# Patient Record
Sex: FEMALE | Race: WHITE | NOT HISPANIC OR LATINO | Employment: OTHER | URBAN - METROPOLITAN AREA
[De-identification: names, ages, dates, MRNs, and addresses within clinical notes are randomized per-mention and may not be internally consistent; named-entity substitution may affect disease eponyms.]

---

## 2017-01-10 ENCOUNTER — HISTORICAL (OUTPATIENT)
Dept: RADIOLOGY | Facility: HOSPITAL | Age: 82
End: 2017-01-10

## 2017-05-12 ENCOUNTER — HISTORICAL (OUTPATIENT)
Dept: RADIOLOGY | Facility: HOSPITAL | Age: 82
End: 2017-05-12

## 2017-07-10 ENCOUNTER — HISTORICAL (OUTPATIENT)
Dept: LAB | Facility: HOSPITAL | Age: 82
End: 2017-07-10

## 2017-07-10 LAB
ALBUMIN SERPL-MCNC: 3.9 GM/DL (ref 3.4–5)
ALBUMIN/GLOB SERPL: 1.3 RATIO (ref 1.1–2)
ALP SERPL-CCNC: 83 UNIT/L (ref 46–116)
ALT SERPL-CCNC: 37 UNIT/L (ref 12–78)
AST SERPL-CCNC: 27 UNIT/L (ref 15–37)
BILIRUB SERPL-MCNC: 0.9 MG/DL (ref 0.2–1)
BILIRUBIN DIRECT+TOT PNL SERPL-MCNC: 0.15 MG/DL (ref 0–0.2)
BILIRUBIN DIRECT+TOT PNL SERPL-MCNC: 0.75 MG/DL (ref 0–0.8)
BUN SERPL-MCNC: 13 MG/DL (ref 7–18)
CALCIUM SERPL-MCNC: 9 MG/DL (ref 8.5–10.1)
CHLORIDE SERPL-SCNC: 102 MMOL/L (ref 98–107)
CK MB SERPL-MCNC: 1.4 NG/ML
CK SERPL-CCNC: 93 UNIT/L (ref 21–232)
CO2 SERPL-SCNC: 31.3 MMOL/L (ref 21–32)
CREAT SERPL-MCNC: 1.13 MG/DL (ref 0.6–1.3)
GLOBULIN SER-MCNC: 3.1 GM/DL (ref 2.4–3.5)
GLUCOSE SERPL-MCNC: 131 MG/DL (ref 74–106)
POTASSIUM SERPL-SCNC: 3.7 MMOL/L (ref 3.5–5.1)
PROT SERPL-MCNC: 7 GM/DL (ref 6.4–8.2)
SODIUM SERPL-SCNC: 141 MMOL/L (ref 136–145)
TROPONIN I SERPL-MCNC: <0.02 NG/ML (ref 0.02–0.06)

## 2017-09-07 ENCOUNTER — HISTORICAL (OUTPATIENT)
Dept: LAB | Facility: HOSPITAL | Age: 82
End: 2017-09-07

## 2017-09-07 LAB
APTT PPP: 22.4 SECOND(S) (ref 24.5–34.9)
CHOLEST SERPL-MCNC: 285 MG/DL (ref 0–200)
CHOLEST/HDLC SERPL: 6.2 {RATIO} (ref 0–4)
ERYTHROCYTE [DISTWIDTH] IN BLOOD BY AUTOMATED COUNT: 13.6 % (ref 11.5–17)
HCT VFR BLD AUTO: 46.9 % (ref 37–47)
HDLC SERPL-MCNC: 46 MG/DL (ref 40–60)
HGB BLD-MCNC: 15.8 GM/DL (ref 12–16)
INR PPP: 0.99 (ref 2–3)
LDLC SERPL CALC-MCNC: 158 MG/DL (ref 0–129)
MCH RBC QN AUTO: 30.4 PG (ref 27–31)
MCHC RBC AUTO-ENTMCNC: 33.7 GM/DL (ref 33–36)
MCV RBC AUTO: 90.1 FL (ref 80–94)
PLATELET # BLD AUTO: 192 X10(3)/MCL (ref 130–400)
PMV BLD AUTO: 8.3 FL (ref 7.4–10.4)
PROTHROMBIN TIME: 10.1 SECOND(S) (ref 9.3–11.4)
RBC # BLD AUTO: 5.21 X10(6)/MCL (ref 4.2–5.4)
TRIGL SERPL-MCNC: 404 MG/DL
TSH SERPL-ACNC: 0.98 MIU/ML (ref 0.36–3.74)
VLDLC SERPL CALC-MCNC: 81 MG/DL
WBC # SPEC AUTO: 8.5 X10(3)/MCL (ref 4.5–11.5)

## 2018-02-19 ENCOUNTER — HISTORICAL (OUTPATIENT)
Dept: RADIOLOGY | Facility: HOSPITAL | Age: 83
End: 2018-02-19

## 2018-08-30 ENCOUNTER — HISTORICAL (OUTPATIENT)
Dept: LAB | Facility: HOSPITAL | Age: 83
End: 2018-08-30

## 2018-08-30 LAB
ALBUMIN SERPL-MCNC: 3.8 GM/DL (ref 3.4–5)
ALBUMIN/GLOB SERPL: 1.2 RATIO (ref 1.1–2)
ALP SERPL-CCNC: 84 UNIT/L (ref 46–116)
ALT SERPL-CCNC: 35 UNIT/L (ref 12–78)
APTT PPP: 22.5 SECOND(S) (ref 24.5–34.9)
AST SERPL-CCNC: 23 UNIT/L (ref 15–37)
BILIRUB SERPL-MCNC: 0.9 MG/DL (ref 0.2–1)
BILIRUBIN DIRECT+TOT PNL SERPL-MCNC: 0.13 MG/DL (ref 0–0.2)
BILIRUBIN DIRECT+TOT PNL SERPL-MCNC: 0.77 MG/DL (ref 0–0.8)
BUN SERPL-MCNC: 15.1 MG/DL (ref 7–18)
CALCIUM SERPL-MCNC: 9.3 MG/DL (ref 8.5–10.1)
CHLORIDE SERPL-SCNC: 100 MMOL/L (ref 98–107)
CO2 SERPL-SCNC: 32.1 MMOL/L (ref 21–32)
CREAT SERPL-MCNC: 0.99 MG/DL (ref 0.6–1.3)
ERYTHROCYTE [DISTWIDTH] IN BLOOD BY AUTOMATED COUNT: 13 % (ref 11.5–17)
GLOBULIN SER-MCNC: 3.1 GM/DL (ref 2.4–3.5)
GLUCOSE SERPL-MCNC: 111 MG/DL (ref 74–106)
HCT VFR BLD AUTO: 46.6 % (ref 37–47)
HGB BLD-MCNC: 15.5 GM/DL (ref 12–16)
INR PPP: 0.89 (ref 2–3)
MCH RBC QN AUTO: 30 PG (ref 27–31)
MCHC RBC AUTO-ENTMCNC: 33.3 GM/DL (ref 33–36)
MCV RBC AUTO: 90.3 FL (ref 80–94)
PLATELET # BLD AUTO: 230 X10(3)/MCL (ref 130–400)
PMV BLD AUTO: 10.2 FL (ref 9.4–12.4)
POTASSIUM SERPL-SCNC: 3.7 MMOL/L (ref 3.5–5.1)
PROT SERPL-MCNC: 6.9 GM/DL (ref 6.4–8.2)
PROTHROMBIN TIME: 9.5 SECOND(S) (ref 9.3–11.4)
RBC # BLD AUTO: 5.16 X10(6)/MCL (ref 4.2–5.4)
SODIUM SERPL-SCNC: 140 MMOL/L (ref 136–145)
WBC # SPEC AUTO: 9.4 X10(3)/MCL (ref 4.5–11.5)

## 2018-12-18 ENCOUNTER — HISTORICAL (OUTPATIENT)
Dept: LAB | Facility: HOSPITAL | Age: 83
End: 2018-12-18

## 2018-12-18 LAB
ALBUMIN SERPL-MCNC: 3.7 GM/DL (ref 3.4–5)
ALBUMIN/GLOB SERPL: 1.2 RATIO (ref 1.1–2)
ALP SERPL-CCNC: 76 UNIT/L (ref 46–116)
ALT SERPL-CCNC: 29 UNIT/L (ref 12–78)
AST SERPL-CCNC: 18 UNIT/L (ref 15–37)
BILIRUB SERPL-MCNC: 1 MG/DL (ref 0.2–1)
BILIRUBIN DIRECT+TOT PNL SERPL-MCNC: 0.17 MG/DL (ref 0–0.2)
BILIRUBIN DIRECT+TOT PNL SERPL-MCNC: 0.83 MG/DL (ref 0–0.8)
BUN SERPL-MCNC: 11.9 MG/DL (ref 7–18)
CALCIUM SERPL-MCNC: 9.4 MG/DL (ref 8.5–10.1)
CHLORIDE SERPL-SCNC: 102 MMOL/L (ref 98–107)
CHOLEST SERPL-MCNC: 186 MG/DL (ref 0–200)
CHOLEST/HDLC SERPL: 4.8 {RATIO} (ref 0–4)
CO2 SERPL-SCNC: 30.8 MMOL/L (ref 21–32)
CREAT SERPL-MCNC: 0.69 MG/DL (ref 0.6–1.3)
GLOBULIN SER-MCNC: 3.1 GM/DL (ref 2.4–3.5)
GLUCOSE SERPL-MCNC: 128 MG/DL (ref 74–106)
HDLC SERPL-MCNC: 39 MG/DL (ref 40–60)
LDLC SERPL CALC-MCNC: 105 MG/DL (ref 0–129)
POTASSIUM SERPL-SCNC: 4.2 MMOL/L (ref 3.5–5.1)
PROT SERPL-MCNC: 6.8 GM/DL (ref 6.4–8.2)
SODIUM SERPL-SCNC: 141 MMOL/L (ref 136–145)
TRIGL SERPL-MCNC: 209 MG/DL
VLDLC SERPL CALC-MCNC: 42 MG/DL

## 2019-05-22 ENCOUNTER — HISTORICAL (OUTPATIENT)
Dept: LAB | Facility: HOSPITAL | Age: 84
End: 2019-05-22

## 2019-05-22 LAB
ALBUMIN SERPL-MCNC: 3.8 GM/DL (ref 3.4–5)
ALBUMIN/GLOB SERPL: 1.2 RATIO (ref 1.1–2)
ALP SERPL-CCNC: 84 UNIT/L (ref 46–116)
ALT SERPL-CCNC: 29 UNIT/L (ref 12–78)
AST SERPL-CCNC: 21 UNIT/L (ref 15–37)
BILIRUB SERPL-MCNC: 1.2 MG/DL (ref 0.2–1)
BILIRUBIN DIRECT+TOT PNL SERPL-MCNC: 0.2 MG/DL (ref 0–0.2)
BILIRUBIN DIRECT+TOT PNL SERPL-MCNC: 1 MG/DL (ref 0–0.8)
BUN SERPL-MCNC: 10.9 MG/DL (ref 7–18)
CALCIUM SERPL-MCNC: 9.3 MG/DL (ref 8.5–10.1)
CHLORIDE SERPL-SCNC: 100 MMOL/L (ref 98–107)
CHOLEST SERPL-MCNC: 253 MG/DL (ref 0–200)
CHOLEST/HDLC SERPL: 6.5 {RATIO} (ref 0–4)
CO2 SERPL-SCNC: 29.7 MMOL/L (ref 21–32)
CREAT SERPL-MCNC: 0.81 MG/DL (ref 0.6–1.3)
ERYTHROCYTE [DISTWIDTH] IN BLOOD BY AUTOMATED COUNT: 13.2 % (ref 11.5–17)
GLOBULIN SER-MCNC: 3.2 GM/DL (ref 2.4–3.5)
GLUCOSE SERPL-MCNC: 120 MG/DL (ref 74–106)
HCT VFR BLD AUTO: 46 % (ref 37–47)
HDLC SERPL-MCNC: 39 MG/DL (ref 40–60)
HGB BLD-MCNC: 15.8 GM/DL (ref 12–16)
LDLC SERPL CALC-MCNC: 154 MG/DL (ref 0–129)
MCH RBC QN AUTO: 30.4 PG (ref 27–31)
MCHC RBC AUTO-ENTMCNC: 34.3 GM/DL (ref 33–36)
MCV RBC AUTO: 88.5 FL (ref 80–94)
PLATELET # BLD AUTO: 226 X10(3)/MCL (ref 130–400)
PMV BLD AUTO: 10.4 FL (ref 9.4–12.4)
POTASSIUM SERPL-SCNC: 4.1 MMOL/L (ref 3.5–5.1)
PROT SERPL-MCNC: 7 GM/DL (ref 6.4–8.2)
RBC # BLD AUTO: 5.2 X10(6)/MCL (ref 4.2–5.4)
SODIUM SERPL-SCNC: 139 MMOL/L (ref 136–145)
TRIGL SERPL-MCNC: 300 MG/DL
TSH SERPL-ACNC: 1.68 MIU/ML (ref 0.36–3.74)
VLDLC SERPL CALC-MCNC: 60 MG/DL
WBC # SPEC AUTO: 10.5 X10(3)/MCL (ref 4.5–11.5)

## 2019-05-28 ENCOUNTER — HISTORICAL (OUTPATIENT)
Dept: RADIOLOGY | Facility: HOSPITAL | Age: 84
End: 2019-05-28

## 2020-01-23 ENCOUNTER — HISTORICAL (OUTPATIENT)
Dept: RADIOLOGY | Facility: HOSPITAL | Age: 85
End: 2020-01-23

## 2020-01-31 ENCOUNTER — HISTORICAL (OUTPATIENT)
Dept: LAB | Facility: HOSPITAL | Age: 85
End: 2020-01-31

## 2020-01-31 LAB
ALBUMIN SERPL-MCNC: 3.6 GM/DL (ref 3.4–5)
ALBUMIN/GLOB SERPL: 1.2 RATIO (ref 1.1–2)
ALP SERPL-CCNC: 92 UNIT/L (ref 46–116)
ALT SERPL-CCNC: 26 UNIT/L (ref 12–78)
AST SERPL-CCNC: 21 UNIT/L (ref 15–37)
BILIRUB SERPL-MCNC: 1 MG/DL (ref 0.2–1)
BILIRUBIN DIRECT+TOT PNL SERPL-MCNC: 0.18 MG/DL (ref 0–0.2)
BILIRUBIN DIRECT+TOT PNL SERPL-MCNC: 0.82 MG/DL (ref 0–0.8)
BUN SERPL-MCNC: 13 MG/DL (ref 7–18)
CALCIUM SERPL-MCNC: 9.2 MG/DL (ref 8.5–10.1)
CHLORIDE SERPL-SCNC: 97 MMOL/L (ref 98–107)
CHOLEST SERPL-MCNC: 219 MG/DL (ref 0–200)
CHOLEST/HDLC SERPL: 5.2 {RATIO} (ref 0–4)
CO2 SERPL-SCNC: 32 MMOL/L (ref 21–32)
CREAT SERPL-MCNC: 0.85 MG/DL (ref 0.6–1.3)
GLOBULIN SER-MCNC: 3 GM/DL (ref 2.4–3.5)
GLUCOSE SERPL-MCNC: 122 MG/DL (ref 74–106)
HDLC SERPL-MCNC: 42 MG/DL (ref 40–60)
LDLC SERPL CALC-MCNC: 131 MG/DL (ref 0–129)
POTASSIUM SERPL-SCNC: 5 MMOL/L (ref 3.5–5.1)
PROT SERPL-MCNC: 6.6 GM/DL (ref 6.4–8.2)
SODIUM SERPL-SCNC: 136 MMOL/L (ref 136–145)
TRIGL SERPL-MCNC: 228 MG/DL
VLDLC SERPL CALC-MCNC: 46 MG/DL

## 2021-11-18 ENCOUNTER — HISTORICAL (OUTPATIENT)
Dept: RADIOLOGY | Facility: HOSPITAL | Age: 86
End: 2021-11-18

## 2022-06-20 DIAGNOSIS — R22.1 MASS OF NECK: Primary | ICD-10-CM

## 2022-06-28 ENCOUNTER — HOSPITAL ENCOUNTER (OUTPATIENT)
Dept: RADIOLOGY | Facility: HOSPITAL | Age: 87
Discharge: HOME | End: 2022-06-28
Attending: FAMILY MEDICINE
Payer: MEDICARE

## 2022-06-28 DIAGNOSIS — R22.1 MASS OF NECK: ICD-10-CM

## 2022-06-28 PROCEDURE — 76536 US EXAM OF HEAD AND NECK: CPT | Mod: TC

## 2022-10-17 ENCOUNTER — LAB VISIT (OUTPATIENT)
Dept: LAB | Facility: HOSPITAL | Age: 87
End: 2022-10-17
Attending: FAMILY MEDICINE
Payer: MEDICARE

## 2022-10-17 DIAGNOSIS — M12.9 ACUTE ARTHROPATHY: ICD-10-CM

## 2022-10-17 DIAGNOSIS — E78.5 HYPERLIPIDEMIA, UNSPECIFIED HYPERLIPIDEMIA TYPE: ICD-10-CM

## 2022-10-17 DIAGNOSIS — F45.8 ANXIETY HYPERVENTILATION: Primary | ICD-10-CM

## 2022-10-17 DIAGNOSIS — E03.9 MYXEDEMA HEART DISEASE: ICD-10-CM

## 2022-10-17 DIAGNOSIS — M25.50 PAIN IN JOINT, MULTIPLE SITES: ICD-10-CM

## 2022-10-17 DIAGNOSIS — I10 HYPERTENSION, UNSPECIFIED TYPE: ICD-10-CM

## 2022-10-17 DIAGNOSIS — F41.9 ANXIETY HYPERVENTILATION: Primary | ICD-10-CM

## 2022-10-17 DIAGNOSIS — I51.9 MYXEDEMA HEART DISEASE: ICD-10-CM

## 2022-10-17 LAB
ALBUMIN SERPL-MCNC: 3.9 GM/DL (ref 3.4–4.8)
ALBUMIN/GLOB SERPL: 1.2 RATIO (ref 1.1–2)
ALP SERPL-CCNC: 84 UNIT/L (ref 40–150)
ALT SERPL-CCNC: 22 UNIT/L (ref 0–55)
AST SERPL-CCNC: 19 UNIT/L (ref 5–34)
BILIRUBIN DIRECT+TOT PNL SERPL-MCNC: 1.2 MG/DL
BUN SERPL-MCNC: 15.4 MG/DL (ref 9.8–20.1)
CALCIUM SERPL-MCNC: 9.7 MG/DL (ref 8.4–10.2)
CHLORIDE SERPL-SCNC: 98 MMOL/L (ref 98–107)
CHOLEST SERPL-MCNC: 233 MG/DL
CHOLEST/HDLC SERPL: 6 {RATIO} (ref 0–5)
CO2 SERPL-SCNC: 27 MMOL/L (ref 23–31)
CREAT SERPL-MCNC: 0.88 MG/DL (ref 0.55–1.02)
ERYTHROCYTE [DISTWIDTH] IN BLOOD BY AUTOMATED COUNT: 13.4 % (ref 11.5–17)
GFR SERPLBLD CREATININE-BSD FMLA CKD-EPI: >60 MLS/MIN/1.73/M2
GLOBULIN SER-MCNC: 3.2 GM/DL (ref 2.4–3.5)
GLUCOSE SERPL-MCNC: 157 MG/DL (ref 82–115)
HCT VFR BLD AUTO: 48.8 % (ref 37–47)
HDLC SERPL-MCNC: 42 MG/DL (ref 35–60)
HGB BLD-MCNC: 15.3 GM/DL (ref 12–16)
LDLC SERPL CALC-MCNC: 137 MG/DL (ref 50–140)
MCH RBC QN AUTO: 27.4 PG (ref 27–31)
MCHC RBC AUTO-ENTMCNC: 31.4 MG/DL (ref 33–36)
MCV RBC AUTO: 87.5 FL (ref 80–94)
PLATELET # BLD AUTO: 236 X10(3)/MCL (ref 130–400)
PMV BLD AUTO: 10.5 FL (ref 7.4–10.4)
POTASSIUM SERPL-SCNC: 3.8 MMOL/L (ref 3.5–5.1)
PROT SERPL-MCNC: 7.1 GM/DL (ref 5.8–7.6)
RBC # BLD AUTO: 5.58 X10(6)/MCL (ref 4.2–5.4)
SODIUM SERPL-SCNC: 139 MMOL/L (ref 136–145)
TRIGL SERPL-MCNC: 269 MG/DL (ref 37–140)
TSH SERPL-ACNC: 1.64 UIU/ML (ref 0.35–4.94)
VLDLC SERPL CALC-MCNC: 54 MG/DL
WBC # SPEC AUTO: 12 X10(3)/MCL (ref 4.5–11.5)

## 2022-10-17 PROCEDURE — 80061 LIPID PANEL: CPT

## 2022-10-17 PROCEDURE — 36415 COLL VENOUS BLD VENIPUNCTURE: CPT

## 2022-10-17 PROCEDURE — 84443 ASSAY THYROID STIM HORMONE: CPT

## 2022-10-17 PROCEDURE — 85027 COMPLETE CBC AUTOMATED: CPT

## 2022-10-17 PROCEDURE — 80053 COMPREHEN METABOLIC PANEL: CPT

## 2022-10-19 ENCOUNTER — APPOINTMENT (OUTPATIENT)
Dept: LAB | Facility: HOSPITAL | Age: 87
End: 2022-10-19
Attending: FAMILY MEDICINE
Payer: MEDICARE

## 2022-10-19 LAB
APPEARANCE UR: CLEAR
BACTERIA #/AREA URNS AUTO: NORMAL /HPF
BILIRUB UR QL STRIP.AUTO: ABNORMAL MG/DL
COLOR UR AUTO: YELLOW
GLUCOSE UR QL STRIP.AUTO: NEGATIVE MG/DL
KETONES UR QL STRIP.AUTO: 15 MG/DL
LEUKOCYTE ESTERASE UR QL STRIP.AUTO: NEGATIVE UNIT/L
NITRITE UR QL STRIP.AUTO: NEGATIVE
PH UR STRIP.AUTO: 5.5 [PH]
PROT UR QL STRIP.AUTO: 100 MG/DL
RBC #/AREA URNS AUTO: NORMAL /HPF
RBC UR QL AUTO: ABNORMAL UNIT/L
SP GR UR STRIP.AUTO: >=1.03
SQUAMOUS #/AREA URNS AUTO: NORMAL /HPF
UROBILINOGEN UR STRIP-ACNC: 1 MG/DL
WBC #/AREA URNS AUTO: NORMAL /HPF

## 2022-10-19 PROCEDURE — 81001 URINALYSIS AUTO W/SCOPE: CPT

## 2022-11-21 ENCOUNTER — HOSPITAL ENCOUNTER (EMERGENCY)
Facility: HOSPITAL | Age: 87
Discharge: HOME | End: 2022-11-22
Attending: EMERGENCY MEDICINE
Payer: MEDICARE

## 2022-11-21 DIAGNOSIS — R55 NEAR SYNCOPE: ICD-10-CM

## 2022-11-21 DIAGNOSIS — G93.89 MASS OF TEMPOROPARIETAL REGION OF BRAIN: Primary | ICD-10-CM

## 2022-11-21 PROBLEM — G25.81 RESTLESS LEGS SYNDROME: Status: ACTIVE | Noted: 2022-11-21

## 2022-11-21 PROBLEM — G43.909 MIGRAINE HEADACHE: Status: ACTIVE | Noted: 2022-11-21

## 2022-11-21 PROBLEM — F41.9 ANXIETY: Status: ACTIVE | Noted: 2022-11-21

## 2022-11-21 PROBLEM — K46.9 ABDOMINAL HERNIA: Status: ACTIVE | Noted: 2022-11-21

## 2022-11-21 PROBLEM — J18.9 PNEUMONIA: Status: ACTIVE | Noted: 2022-11-21

## 2022-11-21 PROBLEM — K57.92 DIVERTICULITIS: Status: ACTIVE | Noted: 2022-11-21

## 2022-11-21 PROBLEM — I51.9 HEART DISEASE: Status: ACTIVE | Noted: 2022-11-21

## 2022-11-21 PROBLEM — M54.9 CHRONIC BACK PAIN: Status: ACTIVE | Noted: 2022-11-21

## 2022-11-21 PROBLEM — H26.9 CATARACT OF BOTH EYES: Status: ACTIVE | Noted: 2022-11-21

## 2022-11-21 PROBLEM — F40.240 CLAUSTROPHOBIA: Status: ACTIVE | Noted: 2022-11-21

## 2022-11-21 PROBLEM — G89.29 CHRONIC BACK PAIN: Status: ACTIVE | Noted: 2022-11-21

## 2022-11-21 PROBLEM — M19.90 ARTHRITIS: Status: ACTIVE | Noted: 2022-11-21

## 2022-11-21 PROBLEM — I20.0 UNSTABLE ANGINA: Status: ACTIVE | Noted: 2020-02-27

## 2022-11-21 PROBLEM — J30.2 SEASONAL ALLERGIC RHINITIS: Status: ACTIVE | Noted: 2022-11-21

## 2022-11-21 PROBLEM — R68.89 IMPAIRED EXERCISE TOLERANCE: Status: ACTIVE | Noted: 2022-11-21

## 2022-11-21 PROBLEM — E07.9 DISORDER OF THYROID: Status: ACTIVE | Noted: 2022-11-21

## 2022-11-21 PROBLEM — R53.1 WEAKNESS: Status: ACTIVE | Noted: 2022-11-21

## 2022-11-21 PROBLEM — I49.9 IRREGULAR HEART RHYTHM: Status: ACTIVE | Noted: 2022-11-21

## 2022-11-21 PROBLEM — Z97.3 WEARS EYEGLASSES: Status: ACTIVE | Noted: 2022-11-21

## 2022-11-21 PROBLEM — E78.00 HYPERCHOLESTEROLEMIA: Status: ACTIVE | Noted: 2022-11-21

## 2022-11-21 PROBLEM — K21.9 GASTROESOPHAGEAL REFLUX DISEASE: Status: ACTIVE | Noted: 2022-11-21

## 2022-11-21 LAB
ALBUMIN SERPL-MCNC: 4.1 GM/DL (ref 3.4–4.8)
ALBUMIN/GLOB SERPL: 1.4 RATIO (ref 1.1–2)
ALP SERPL-CCNC: 82 UNIT/L (ref 40–150)
ALT SERPL-CCNC: 16 UNIT/L (ref 0–55)
APPEARANCE UR: CLEAR
AST SERPL-CCNC: 19 UNIT/L (ref 5–34)
BACTERIA #/AREA URNS AUTO: ABNORMAL /HPF
BASOPHILS # BLD AUTO: 0.06 X10(3)/MCL (ref 0–0.2)
BASOPHILS NFR BLD AUTO: 0.4 %
BILIRUB UR QL STRIP.AUTO: NEGATIVE MG/DL
BILIRUBIN DIRECT+TOT PNL SERPL-MCNC: 1.5 MG/DL
BUN SERPL-MCNC: 19.3 MG/DL (ref 9.8–20.1)
CALCIUM SERPL-MCNC: 9.8 MG/DL (ref 8.4–10.2)
CHLORIDE SERPL-SCNC: 97 MMOL/L (ref 98–107)
CO2 SERPL-SCNC: 27 MMOL/L (ref 23–31)
COLOR UR AUTO: YELLOW
CREAT SERPL-MCNC: 1 MG/DL (ref 0.55–1.02)
EOSINOPHIL # BLD AUTO: 0.17 X10(3)/MCL (ref 0–0.9)
EOSINOPHIL NFR BLD AUTO: 1.3 %
ERYTHROCYTE [DISTWIDTH] IN BLOOD BY AUTOMATED COUNT: 13.4 % (ref 11.5–17)
FLUAV AG UPPER RESP QL IA.RAPID: NOT DETECTED
FLUBV AG UPPER RESP QL IA.RAPID: NOT DETECTED
GFR SERPLBLD CREATININE-BSD FMLA CKD-EPI: 55 MLS/MIN/1.73/M2
GLOBULIN SER-MCNC: 3 GM/DL (ref 2.4–3.5)
GLUCOSE SERPL-MCNC: 187 MG/DL (ref 82–115)
GLUCOSE UR QL STRIP.AUTO: NEGATIVE MG/DL
HCT VFR BLD AUTO: 48.1 % (ref 37–47)
HGB BLD-MCNC: 15.6 GM/DL (ref 12–16)
IMM GRANULOCYTES # BLD AUTO: 0.1 X10(3)/MCL (ref 0–0.04)
IMM GRANULOCYTES NFR BLD AUTO: 0.7 %
KETONES UR QL STRIP.AUTO: NEGATIVE MG/DL
LEUKOCYTE ESTERASE UR QL STRIP.AUTO: ABNORMAL UNIT/L
LYMPHOCYTES # BLD AUTO: 2.73 X10(3)/MCL (ref 0.6–4.6)
LYMPHOCYTES NFR BLD AUTO: 20.3 %
MCH RBC QN AUTO: 27.9 PG (ref 27–31)
MCHC RBC AUTO-ENTMCNC: 32.4 MG/DL (ref 33–36)
MCV RBC AUTO: 86 FL (ref 80–94)
MONOCYTES # BLD AUTO: 1.12 X10(3)/MCL (ref 0.1–1.3)
MONOCYTES NFR BLD AUTO: 8.3 %
MUCOUS THREADS URNS QL MICRO: ABNORMAL /LPF
NEUTROPHILS # BLD AUTO: 9.3 X10(3)/MCL (ref 2.1–9.2)
NEUTROPHILS NFR BLD AUTO: 69 %
NITRITE UR QL STRIP.AUTO: NEGATIVE
PH UR STRIP.AUTO: 6 [PH]
PLATELET # BLD AUTO: 295 X10(3)/MCL (ref 130–400)
PMV BLD AUTO: 10.8 FL (ref 7.4–10.4)
POC CARDIAC TROPONIN I: 0 NG/ML (ref 0–0.08)
POCT GLUCOSE: 163 MG/DL (ref 70–110)
POTASSIUM SERPL-SCNC: 3.6 MMOL/L (ref 3.5–5.1)
PROT SERPL-MCNC: 7.1 GM/DL (ref 5.8–7.6)
PROT UR QL STRIP.AUTO: ABNORMAL MG/DL
RBC # BLD AUTO: 5.59 X10(6)/MCL (ref 4.2–5.4)
RBC #/AREA URNS AUTO: ABNORMAL /HPF
RBC UR QL AUTO: ABNORMAL UNIT/L
SAMPLE: NORMAL
SARS-COV-2 RNA RESP QL NAA+PROBE: NOT DETECTED
SODIUM SERPL-SCNC: 137 MMOL/L (ref 136–145)
SP GR UR STRIP.AUTO: >=1.03
SQUAMOUS #/AREA URNS AUTO: ABNORMAL /HPF
TSH SERPL-ACNC: 2.48 UIU/ML (ref 0.35–4.94)
UROBILINOGEN UR STRIP-ACNC: 0.2 MG/DL
WBC # SPEC AUTO: 13.4 X10(3)/MCL (ref 4.5–11.5)
WBC #/AREA URNS AUTO: ABNORMAL /HPF

## 2022-11-21 PROCEDURE — 82962 GLUCOSE BLOOD TEST: CPT

## 2022-11-21 PROCEDURE — 96365 THER/PROPH/DIAG IV INF INIT: CPT | Mod: 59

## 2022-11-21 PROCEDURE — 25000003 PHARM REV CODE 250: Performed by: SPECIALIST

## 2022-11-21 PROCEDURE — 84443 ASSAY THYROID STIM HORMONE: CPT | Performed by: EMERGENCY MEDICINE

## 2022-11-21 PROCEDURE — 81001 URINALYSIS AUTO W/SCOPE: CPT | Performed by: EMERGENCY MEDICINE

## 2022-11-21 PROCEDURE — 99285 EMERGENCY DEPT VISIT HI MDM: CPT | Mod: 25

## 2022-11-21 PROCEDURE — 63600175 PHARM REV CODE 636 W HCPCS: Performed by: EMERGENCY MEDICINE

## 2022-11-21 PROCEDURE — 80053 COMPREHEN METABOLIC PANEL: CPT | Performed by: EMERGENCY MEDICINE

## 2022-11-21 PROCEDURE — 81003 URINALYSIS AUTO W/O SCOPE: CPT | Performed by: EMERGENCY MEDICINE

## 2022-11-21 PROCEDURE — 0240U COVID/FLU A&B PCR: CPT | Performed by: EMERGENCY MEDICINE

## 2022-11-21 PROCEDURE — 84484 ASSAY OF TROPONIN QUANT: CPT

## 2022-11-21 PROCEDURE — 85025 COMPLETE CBC W/AUTO DIFF WBC: CPT | Performed by: EMERGENCY MEDICINE

## 2022-11-21 PROCEDURE — 93010 EKG 12-LEAD: ICD-10-PCS | Mod: ,,, | Performed by: STUDENT IN AN ORGANIZED HEALTH CARE EDUCATION/TRAINING PROGRAM

## 2022-11-21 PROCEDURE — 25500020 PHARM REV CODE 255: Performed by: EMERGENCY MEDICINE

## 2022-11-21 PROCEDURE — 93010 ELECTROCARDIOGRAM REPORT: CPT | Mod: ,,, | Performed by: STUDENT IN AN ORGANIZED HEALTH CARE EDUCATION/TRAINING PROGRAM

## 2022-11-21 PROCEDURE — 93005 ELECTROCARDIOGRAM TRACING: CPT

## 2022-11-21 PROCEDURE — 96375 TX/PRO/DX INJ NEW DRUG ADDON: CPT

## 2022-11-21 RX ORDER — LEVOTHYROXINE SODIUM 75 UG/1
1 TABLET ORAL DAILY
COMMUNITY
Start: 2022-10-17 | End: 2023-03-07 | Stop reason: SDUPTHER

## 2022-11-21 RX ORDER — HYDROCHLOROTHIAZIDE 25 MG/1
0.5 TABLET ORAL DAILY
Status: ON HOLD | COMMUNITY
Start: 2022-10-17 | End: 2022-11-30 | Stop reason: HOSPADM

## 2022-11-21 RX ORDER — CELECOXIB 200 MG/1
1 CAPSULE ORAL DAILY
Status: ON HOLD | COMMUNITY
Start: 2022-10-17 | End: 2022-11-30 | Stop reason: HOSPADM

## 2022-11-21 RX ORDER — CETIRIZINE HYDROCHLORIDE 10 MG/1
10 TABLET ORAL
Status: COMPLETED | OUTPATIENT
Start: 2022-11-21 | End: 2022-11-21

## 2022-11-21 RX ORDER — MINERAL OIL
1 ENEMA (ML) RECTAL DAILY
COMMUNITY
Start: 2022-10-17

## 2022-11-21 RX ORDER — FLUTICASONE PROPIONATE 50 MCG
1 SPRAY, SUSPENSION (ML) NASAL DAILY
COMMUNITY
Start: 2022-10-17

## 2022-11-21 RX ORDER — ATENOLOL 50 MG/1
1 TABLET ORAL DAILY
Status: ON HOLD | COMMUNITY
Start: 2022-10-17 | End: 2022-11-30 | Stop reason: HOSPADM

## 2022-11-21 RX ORDER — PANTOPRAZOLE SODIUM 40 MG/1
1 TABLET, DELAYED RELEASE ORAL DAILY
Status: ON HOLD | COMMUNITY
Start: 2022-10-17 | End: 2022-11-30 | Stop reason: SDUPTHER

## 2022-11-21 RX ORDER — LEVETIRACETAM 500 MG/5ML
500 INJECTION, SOLUTION, CONCENTRATE INTRAVENOUS
Status: DISCONTINUED | OUTPATIENT
Start: 2022-11-21 | End: 2022-11-21

## 2022-11-21 RX ORDER — DEXAMETHASONE SODIUM PHOSPHATE 4 MG/ML
8 INJECTION, SOLUTION INTRA-ARTICULAR; INTRALESIONAL; INTRAMUSCULAR; INTRAVENOUS; SOFT TISSUE
Status: COMPLETED | OUTPATIENT
Start: 2022-11-21 | End: 2022-11-21

## 2022-11-21 RX ORDER — LEVETIRACETAM 5 MG/ML
500 INJECTION INTRAVASCULAR ONCE
Status: COMPLETED | OUTPATIENT
Start: 2022-11-21 | End: 2022-11-21

## 2022-11-21 RX ORDER — ALPRAZOLAM 0.5 MG/1
0.5 TABLET ORAL DAILY PRN
Status: ON HOLD | COMMUNITY
Start: 2022-10-22 | End: 2022-11-30 | Stop reason: SDUPTHER

## 2022-11-21 RX ORDER — CITALOPRAM 20 MG/1
1 TABLET, FILM COATED ORAL DAILY
COMMUNITY
Start: 2022-08-06 | End: 2023-03-07 | Stop reason: SDUPTHER

## 2022-11-21 RX ADMIN — IOPAMIDOL 100 ML: 755 INJECTION, SOLUTION INTRAVENOUS at 02:11

## 2022-11-21 RX ADMIN — LEVETIRACETAM 500 MG: 5 INJECTION INTRAVENOUS at 04:11

## 2022-11-21 RX ADMIN — CETIRIZINE HYDROCHLORIDE 10 MG: 10 TABLET, FILM COATED ORAL at 11:11

## 2022-11-21 RX ADMIN — DEXAMETHASONE SODIUM PHOSPHATE 8 MG: 4 INJECTION, SOLUTION INTRA-ARTICULAR; INTRALESIONAL; INTRAMUSCULAR; INTRAVENOUS; SOFT TISSUE at 03:11

## 2022-11-21 NOTE — ED NOTES
Spoke with Antonella SMITH transfer center [facility] turned over transfer to [facility] per family request wanted to be closer to [place].

## 2022-11-21 NOTE — ED NOTES
Pt and pt daughter, Tyesha, aware of diversion to [facility] and [facility]. Spoke to bre from transfer center and pt/pt daughter would like to see if pt could be placed in [place] due to family living close to [facility].

## 2022-11-21 NOTE — ED NOTES
Spoke with Tanava RN [facility] transfer team was awaiting on  To call whom is in procedure will have next nurse on case call with an update.

## 2022-11-21 NOTE — ED PROVIDER NOTES
Encounter Date: 11/21/2022       History     Chief Complaint   Patient presents with    Dizziness     Pt's daughter reports near syncopal episodes. She stated onset 1 month ago. Pt denies LOC      This 87-year-old female is brought in by her daughter with complaints that she has episodes when she thinks she is going to pass out.  She states these episodes of brief and come on quickly and that if she does not go to sit down or lay down she would surely pass out.  She has not actually passed out.  She states she is unable to describe these episodes.  Her daughter notes that when they occur she has difficulty finding words.  She states when it occurs she feels confused.  She has migraine headaches usually which began as visual scotoma.  These do not correlate with the episodes.       Review of patient's allergies indicates:   Allergen Reactions    Pregabalin Swelling    Atorvastatin Other (See Comments)     Muscle weakness      Dexlansoprazole Other (See Comments)     Other reaction(s): HAND SPASMS  Muscle weakness      Ezetimibe Other (See Comments)     Other reaction(s): MUSCLE PAIN  Muscle pain      Gabapentin Other (See Comments)     Other reaction(s): MAKES HER DIZZY  Dizziness      Onabotulinumtoxina      Medical botox    Oxybutynin Other (See Comments)     Other reaction(s): CANT PEE  Stopped urine flow      Rosuvastatin Other (See Comments)     Leg muscle pain      Sertraline Other (See Comments)     Shaking of hands      Meperidine Nausea Only     Past Medical History:   Diagnosis Date    Anxiety disorder, unspecified     Coronary artery disease     GERD (gastroesophageal reflux disease)     Thyroid disease      Past Surgical History:   Procedure Laterality Date    APPENDECTOMY      CHOLECYSTECTOMY       History reviewed. No pertinent family history.  Social History     Tobacco Use    Smoking status: Never    Smokeless tobacco: Never     Review of Systems   Constitutional:  Negative for activity change,  appetite change, chills, diaphoresis, fatigue and fever.   HENT:  Positive for congestion and sinus pressure. Negative for sore throat.    Respiratory:  Negative for cough, shortness of breath and wheezing.    Cardiovascular:  Negative for chest pain, palpitations and leg swelling.   Gastrointestinal:  Negative for abdominal pain, blood in stool, constipation, diarrhea, nausea and vomiting.   Endocrine: Negative for cold intolerance, heat intolerance, polydipsia, polyphagia and polyuria.   Genitourinary:  Negative for difficulty urinating, dysuria and hematuria.   Musculoskeletal:  Positive for back pain.   Skin:  Negative for color change and rash.   Neurological:  Positive for speech difficulty, weakness and headaches.   Hematological:  Does not bruise/bleed easily.   Psychiatric/Behavioral:  Positive for confusion.      Physical Exam     Initial Vitals [11/21/22 1249]   BP Pulse Resp Temp SpO2   (!) 181/87 62 17 98.2 °F (36.8 °C) 97 %      MAP       --         Physical Exam    Nursing note and vitals reviewed.  Constitutional: She appears well-developed and well-nourished.   HENT:   Head: Normocephalic and atraumatic.   Eyes: Conjunctivae and EOM are normal. Pupils are equal, round, and reactive to light.   Neck: Neck supple.   Normal range of motion.  Cardiovascular:  Normal rate, regular rhythm, normal heart sounds and intact distal pulses.           Pulmonary/Chest: Breath sounds normal.   Abdominal: Abdomen is soft. Bowel sounds are normal.   Musculoskeletal:         General: Normal range of motion.      Cervical back: Normal range of motion and neck supple.     Neurological: She is alert and oriented to person, place, and time. She has normal strength.   Skin: Skin is warm and dry. Capillary refill takes less than 2 seconds.   Psychiatric: She has a normal mood and affect. Her behavior is normal. Judgment and thought content normal.       ED Course   Critical Care    Date/Time: 11/21/2022 5:54 PM  Performed  by: López Banerjee MD  Authorized by: López Banerjee MD   Direct patient critical care time: 15 minutes  Additional history critical care time: 10 minutes  Ordering / reviewing critical care time: 10 minutes  Documentation critical care time: 10 minutes  Consult with family critical care time: 15 minutes  Total critical care time (exclusive of procedural time) : 60 minutes  Critical care was necessary to treat or prevent imminent or life-threatening deterioration of the following conditions: CNS failure or compromise.  Critical care was time spent personally by me on the following activities: examination of patient, obtaining history from patient or surrogate, ordering and performing treatments and interventions, ordering and review of laboratory studies, ordering and review of radiographic studies and re-evaluation of patient's condition.      Labs Reviewed   COMPREHENSIVE METABOLIC PANEL - Abnormal; Notable for the following components:       Result Value    Chloride 97 (*)     Glucose Level 187 (*)     All other components within normal limits   URINALYSIS, REFLEX TO URINE CULTURE - Abnormal; Notable for the following components:    Protein, UA Trace (*)     Blood, UA Moderate (*)     Leukocyte Esterase, UA Small (*)     All other components within normal limits   CBC WITH DIFFERENTIAL - Abnormal; Notable for the following components:    WBC 13.4 (*)     RBC 5.59 (*)     Hct 48.1 (*)     MCHC 32.4 (*)     MPV 10.8 (*)     Neut # 9.3 (*)     IG# 0.10 (*)     All other components within normal limits   URINALYSIS, MICROSCOPIC - Abnormal; Notable for the following components:    Bacteria, UA Few (*)     Mucous, UA Trace (*)     Squamous Epithelial Cells, UA Many (*)     All other components within normal limits   POCT GLUCOSE - Abnormal; Notable for the following components:    POCT Glucose 163 (*)     All other components within normal limits   COVID/FLU A&B PCR - Normal    Narrative:     The Xpert Xpress  SARS-CoV-2/FLU/RSV plus is a rapid, multiplexed real-time PCR test intended for the simultaneous qualitative detection and differentiation of SARS-CoV-2, Influenza A, Influenza B, and respiratory syncytial virus (RSV) viral RNA in either nasopharyngeal swab or nasal swab specimens.         TSH - Normal   TROPONIN ISTAT   CBC W/ AUTO DIFFERENTIAL    Narrative:     The following orders were created for panel order CBC auto differential.  Procedure                               Abnormality         Status                     ---------                               -----------         ------                     CBC with Differential[[id number]]        Abnormal            Final result                 Please view results for these tests on the individual orders.     EKG Readings: (Independently Interpreted)   Initial Reading: No STEMI. Rhythm: Sinus Bradycardia. Heart Rate: 59. Ectopy: No Ectopy. Conduction: Normal. ST Segments: Normal ST Segments. T Waves: Normal.   11/21/22 1239   ECG Results              EKG 12-lead (Final result)  Result time 11/22/22 06:22:26      Final result by Interface, Lab In [facility] (11/22/22 06:22:26)                   Narrative:    Test Reason : R55,    Vent. Rate : 059 BPM     Atrial Rate : 059 BPM     P-R Int : 164 ms          QRS Dur : 078 ms      QT Int : 390 ms       P-R-T Axes : 054 035 052 degrees     QTc Int : 386 ms    Sinus bradycardia  Otherwise normal ECG  No previous ECGs available  Confirmed by Max Washington MD ([id number]) on 11/22/2022 6:22:17 AM    Referred By: JOSE ANTONIO ROSE           Confirmed By:Max Washington MD                                  Imaging Results              CT Head With Contrast (Final result)  Result time 11/21/22 15:03:19   Procedure changed from CT Head W Wo Contrast     Final result by Gale Solano MD (11/21/22 15:03:19)                   Impression:      Enhancing left medial temporal lobe mass with surrounding edema, concerning for  malignancy.      Electronically signed by: Gale Solano  Date:    11/21/2022  Time:    15:03               Narrative:    EXAMINATION:  CT HEAD WITH CONTRAST    CLINICAL HISTORY:  Brain/CNS neoplasm, staging;    TECHNIQUE:  Axial scans were obtained from skull base to the vertex with contrast.    Coronal and sagittal reconstructions obtained from the axial data.    Automatic exposure control was utilized to limit radiation dose.    Radiation Dose:    Total DLP: 1105 mGy*cm    COMPARISON:  CT head without contrast from the same day    FINDINGS:  An enhancing mass in the left medial temporal lobe measures 1.5 x 1.9 cm in size, with surrounding edema (series 4, image 21).  There is no definite additional enhancing mass identified.    There is local mass effect without midline shift or herniation.  There is effacement of the left temporal horn.  There is no midline shift or downward herniation.  The basal cisterns are patent.  There is no abnormal extra-axial fluid collection.  The major vessels enhance normally.  The calvarium and skull base are intact.                                        CT Head Without Contrast (Final result)  Result time 11/21/22 14:03:11      Final result by Lucy Doshi MD (11/21/22 14:03:11)                   Impression:      Findings seen consistent with a mass in the temporal fossa on the left side with surrounding edema.  Contrast enhanced MRI or CT scan is recommended.    This report was flagged in Epic as abnormal.      Electronically signed by: Lucy Doshi  Date:    11/21/2022  Time:    14:03               Narrative:    EXAMINATION:  CT HEAD WITHOUT CONTRAST    CLINICAL HISTORY:  Mental status change, unknown cause;    TECHNIQUE:  Multiple axial images were obtained from the base of the brain to the vertex without contrast administration.  Sagittal and coronal reconstructions were performed. .Automatic exposure control  (AEC) is utilized to reduce patient radiation  exposure.    COMPARISON:  None available    FINDINGS:  There appears to be a mass in the temporal lobe on the left side.  The mass measures roughly 2 cm x 1.8 cm.  There is some surrounding edema seen.  Edema extends into the posterior parietal region.  No other mass or lesion is seen.  No hemorrhage is seen.  Ventricles and basal cisterns appear grossly unremarkable.  Posterior fossa appears grossly unremarkable.  Calvarium is intact.  Paranasal sinuses appear grossly unremarkable.                                       Medications   iopamidoL (ISOVUE-370) injection 100 mL (100 mLs Intravenous Given 11/21/22 1452)   dexAMETHasone injection 8 mg (8 mg Intravenous Given 11/21/22 1548)   levETIRAcetam in NaCl (iso-os) IVPB 500 mg (0 mg Intravenous Stopped 11/21/22 1647)   cetirizine tablet 10 mg (10 mg Oral Given 11/21/22 2318)   ALPRAZolam tablet 0.5 mg (0.5 mg Oral Given 11/22/22 0213)   levETIRAcetam tablet 500 mg (500 mg Oral Given 11/22/22 0213)                 ED Course as of 11/22/22 0909   Mon Nov 21, 2022   1331 I was called to the room because the patient was having another episode. She had her eyes closed and was clearly anxious. Her heart rate was regular blood pressure and blood sugar were normal, oxygen was normal. It passed about 2 minutes after it started. There was no seizure activity. She remained conscious the entire time. [GA]   1800 I, Dr. Cottrell, assumed care of this patient at 6:00 p.m. [DD]   Tue Nov 22, 2022   0608 I Dr Banerjee reassumed the care of this patient at 0600 AM 11/22/22. [GA]   0903 We are informed that though [facility] accepted yesterday they still have no beds and have 30 patients in their ER and will be unlikely to accept today. The patient has had a stable night with no further episodes or spells. The family intends to take her to [place] where they live. I think she can be discharged on decadron and keppra and she can present to an ER in [place] of her choice.  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 Provider    dexAMETHasone (DECADRON) 2 MG tablet Take 2 tablets (4 mg total) by mouth daily with breakfast. for 10 days 20 tablet 11/22/2022 12/2/2022 López Banerjee MD    levETIRAcetam (KEPPRA) 500 MG Tab Take 1 tablet (500 mg total) by mouth 2 (two) times daily. 60 tablet 11/22/2022 12/22/2022 López Banerjee MD          Follow-up Information    None          Gianni Cottrell MD  11/22/22 4980

## 2022-11-22 VITALS
HEART RATE: 80 BPM | OXYGEN SATURATION: 96 % | TEMPERATURE: 98 F | DIASTOLIC BLOOD PRESSURE: 55 MMHG | RESPIRATION RATE: 13 BRPM | SYSTOLIC BLOOD PRESSURE: 150 MMHG | WEIGHT: 190 LBS

## 2022-11-22 PROCEDURE — 25000003 PHARM REV CODE 250: Performed by: SPECIALIST

## 2022-11-22 RX ORDER — LEVETIRACETAM 500 MG/1
500 TABLET ORAL 2 TIMES DAILY
Qty: 60 TABLET | Refills: 0 | Status: SHIPPED | OUTPATIENT
Start: 2022-11-22 | End: 2023-03-31 | Stop reason: SDUPTHER

## 2022-11-22 RX ORDER — DEXAMETHASONE 2 MG/1
4 TABLET ORAL
Qty: 20 TABLET | Refills: 0 | Status: ON HOLD | OUTPATIENT
Start: 2022-11-22 | End: 2022-11-30 | Stop reason: HOSPADM

## 2022-11-22 RX ORDER — LEVETIRACETAM 500 MG/1
500 TABLET ORAL ONCE
Status: COMPLETED | OUTPATIENT
Start: 2022-11-22 | End: 2022-11-22

## 2022-11-22 RX ORDER — ALPRAZOLAM 0.5 MG/1
0.5 TABLET ORAL
Status: COMPLETED | OUTPATIENT
Start: 2022-11-22 | End: 2022-11-22

## 2022-11-22 RX ADMIN — ALPRAZOLAM 0.5 MG: 0.5 TABLET ORAL at 02:11

## 2022-11-22 RX ADMIN — LEVETIRACETAM 500 MG: 500 TABLET, FILM COATED ORAL at 02:11

## 2022-11-22 NOTE — ED NOTES
Tosin called here to say [facility] in [place] is still on diversion and does not know when they will go off. They have 30 pts in there er.dr haider informed as well as the family. Ariel say she would liik for another place but family wants this hospital since family live there

## 2022-11-22 NOTE — ED NOTES
Spoke with Lay RN from [facility] transfer team awaiting on surgeon to get out of surgery will have her speak with Dr. Cottrell once out of surgery.

## 2022-11-22 NOTE — ED NOTES
Spoke to [email or web address] center in [place]/ informed since been in the er for 24hrs and is stable dr haider will be dioscharging her/ the fly [facility]

## 2022-11-22 NOTE — ED NOTES
[facility]  [facility] NO called they or on diversion images have been viewed per MD patient accepted will go to ED once off diversion.

## 2022-11-23 ENCOUNTER — HOSPITAL ENCOUNTER (INPATIENT)
Facility: HOSPITAL | Age: 87
LOS: 7 days | Discharge: NURSING, REHAB OR LONG-TERM CARE | DRG: 598 | End: 2022-12-01
Attending: EMERGENCY MEDICINE | Admitting: STUDENT IN AN ORGANIZED HEALTH CARE EDUCATION/TRAINING PROGRAM
Payer: MEDICARE

## 2022-11-23 DIAGNOSIS — E11.9 TYPE 2 DIABETES MELLITUS WITHOUT COMPLICATION, WITHOUT LONG-TERM CURRENT USE OF INSULIN: ICD-10-CM

## 2022-11-23 DIAGNOSIS — G93.89 BRAIN MASS: ICD-10-CM

## 2022-11-23 DIAGNOSIS — R92.8 OTHER ABNORMAL AND INCONCLUSIVE FINDINGS ON DIAGNOSTIC IMAGING OF BREAST: ICD-10-CM

## 2022-11-23 DIAGNOSIS — N63.20 MASS OF LEFT BREAST, UNSPECIFIED QUADRANT: ICD-10-CM

## 2022-11-23 DIAGNOSIS — R07.9 CHEST PAIN: ICD-10-CM

## 2022-11-23 DIAGNOSIS — R93.89 ABNORMAL FINDING ON IMAGING: Primary | ICD-10-CM

## 2022-11-23 DIAGNOSIS — T38.0X5A ADVERSE EFFECT OF GLUCOCORTICOID OR SYNTHETIC ANALOGUE, INITIAL ENCOUNTER: ICD-10-CM

## 2022-11-23 LAB
ALBUMIN SERPL BCP-MCNC: 3.9 G/DL (ref 3.5–5.2)
ALP SERPL-CCNC: 85 U/L (ref 55–135)
ALT SERPL W/O P-5'-P-CCNC: 22 U/L (ref 10–44)
ANION GAP SERPL CALC-SCNC: 13 MMOL/L (ref 8–16)
AST SERPL-CCNC: 20 U/L (ref 10–40)
BASOPHILS # BLD AUTO: 0.06 K/UL (ref 0–0.2)
BASOPHILS NFR BLD: 0.5 % (ref 0–1.9)
BILIRUB SERPL-MCNC: 1 MG/DL (ref 0.1–1)
BUN SERPL-MCNC: 20 MG/DL (ref 8–23)
CALCIUM SERPL-MCNC: 9.7 MG/DL (ref 8.7–10.5)
CHLORIDE SERPL-SCNC: 96 MMOL/L (ref 95–110)
CO2 SERPL-SCNC: 25 MMOL/L (ref 23–29)
CREAT SERPL-MCNC: 1.2 MG/DL (ref 0.5–1.4)
DIFFERENTIAL METHOD: ABNORMAL
EOSINOPHIL # BLD AUTO: 0 K/UL (ref 0–0.5)
EOSINOPHIL NFR BLD: 0.3 % (ref 0–8)
ERYTHROCYTE [DISTWIDTH] IN BLOOD BY AUTOMATED COUNT: 13.9 % (ref 11.5–14.5)
EST. GFR  (NO RACE VARIABLE): 43.8 ML/MIN/1.73 M^2
GLUCOSE SERPL-MCNC: 358 MG/DL (ref 70–110)
HCT VFR BLD AUTO: 47.1 % (ref 37–48.5)
HCV AB SERPL QL IA: NORMAL
HGB BLD-MCNC: 15.9 G/DL (ref 12–16)
HIV 1+2 AB+HIV1 P24 AG SERPL QL IA: NORMAL
IMM GRANULOCYTES # BLD AUTO: 0.19 K/UL (ref 0–0.04)
IMM GRANULOCYTES NFR BLD AUTO: 1.6 % (ref 0–0.5)
LYMPHOCYTES # BLD AUTO: 1.5 K/UL (ref 1–4.8)
LYMPHOCYTES NFR BLD: 13.2 % (ref 18–48)
MAGNESIUM SERPL-MCNC: 1.8 MG/DL (ref 1.6–2.6)
MCH RBC QN AUTO: 29.2 PG (ref 27–31)
MCHC RBC AUTO-ENTMCNC: 33.8 G/DL (ref 32–36)
MCV RBC AUTO: 86 FL (ref 82–98)
MONOCYTES # BLD AUTO: 0.3 K/UL (ref 0.3–1)
MONOCYTES NFR BLD: 2.2 % (ref 4–15)
NEUTROPHILS # BLD AUTO: 9.5 K/UL (ref 1.8–7.7)
NEUTROPHILS NFR BLD: 82.2 % (ref 38–73)
NRBC BLD-RTO: 0 /100 WBC
PLATELET # BLD AUTO: 278 K/UL (ref 150–450)
PMV BLD AUTO: 11.1 FL (ref 9.2–12.9)
POTASSIUM SERPL-SCNC: 3.8 MMOL/L (ref 3.5–5.1)
PROT SERPL-MCNC: 6.9 G/DL (ref 6–8.4)
RBC # BLD AUTO: 5.45 M/UL (ref 4–5.4)
SODIUM SERPL-SCNC: 134 MMOL/L (ref 136–145)
WBC # BLD AUTO: 11.6 K/UL (ref 3.9–12.7)

## 2022-11-23 PROCEDURE — 99223 PR INITIAL HOSPITAL CARE,LEVL III: ICD-10-PCS | Mod: GC,,, | Performed by: STUDENT IN AN ORGANIZED HEALTH CARE EDUCATION/TRAINING PROGRAM

## 2022-11-23 PROCEDURE — 99285 PR EMERGENCY DEPT VISIT,LEVEL V: ICD-10-PCS | Mod: ,,, | Performed by: EMERGENCY MEDICINE

## 2022-11-23 PROCEDURE — 86803 HEPATITIS C AB TEST: CPT | Performed by: PHYSICIAN ASSISTANT

## 2022-11-23 PROCEDURE — 83735 ASSAY OF MAGNESIUM: CPT | Performed by: EMERGENCY MEDICINE

## 2022-11-23 PROCEDURE — 94761 N-INVAS EAR/PLS OXIMETRY MLT: CPT

## 2022-11-23 PROCEDURE — 93010 EKG 12-LEAD: ICD-10-PCS | Mod: ,,, | Performed by: INTERNAL MEDICINE

## 2022-11-23 PROCEDURE — 87389 HIV-1 AG W/HIV-1&-2 AB AG IA: CPT | Performed by: PHYSICIAN ASSISTANT

## 2022-11-23 PROCEDURE — 93005 ELECTROCARDIOGRAM TRACING: CPT

## 2022-11-23 PROCEDURE — 85025 COMPLETE CBC W/AUTO DIFF WBC: CPT | Performed by: EMERGENCY MEDICINE

## 2022-11-23 PROCEDURE — 96375 TX/PRO/DX INJ NEW DRUG ADDON: CPT

## 2022-11-23 PROCEDURE — 96374 THER/PROPH/DIAG INJ IV PUSH: CPT

## 2022-11-23 PROCEDURE — 80053 COMPREHEN METABOLIC PANEL: CPT | Performed by: EMERGENCY MEDICINE

## 2022-11-23 PROCEDURE — 99223 1ST HOSP IP/OBS HIGH 75: CPT | Mod: GC,,, | Performed by: STUDENT IN AN ORGANIZED HEALTH CARE EDUCATION/TRAINING PROGRAM

## 2022-11-23 PROCEDURE — 99285 EMERGENCY DEPT VISIT HI MDM: CPT | Mod: ,,, | Performed by: EMERGENCY MEDICINE

## 2022-11-23 PROCEDURE — 99285 EMERGENCY DEPT VISIT HI MDM: CPT | Mod: 25

## 2022-11-23 PROCEDURE — 63600175 PHARM REV CODE 636 W HCPCS: Performed by: STUDENT IN AN ORGANIZED HEALTH CARE EDUCATION/TRAINING PROGRAM

## 2022-11-23 PROCEDURE — 93010 ELECTROCARDIOGRAM REPORT: CPT | Mod: ,,, | Performed by: INTERNAL MEDICINE

## 2022-11-23 RX ORDER — LORAZEPAM 2 MG/ML
1 INJECTION INTRAMUSCULAR
Status: COMPLETED | OUTPATIENT
Start: 2022-11-23 | End: 2022-11-23

## 2022-11-23 RX ORDER — DEXAMETHASONE SODIUM PHOSPHATE 4 MG/ML
4 INJECTION, SOLUTION INTRA-ARTICULAR; INTRALESIONAL; INTRAMUSCULAR; INTRAVENOUS; SOFT TISSUE EVERY 6 HOURS
Status: DISCONTINUED | OUTPATIENT
Start: 2022-11-24 | End: 2022-11-30

## 2022-11-23 RX ORDER — LORAZEPAM 2 MG/ML
0.5 INJECTION INTRAMUSCULAR
Status: DISCONTINUED | OUTPATIENT
Start: 2022-11-23 | End: 2022-11-23

## 2022-11-23 RX ORDER — GADOBUTROL 604.72 MG/ML
9 INJECTION INTRAVENOUS
Status: COMPLETED | OUTPATIENT
Start: 2022-11-24 | End: 2022-11-23

## 2022-11-23 RX ORDER — LEVETIRACETAM 500 MG/5ML
500 INJECTION, SOLUTION, CONCENTRATE INTRAVENOUS EVERY 12 HOURS
Status: DISCONTINUED | OUTPATIENT
Start: 2022-11-24 | End: 2022-11-26

## 2022-11-23 RX ADMIN — GADOBUTROL 9 ML: 604.72 INJECTION INTRAVENOUS at 11:11

## 2022-11-23 RX ADMIN — DEXAMETHASONE SODIUM PHOSPHATE 4 MG: 4 INJECTION INTRA-ARTICULAR; INTRALESIONAL; INTRAMUSCULAR; INTRAVENOUS; SOFT TISSUE at 11:11

## 2022-11-23 RX ADMIN — LORAZEPAM 1 MG: 2 INJECTION INTRAMUSCULAR; INTRAVENOUS at 10:11

## 2022-11-24 PROBLEM — R73.9 HYPERGLYCEMIA: Status: ACTIVE | Noted: 2022-11-24

## 2022-11-24 LAB
ABO + RH BLD: NORMAL
ALBUMIN SERPL BCP-MCNC: 3.6 G/DL (ref 3.5–5.2)
ALP SERPL-CCNC: 81 U/L (ref 55–135)
ALT SERPL W/O P-5'-P-CCNC: 17 U/L (ref 10–44)
ANION GAP SERPL CALC-SCNC: 11 MMOL/L (ref 8–16)
APTT BLDCRRT: 22.4 SEC (ref 21–32)
AST SERPL-CCNC: 16 U/L (ref 10–40)
BASOPHILS # BLD AUTO: 0.04 K/UL (ref 0–0.2)
BASOPHILS NFR BLD: 0.4 % (ref 0–1.9)
BILIRUB SERPL-MCNC: 1.2 MG/DL (ref 0.1–1)
BLD GP AB SCN CELLS X3 SERPL QL: NORMAL
BUN SERPL-MCNC: 16 MG/DL (ref 8–23)
CALCIUM SERPL-MCNC: 9.3 MG/DL (ref 8.7–10.5)
CHLORIDE SERPL-SCNC: 98 MMOL/L (ref 95–110)
CO2 SERPL-SCNC: 24 MMOL/L (ref 23–29)
CREAT SERPL-MCNC: 0.8 MG/DL (ref 0.5–1.4)
DIFFERENTIAL METHOD: ABNORMAL
EOSINOPHIL # BLD AUTO: 0 K/UL (ref 0–0.5)
EOSINOPHIL NFR BLD: 0.1 % (ref 0–8)
ERYTHROCYTE [DISTWIDTH] IN BLOOD BY AUTOMATED COUNT: 13.8 % (ref 11.5–14.5)
EST. GFR  (NO RACE VARIABLE): >60 ML/MIN/1.73 M^2
GLUCOSE SERPL-MCNC: 274 MG/DL (ref 70–110)
HCT VFR BLD AUTO: 44.5 % (ref 37–48.5)
HGB BLD-MCNC: 14.8 G/DL (ref 12–16)
IMM GRANULOCYTES # BLD AUTO: 0.15 K/UL (ref 0–0.04)
IMM GRANULOCYTES NFR BLD AUTO: 1.4 % (ref 0–0.5)
INR PPP: 1 (ref 0.8–1.2)
LYMPHOCYTES # BLD AUTO: 1.6 K/UL (ref 1–4.8)
LYMPHOCYTES NFR BLD: 14.5 % (ref 18–48)
MAGNESIUM SERPL-MCNC: 1.9 MG/DL (ref 1.6–2.6)
MCH RBC QN AUTO: 28.6 PG (ref 27–31)
MCHC RBC AUTO-ENTMCNC: 33.3 G/DL (ref 32–36)
MCV RBC AUTO: 86 FL (ref 82–98)
MONOCYTES # BLD AUTO: 0.3 K/UL (ref 0.3–1)
MONOCYTES NFR BLD: 2.7 % (ref 4–15)
NEUTROPHILS # BLD AUTO: 9 K/UL (ref 1.8–7.7)
NEUTROPHILS NFR BLD: 80.9 % (ref 38–73)
NRBC BLD-RTO: 0 /100 WBC
PHOSPHATE SERPL-MCNC: 2.6 MG/DL (ref 2.7–4.5)
PLATELET # BLD AUTO: 264 K/UL (ref 150–450)
PMV BLD AUTO: 11.6 FL (ref 9.2–12.9)
POTASSIUM SERPL-SCNC: 3.6 MMOL/L (ref 3.5–5.1)
PROT SERPL-MCNC: 6.3 G/DL (ref 6–8.4)
PROTHROMBIN TIME: 10.3 SEC (ref 9–12.5)
RBC # BLD AUTO: 5.18 M/UL (ref 4–5.4)
SODIUM SERPL-SCNC: 133 MMOL/L (ref 136–145)
WBC # BLD AUTO: 11.11 K/UL (ref 3.9–12.7)

## 2022-11-24 PROCEDURE — 11000001 HC ACUTE MED/SURG PRIVATE ROOM

## 2022-11-24 PROCEDURE — 99223 1ST HOSP IP/OBS HIGH 75: CPT | Mod: ,,, | Performed by: INTERNAL MEDICINE

## 2022-11-24 PROCEDURE — 86901 BLOOD TYPING SEROLOGIC RH(D): CPT

## 2022-11-24 PROCEDURE — 94761 N-INVAS EAR/PLS OXIMETRY MLT: CPT

## 2022-11-24 PROCEDURE — 85025 COMPLETE CBC W/AUTO DIFF WBC: CPT | Performed by: INTERNAL MEDICINE

## 2022-11-24 PROCEDURE — 36415 COLL VENOUS BLD VENIPUNCTURE: CPT | Performed by: INTERNAL MEDICINE

## 2022-11-24 PROCEDURE — 84100 ASSAY OF PHOSPHORUS: CPT | Performed by: INTERNAL MEDICINE

## 2022-11-24 PROCEDURE — 25000003 PHARM REV CODE 250: Performed by: INTERNAL MEDICINE

## 2022-11-24 PROCEDURE — 63600175 PHARM REV CODE 636 W HCPCS

## 2022-11-24 PROCEDURE — 80053 COMPREHEN METABOLIC PANEL: CPT | Performed by: INTERNAL MEDICINE

## 2022-11-24 PROCEDURE — 83735 ASSAY OF MAGNESIUM: CPT | Performed by: INTERNAL MEDICINE

## 2022-11-24 PROCEDURE — 25000003 PHARM REV CODE 250: Performed by: HOSPITALIST

## 2022-11-24 PROCEDURE — A9585 GADOBUTROL INJECTION: HCPCS | Performed by: EMERGENCY MEDICINE

## 2022-11-24 PROCEDURE — 25500020 PHARM REV CODE 255: Performed by: EMERGENCY MEDICINE

## 2022-11-24 PROCEDURE — 99222 1ST HOSP IP/OBS MODERATE 55: CPT | Mod: GC,,, | Performed by: STUDENT IN AN ORGANIZED HEALTH CARE EDUCATION/TRAINING PROGRAM

## 2022-11-24 PROCEDURE — 99223 PR INITIAL HOSPITAL CARE,LEVL III: ICD-10-PCS | Mod: ,,, | Performed by: INTERNAL MEDICINE

## 2022-11-24 PROCEDURE — 85730 THROMBOPLASTIN TIME PARTIAL: CPT

## 2022-11-24 PROCEDURE — 99222 PR INITIAL HOSPITAL CARE,LEVL II: ICD-10-PCS | Mod: GC,,, | Performed by: STUDENT IN AN ORGANIZED HEALTH CARE EDUCATION/TRAINING PROGRAM

## 2022-11-24 PROCEDURE — 25500020 PHARM REV CODE 255: Performed by: HOSPITALIST

## 2022-11-24 PROCEDURE — 85610 PROTHROMBIN TIME: CPT

## 2022-11-24 RX ORDER — SODIUM CHLORIDE 0.9 % (FLUSH) 0.9 %
10 SYRINGE (ML) INJECTION
Status: DISCONTINUED | OUTPATIENT
Start: 2022-11-24 | End: 2022-12-01 | Stop reason: HOSPADM

## 2022-11-24 RX ORDER — LEVOTHYROXINE SODIUM 75 UG/1
75 TABLET ORAL DAILY
Status: DISCONTINUED | OUTPATIENT
Start: 2022-11-24 | End: 2022-12-01 | Stop reason: HOSPADM

## 2022-11-24 RX ORDER — SODIUM CHLORIDE 9 MG/ML
INJECTION, SOLUTION INTRAVENOUS CONTINUOUS
Status: DISCONTINUED | OUTPATIENT
Start: 2022-11-24 | End: 2022-11-24

## 2022-11-24 RX ORDER — PANTOPRAZOLE SODIUM 20 MG/1
40 TABLET, DELAYED RELEASE ORAL 2 TIMES DAILY
Status: DISCONTINUED | OUTPATIENT
Start: 2022-11-24 | End: 2022-12-01 | Stop reason: HOSPADM

## 2022-11-24 RX ORDER — ATENOLOL 50 MG/1
50 TABLET ORAL DAILY
Status: DISCONTINUED | OUTPATIENT
Start: 2022-11-24 | End: 2022-11-25

## 2022-11-24 RX ORDER — ONDANSETRON 4 MG/1
4 TABLET, ORALLY DISINTEGRATING ORAL EVERY 8 HOURS PRN
Status: DISCONTINUED | OUTPATIENT
Start: 2022-11-24 | End: 2022-12-01 | Stop reason: HOSPADM

## 2022-11-24 RX ORDER — CETIRIZINE HYDROCHLORIDE 5 MG/1
5 TABLET ORAL DAILY
Status: DISCONTINUED | OUTPATIENT
Start: 2022-11-24 | End: 2022-12-01 | Stop reason: HOSPADM

## 2022-11-24 RX ORDER — TALC
6 POWDER (GRAM) TOPICAL NIGHTLY PRN
Status: DISCONTINUED | OUTPATIENT
Start: 2022-11-24 | End: 2022-12-01 | Stop reason: HOSPADM

## 2022-11-24 RX ORDER — NALOXONE HCL 0.4 MG/ML
0.02 VIAL (ML) INJECTION
Status: DISCONTINUED | OUTPATIENT
Start: 2022-11-24 | End: 2022-12-01 | Stop reason: HOSPADM

## 2022-11-24 RX ORDER — IBUPROFEN 200 MG
16 TABLET ORAL
Status: DISCONTINUED | OUTPATIENT
Start: 2022-11-24 | End: 2022-11-25

## 2022-11-24 RX ORDER — ACETAMINOPHEN 325 MG/1
650 TABLET ORAL EVERY 8 HOURS PRN
Status: DISCONTINUED | OUTPATIENT
Start: 2022-11-24 | End: 2022-12-01 | Stop reason: HOSPADM

## 2022-11-24 RX ORDER — IBUPROFEN 200 MG
24 TABLET ORAL
Status: DISCONTINUED | OUTPATIENT
Start: 2022-11-24 | End: 2022-11-25

## 2022-11-24 RX ORDER — GLUCAGON 1 MG
1 KIT INJECTION
Status: DISCONTINUED | OUTPATIENT
Start: 2022-11-24 | End: 2022-11-25

## 2022-11-24 RX ORDER — SIMETHICONE 80 MG
1 TABLET,CHEWABLE ORAL 4 TIMES DAILY PRN
Status: DISCONTINUED | OUTPATIENT
Start: 2022-11-24 | End: 2022-12-01 | Stop reason: HOSPADM

## 2022-11-24 RX ORDER — POLYETHYLENE GLYCOL 3350 17 G/17G
17 POWDER, FOR SOLUTION ORAL 2 TIMES DAILY PRN
Status: DISCONTINUED | OUTPATIENT
Start: 2022-11-24 | End: 2022-12-01 | Stop reason: HOSPADM

## 2022-11-24 RX ORDER — IPRATROPIUM BROMIDE AND ALBUTEROL SULFATE 2.5; .5 MG/3ML; MG/3ML
3 SOLUTION RESPIRATORY (INHALATION) EVERY 6 HOURS PRN
Status: DISCONTINUED | OUTPATIENT
Start: 2022-11-24 | End: 2022-12-01 | Stop reason: HOSPADM

## 2022-11-24 RX ORDER — PROCHLORPERAZINE EDISYLATE 5 MG/ML
5 INJECTION INTRAMUSCULAR; INTRAVENOUS EVERY 6 HOURS PRN
Status: DISCONTINUED | OUTPATIENT
Start: 2022-11-24 | End: 2022-12-01 | Stop reason: HOSPADM

## 2022-11-24 RX ORDER — HYDROCHLOROTHIAZIDE 12.5 MG/1
12.5 TABLET ORAL DAILY
Status: DISCONTINUED | OUTPATIENT
Start: 2022-11-24 | End: 2022-11-24

## 2022-11-24 RX ORDER — ALPRAZOLAM 0.25 MG/1
0.5 TABLET ORAL DAILY PRN
Status: DISCONTINUED | OUTPATIENT
Start: 2022-11-24 | End: 2022-12-01 | Stop reason: HOSPADM

## 2022-11-24 RX ORDER — PANTOPRAZOLE SODIUM 20 MG/1
40 TABLET, DELAYED RELEASE ORAL DAILY
Status: DISCONTINUED | OUTPATIENT
Start: 2022-11-24 | End: 2022-11-24

## 2022-11-24 RX ORDER — CELECOXIB 200 MG/1
200 CAPSULE ORAL DAILY
Status: DISCONTINUED | OUTPATIENT
Start: 2022-11-24 | End: 2022-11-25

## 2022-11-24 RX ADMIN — DEXAMETHASONE SODIUM PHOSPHATE 4 MG: 4 INJECTION INTRA-ARTICULAR; INTRALESIONAL; INTRAMUSCULAR; INTRAVENOUS; SOFT TISSUE at 06:11

## 2022-11-24 RX ADMIN — PANTOPRAZOLE SODIUM 40 MG: 20 TABLET, DELAYED RELEASE ORAL at 09:11

## 2022-11-24 RX ADMIN — DEXAMETHASONE SODIUM PHOSPHATE 4 MG: 4 INJECTION INTRA-ARTICULAR; INTRALESIONAL; INTRAMUSCULAR; INTRAVENOUS; SOFT TISSUE at 11:11

## 2022-11-24 RX ADMIN — CELECOXIB 200 MG: 200 CAPSULE ORAL at 08:11

## 2022-11-24 RX ADMIN — DEXAMETHASONE SODIUM PHOSPHATE 4 MG: 4 INJECTION INTRA-ARTICULAR; INTRALESIONAL; INTRAMUSCULAR; INTRAVENOUS; SOFT TISSUE at 05:11

## 2022-11-24 RX ADMIN — LEVETIRACETAM 500 MG: 100 INJECTION, SOLUTION INTRAVENOUS at 08:11

## 2022-11-24 RX ADMIN — PANTOPRAZOLE SODIUM 40 MG: 20 TABLET, DELAYED RELEASE ORAL at 08:11

## 2022-11-24 RX ADMIN — ATENOLOL 50 MG: 50 TABLET ORAL at 08:11

## 2022-11-24 RX ADMIN — SODIUM CHLORIDE: 0.9 INJECTION, SOLUTION INTRAVENOUS at 02:11

## 2022-11-24 RX ADMIN — ACETAMINOPHEN 650 MG: 325 TABLET ORAL at 09:11

## 2022-11-24 RX ADMIN — IOHEXOL 75 ML: 350 INJECTION, SOLUTION INTRAVENOUS at 08:11

## 2022-11-24 RX ADMIN — LEVETIRACETAM 500 MG: 100 INJECTION, SOLUTION INTRAVENOUS at 09:11

## 2022-11-24 RX ADMIN — HYDROCHLOROTHIAZIDE 12.5 MG: 12.5 TABLET ORAL at 08:11

## 2022-11-24 RX ADMIN — CETIRIZINE HYDROCHLORIDE 5 MG: 5 TABLET, FILM COATED ORAL at 08:11

## 2022-11-24 RX ADMIN — LEVOTHYROXINE SODIUM 75 MCG: 75 TABLET ORAL at 08:11

## 2022-11-24 NOTE — ED NOTES
Telemetry Verification   Patient placed on Telemetry Box  Verified with War Room  Box # [id number]   Monitor Tech Taylor   Rate 78   Rhythm Normal Sinus

## 2022-11-24 NOTE — ASSESSMENT & PLAN NOTE
Acute, likely from steroid injection  Will monitor  Does not have history of diabetes  SSI if needed

## 2022-11-24 NOTE — H&P
[facility] - Neurosurgery ([facility])  [facility] Medicine  History & Physical    Patient Name: Candy Oreilly  MRN: [id number]  Patient Class: IP- Inpatient  Admission Date: 11/23/2022  Attending Physician: Robert Da Silva*   Primary Care Provider: Chata Hurley MD       Patient information was obtained from patient, relative(s), past medical records and ER records.     Subjective:     Principal Problem:Brain mass    Chief Complaint:   Chief Complaint   Patient presents with    Abnormal Ct Scan     Reports of Brain Mass on head CT. Was suppose to be transferred here on 11/21 but daughter stated would bring her here herself        HPI: 88 yo female with CAD, GERD, thyroid disease, arthritis, anxiety presenting to outside hospital for difficulty with word finding and mild disorientation transferred to [facility] for concern of L temporal brain mass. She has been having syncopal episodes as well during this past week as well during the word finding difficulties. She had some LLE tingling as well. This was not getting any better so her family brought her in for evaluation to outside ED. CT showing brain mass, transferred here for DEV vela. She denies any long history of smoking, she denies any weight loss fevers, no abnormal vaginal bleeding. She does occasionally get hemorrhoidal bleeding. She does report some diarrhea that last a while a few weeks ago, but has been clearing up. She denies any melena or BRBPR. She did have normal screening exams but those ended 2/2 age.     In ED, DEV and NCC dane'shalom, approved for floor admission to medicine. Patient started on keppra and steroids. Medicine called for possible cancer workup.      Past Medical History:   Diagnosis Date    Anxiety disorder, unspecified     Coronary artery disease     GERD (gastroesophageal reflux disease)     Thyroid disease        Past Surgical History:   Procedure Laterality Date    APPENDECTOMY      CHOLECYSTECTOMY         Review of  patient's allergies indicates:   Allergen Reactions    Pregabalin Swelling    Atorvastatin Other (See Comments)     Muscle weakness      Dexlansoprazole Other (See Comments)     Other reaction(s): HAND SPASMS  Muscle weakness      Ezetimibe Other (See Comments)     Other reaction(s): MUSCLE PAIN  Muscle pain      Gabapentin Other (See Comments)     Other reaction(s): MAKES HER DIZZY  Dizziness      Onabotulinumtoxina      Medical botox    Oxybutynin Other (See Comments)     Other reaction(s): CANT PEE  Stopped urine flow      Rosuvastatin Other (See Comments)     Leg muscle pain      Sertraline Other (See Comments)     Shaking of hands      Meperidine Nausea Only       No current facility-administered medications on file prior to encounter.     Current Outpatient Medications on File Prior to Encounter   Medication Sig    ALPRAZolam (XANAX) 0.5 MG tablet Take 0.5 mg by mouth daily as needed.    atenoloL (TENORMIN) 50 MG tablet Take 1 tablet by mouth once daily.    celecoxib (CELEBREX) 200 MG capsule Take 1 capsule by mouth once daily.    citalopram (CELEXA) 20 MG tablet Take 1 tablet by mouth once daily.    dexAMETHasone (DECADRON) 2 MG tablet Take 2 tablets (4 mg total) by mouth daily with breakfast. for 10 days    fexofenadine (ALLEGRA) 180 MG tablet Take 1 tablet by mouth once daily.    fluticasone propionate (FLONASE) 50 mcg/actuation nasal spray 1 spray by Each Nostril route once daily.    hydroCHLOROthiazide (HYDRODIURIL) 25 MG tablet Take 0.5 tablets by mouth once daily.    levETIRAcetam (KEPPRA) 500 MG Tab Take 1 tablet (500 mg total) by mouth 2 (two) times daily.    levothyroxine (SYNTHROID) 75 MCG tablet Take 1 tablet by mouth once daily.    pantoprazole (PROTONIX) 40 MG tablet Take 1 tablet by mouth once daily.     Family History       Problem Relation (Age of Onset)    Breast cancer Sister    Cancer Brother    Colon cancer Father    Lung cancer Father    Thyroid cancer Mother           Tobacco Use    Smoking status: Never    Smokeless tobacco: Never   Substance and Sexual Activity    Alcohol use: Not on file    Drug use: Not on file    Sexual activity: Not on file     Review of Systems   Constitutional:  Positive for activity change. Negative for appetite change, chills and fever.   HENT:  Negative for congestion, hearing loss and rhinorrhea.    Eyes:  Negative for discharge, itching and visual disturbance.   Respiratory:  Negative for apnea, cough and shortness of breath.    Cardiovascular:  Negative for chest pain, palpitations and leg swelling.   Gastrointestinal:  Positive for diarrhea. Negative for abdominal distention, abdominal pain, constipation, nausea and vomiting.   Endocrine: Negative for cold intolerance and heat intolerance.   Genitourinary:  Negative for dysuria and hematuria.   Musculoskeletal:  Negative for back pain, neck pain and neck stiffness.   Skin:  Negative for rash and wound.   Neurological:  Positive for syncope, speech difficulty and light-headedness. Negative for dizziness, seizures and headaches.   Psychiatric/Behavioral:  Negative for agitation, confusion and suicidal ideas.    Objective:     Vital Signs (Most Recent):  Temp: 97.6 °F (36.4 °C) (11/24/22 0307)  Pulse: 69 (11/24/22 0317)  Resp: 20 (11/24/22 0307)  BP: (!) 160/72 (11/24/22 0307)  SpO2: (!) 94 % (11/24/22 0307)   Vital Signs (24h Range):  Temp:  [97.4 °F (36.3 °C)-98.2 °F (36.8 °C)] 97.6 °F (36.4 °C)  Pulse:  [61-69] 69  Resp:  [15-20] 20  SpO2:  [94 %-99 %] 94 %  BP: (140-187)/(63-83) 160/72     Weight: 86.2 kg (190 lb)  There is no height or weight on file to calculate BMI.    Physical Exam  Vitals reviewed.   Constitutional:       General: She is not in acute distress.     Appearance: She is well-developed.   HENT:      Head: Normocephalic and atraumatic.      Nose: Nose normal. No rhinorrhea.      Mouth/Throat:      Mouth: Mucous membranes are moist.   Eyes:      General: No scleral icterus.  "       Right eye: No discharge.         Left eye: No discharge.      Pupils: Pupils are equal, round, and reactive to light.   Neck:      Vascular: No JVD.   Cardiovascular:      Rate and Rhythm: Normal rate and regular rhythm.      Heart sounds: Normal heart sounds. No murmur heard.    No friction rub.   Pulmonary:      Effort: Pulmonary effort is normal. No respiratory distress.      Breath sounds: Normal breath sounds. No wheezing.   Abdominal:      General: Bowel sounds are normal. There is no distension.      Palpations: Abdomen is soft.      Tenderness: There is no abdominal tenderness.   Musculoskeletal:         General: No deformity. Normal range of motion.      Cervical back: Normal range of motion and neck supple.   Skin:     General: Skin is warm and dry.   Neurological:      General: No focal deficit present.      Mental Status: She is alert and oriented to person, place, and time.   Psychiatric:         Mood and Affect: Mood normal.         Behavior: Behavior normal.         CRANIAL NERVES     CN III, IV, VI   Pupils are equal, round, and reactive to light.     Significant Labs: All pertinent labs within the past 24 hours have been reviewed.  CBC:   Recent Labs   Lab 11/23/22  1810   WBC 11.60   HGB 15.9   HCT 47.1        CMP:   Recent Labs   Lab 11/23/22  1810   *   K 3.8   CL 96   CO2 25   *   BUN 20   CREATININE 1.2   CALCIUM 9.7   PROT 6.9   ALBUMIN 3.9   BILITOT 1.0   ALKPHOS 85   AST 20   ALT 22   ANIONGAP 13       Significant Imaging: I have reviewed all pertinent imaging results/findings within the past 24 hours.    Assessment/Plan:     * Brain mass  NSGY consulted per recs,   "--Admit to  for metastatic workup         -q4h neurochecks on floor  --All labs and diagnostics reviewed  --Follow-up MRI w/wo contrast w/ [facility]         -CT chest/abdo/pelvis if suspected mets  --SBP <160 (cardene ggt; hydralazine & labetalol PRN; transition to home meds when appropriate)  --Keppra " "500 BID  --Dex 4q6 x7d  --Follow-up full pre-op labs (CBC/CMP/PT-INR/PTT/T&S)  --PUD PPx while steroid treatment ongoing  --Continue to monitor clinically, notify NSGY immediately with any changes in neuro status   "  Will consult Heme/onc as well    Hyperglycemia  Acute, likely from steroid injection  Will monitor  Does not have history of diabetes  SSI if needed      Hypercholesterolemia  Chronic, controlled, continue statin      Gastroesophageal reflux disease  Chronic, controlled, continue PPI      Disorder of thyroid  Chronic, controlled, continue synthroid      Anxiety  Chronic, controlled, PRN benzo if needed      VTE Risk Mitigation (From admission, onward)         Ordered     IP VTE HIGH RISK PATIENT  Once         11/24/22 0232     Place sequential compression device  Until discontinued         11/24/22 0232     Reason for No Pharmacological VTE Prophylaxis  Once        Question:  Reasons:  Answer:  Risk of Bleeding    11/24/22 0232                   Santos Lan MD  Department of Hospital Medicine   [facility] - Neurosurgery ([facility])  "

## 2022-11-24 NOTE — HPI
"Ms. Candy Oreilly is an 87 year old woman with CAD, hypothyroidism who presented as a transfer for temporal brain mass. She initially presented with difficulty with word finding and mild disorientation. She has associated headaches and nausea. She had a MRI brain on 11/23 that showed enhancing mass in the medial temporal lobe. When she was admitted, Neurosurgery was consulted. She was started on dexamethasone and keppra. She is a never smoker. Regarding family history, she says that she had a father with lung, prostate, colon cancer, a mother with thyroid cancer, a sister with breast cancer and an uncle with "multiple" cancers.    Medical Oncology was consulted for brain mass.  "

## 2022-11-24 NOTE — CONSULTS
[facility] - Emergency Dept  Neurocritical Care  Consult Note    Admit Date: 11/23/2022  Service Date: 11/24/2022  Length of Stay: 0    Consults  Subjective:     Chief Complaint: Brain mass    History of Present Illness: Candy Oreilly is a 87 y.o. female with PMH  has a past medical history of Anxiety disorder, unspecified, Coronary artery disease, GERD (gastroesophageal reflux disease), arthritis and Thyroid disease. Who presents with a temporal brain mass.  Patient has been having episodes of word finding difficulty & presyncope, associated LLE tingling for about a week, she also noticed generalized weakness and fatigue during this time. She went to OSH ED on Monday where a CTH was done and found to have a brain mass. She went home then presented to the ED at [facility] [place] today to get evaluated for same complaints with neuro services.   Patient started taking the ED prescribed keppra and decadron today. NSGY consulted for CTH showing L temporal hemorrhagic brain mass. MRI was done and showed previously noted mass, without major mass effects.      Past Medical History:   Diagnosis Date    Anxiety disorder, unspecified     Coronary artery disease     GERD (gastroesophageal reflux disease)     Thyroid disease      Past Surgical History:   Procedure Laterality Date    APPENDECTOMY      CHOLECYSTECTOMY        No current facility-administered medications on file prior to encounter.     Current Outpatient Medications on File Prior to Encounter   Medication Sig Dispense Refill    ALPRAZolam (XANAX) 0.5 MG tablet Take 0.5 mg by mouth daily as needed.      atenoloL (TENORMIN) 50 MG tablet Take 1 tablet by mouth once daily.      celecoxib (CELEBREX) 200 MG capsule Take 1 capsule by mouth once daily.      citalopram (CELEXA) 20 MG tablet Take 1 tablet by mouth once daily.      dexAMETHasone (DECADRON) 2 MG tablet Take 2 tablets (4 mg total) by mouth daily with breakfast. for 10 days 20 tablet 0    fexofenadine  (ALLEGRA) 180 MG tablet Take 1 tablet by mouth once daily.      fluticasone propionate (FLONASE) 50 mcg/actuation nasal spray 1 spray by Each Nostril route once daily.      hydroCHLOROthiazide (HYDRODIURIL) 25 MG tablet Take 0.5 tablets by mouth once daily.      levETIRAcetam (KEPPRA) 500 MG Tab Take 1 tablet (500 mg total) by mouth 2 (two) times daily. 60 tablet 0    levothyroxine (SYNTHROID) 75 MCG tablet Take 1 tablet by mouth once daily.      pantoprazole (PROTONIX) 40 MG tablet Take 1 tablet by mouth once daily.        Allergies: Pregabalin, Atorvastatin, Dexlansoprazole, Ezetimibe, Gabapentin, Onabotulinumtoxina, Oxybutynin, Rosuvastatin, Sertraline, and Meperidine    No family history on file.  Social History     Tobacco Use    Smoking status: Never    Smokeless tobacco: Never     Review of Systems  Constitutional:  Negative for chills, diaphoresis, fatigue and fever.   HENT:  Negative for congestion, rhinorrhea and sore throat.    Eyes:  Negative for visual disturbance.   Respiratory:  Negative for cough, chest tightness and shortness of breath.    Cardiovascular:  Negative for chest pain.   Gastrointestinal:  Negative for abdominal pain, blood in stool, constipation, diarrhea and vomiting.   Genitourinary:  Negative for dysuria, hematuria and urgency.   Musculoskeletal:  Negative for back pain.   Skin:  Negative for rash.   Neurological:  Negative for speech difficulty. Negative for seizures and syncope.   Hematological:  Does not bruise/bleed easily.   Psychiatric/Behavioral:  Negative for agitation and hallucinations.    Objective:     Vitals:    Temp: 97.4 °F (36.3 °C)  Pulse: 66  BP: (!) 147/63  MAP (mmHg): 91  Resp: 20  SpO2: 96 %  O2 Device (Oxygen Therapy): room air    Temp  Min: 97.4 °F (36.3 °C)  Max: 97.4 °F (36.3 °C)  Pulse  Min: 61  Max: 68  BP  Min: 140/68  Max: 187/83  MAP (mmHg)  Min: 91  Max: 110  Resp  Min: 15  Max: 20  SpO2  Min: 95 %  Max: 99 %    No intake/output data recorded.            Physical Exam  --sedation: none  --GCS: [id number]  --Mental Status: A&O x3 & following commands   --CN II-XII grossly intact.   --PERRL  --brainstem: intact  --Motor: moves all extremities without focality  --sensory: unremarkable  --Reflexes: not tested  --Gait: deferred    Today I personally reviewed pertinent medications, lines/drains/airways, imaging, cardiology results, laboratory results, microbiology results,      Assessment/Plan:     Neuro  Brain mass  Candy Oreilly is a 87 y.o. female with  has a past medical history of Anxiety disorder, unspecified, Coronary artery disease, GERD (gastroesophageal reflux disease), and Thyroid disease. presenting with episodes of word finding difficulty, lower extremity tingling & presyncope x 1 week, medial temporal lobe with no significant mass effect or hemorrhage. Vitals stable, breathing stable, GCS 15, no neuro deficits at the time of my evaluation.    - Given the episodic nature of the symptoms & the location of the mass, recommend EEG & agree with keppra  - MRI done as mentioned above  - No indications for NCC admit at this time  - Further management per neurosurgery & primary team    Will sign off. Please contact us with any questions or concerns or any change requiring NCC evaluation         Activity Orders          None        No Order    Deion Dahl MD  Neurocritical Care  [facility] - Emergency Dept

## 2022-11-24 NOTE — ASSESSMENT & PLAN NOTE
Candy Oreilly is a 87 y.o. female with PMH including CAD, GERD, thyroid disease and arthritis who presents with 1 week of disorientation and word finding difficulties. 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    --Rec admit to Neuro-ICU   -q1h neurochecks in ICU  --All labs and diagnostics reviewed  --Follow-up MRI w/wo contrast w/ [facility]      -CT chest/abdo/pelvis if suspected mets  --SBP <160 (cardene ggt; hydralazine & labetalol PRN; transition to home meds when appropriate)  --Keppra 500 BID  --Dex 4q6 x7d  --Follow-up full pre-op labs (CBC/CMP/PT-INR/PTT/T&S)  --PUD PPx while steroid treatment ongoing  --Continue to monitor clinically, notify NSGY immediately with any changes in neuro status    Dispo: per NCC

## 2022-11-24 NOTE — HPI
Candy Oreilly is a 87 y.o. female with PMH including CAD, GERD, thyroid disease and arthritis who presents with 1 week of disorientation and word finding difficulties. She has occasional episodes of wanting to pass out with associated LLE tingling. Patient went to OSH ED on Monday where they did a CTH and found a brain mass. She presents to the ED at [facility] [place] [place] today to establish care. Patient started taking the ED prescribed keppra and decadron today. 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

## 2022-11-24 NOTE — HPI
Candy Oreilly is a 87 y.o. female with PMH  has a past medical history of Anxiety disorder, unspecified, Coronary artery disease, GERD (gastroesophageal reflux disease), arthritis and Thyroid disease. Who presents with a temporal brain mass.  Patient has been having episodes of word finding difficulty & presyncope, associated LLE tingling for about a week, she also noticed generalized weakness and fatigue during this time. She went to OSH ED on Monday where a CTH was done and found to have a brain mass. She went home then presented to the ED at [facility] [place] today to get evaluated for same complaints with neuro services.   Patient started taking the ED prescribed keppra and decadron today. NSGY consulted for CTH showing L temporal hemorrhagic brain mass. MRI was done and showed previously noted mass, without major mass effects.

## 2022-11-24 NOTE — CONSULTS
"[facility] - Neurosurgery ([facility])  Hematology/Oncology  Consult Note    Patient Name: Candy Oreilly  MRN: [id number]  Admission Date: 11/23/2022  Hospital Length of Stay: 0 days  Code Status: Full Code   Attending Provider: Briana Hui MD  Consulting Provider: Oneyda Baltazar MD  Primary Care Physician: Chata Hurley MD  Principal Problem:Brain mass    Inpatient consult to Hematology/Oncology  Consult performed by: Oneyda Baltazar MD  Consult ordered by: Santos Lan MD        Subjective:     HPI:  Ms. Candy Oreilly is an 87 year old woman with CAD, hypothyroidism who presented as a transfer for temporal brain mass. She initially presented with difficulty with word finding and mild disorientation. She has associated headaches and nausea. She had a MRI brain on 11/23 that showed enhancing mass in the medial temporal lobe. When she was admitted, Neurosurgery was consulted. She was started on dexamethasone and keppra. She is a never smoker. Regarding family history, she says that she had a father with lung, prostate, colon cancer, a mother with thyroid cancer, a sister with breast cancer and an uncle with "multiple" cancers.    Medical Oncology was consulted for brain mass.      Oncology Treatment Plan:   [No matching plan found]    Medications:  Continuous Infusions:   sodium chloride 0.9% 50 mL/hr at 11/24/22 1425     Scheduled Meds:   atenoloL  50 mg Oral Daily    celecoxib  200 mg Oral Daily    cetirizine  5 mg Oral Daily    dexAMETHasone  4 mg Intravenous Q6H    levetiracetam IV  500 mg Intravenous Q12H    levothyroxine  75 mcg Oral Daily    pantoprazole  40 mg Oral BID     PRN Meds:acetaminophen, albuterol-ipratropium, ALPRAZolam, dextrose 10%, dextrose 10%, glucagon (human recombinant), glucose, glucose, influenza 65up-adj, melatonin, naloxone, ondansetron, polyethylene glycol, prochlorperazine, simethicone, sodium chloride 0.9%     Review of patient's allergies indicates:   Allergen " Reactions    Pregabalin Swelling    Atorvastatin Other (See Comments)     Muscle weakness      Dexlansoprazole Other (See Comments)     Other reaction(s): HAND SPASMS  Muscle weakness      Ezetimibe Other (See Comments)     Other reaction(s): MUSCLE PAIN  Muscle pain      Gabapentin Other (See Comments)     Other reaction(s): MAKES HER DIZZY  Dizziness      Onabotulinumtoxina      Medical botox    Oxybutynin Other (See Comments)     Other reaction(s): CANT PEE  Stopped urine flow      Rosuvastatin Other (See Comments)     Leg muscle pain      Sertraline Other (See Comments)     Shaking of hands      Meperidine Nausea Only        Past Medical History:   Diagnosis Date    Anxiety disorder, unspecified     Coronary artery disease     GERD (gastroesophageal reflux disease)     Thyroid disease      Past Surgical History:   Procedure Laterality Date    APPENDECTOMY      CHOLECYSTECTOMY       Family History       Problem Relation (Age of Onset)    Breast cancer Sister    Cancer Brother    Colon cancer Father    Lung cancer Father    Thyroid cancer Mother          Tobacco Use    Smoking status: Never    Smokeless tobacco: Never   Substance and Sexual Activity    Alcohol use: Not on file    Drug use: Not on file    Sexual activity: Not on file       Review of Systems   Constitutional:  Positive for activity change. Negative for appetite change, chills and fever.   HENT:  Negative for congestion, hearing loss and rhinorrhea.    Eyes:  Negative for discharge, itching and visual disturbance.   Respiratory:  Negative for apnea, cough and shortness of breath.    Cardiovascular:  Negative for chest pain, palpitations and leg swelling.   Gastrointestinal:  Positive for nausea. Negative for abdominal distention, abdominal pain, constipation and vomiting.   Endocrine: Negative for cold intolerance and heat intolerance.   Genitourinary:  Negative for dysuria and hematuria.   Musculoskeletal:  Negative for back  pain, neck pain and neck stiffness.   Skin:  Negative for rash and wound.   Neurological:  Positive for speech difficulty and headaches. Negative for dizziness and seizures.   Psychiatric/Behavioral:  Negative for agitation, confusion and suicidal ideas.    Objective:     Vital Signs (Most Recent):  Temp: 98.4 °F (36.9 °C) (11/24/22 1542)  Pulse: 73 (11/24/22 1542)  Resp: 16 (11/24/22 1542)  BP: (!) 164/77 (11/24/22 1542)  SpO2: 98 % (11/24/22 1542)   Vital Signs (24h Range):  Temp:  [97.6 °F (36.4 °C)-98.4 °F (36.9 °C)] 98.4 °F (36.9 °C)  Pulse:  [61-84] 73  Resp:  [16-20] 16  SpO2:  [94 %-98 %] 98 %  BP: (134-164)/(61-81) 164/77     Weight: 86.2 kg (189 lb 15.9 oz)  Body mass index is 33.66 kg/m².  Body surface area is 1.96 meters squared.      Intake/Output Summary (Last 24 hours) at 11/24/2022 1739  Last data filed at 11/24/2022 1200  Gross per 24 hour   Intake 480 ml   Output --   Net 480 ml       Physical Exam  Vitals reviewed.   Constitutional:       General: She is not in acute distress.     Appearance: She is well-developed.   HENT:      Head: Normocephalic and atraumatic.      Nose: Nose normal. No rhinorrhea.   Eyes:      General: No scleral icterus.        Right eye: No discharge.         Left eye: No discharge.      Extraocular Movements: Extraocular movements intact.   Neck:      Vascular: No JVD.   Cardiovascular:      Rate and Rhythm: Normal rate and regular rhythm.   Pulmonary:      Effort: Pulmonary effort is normal. No respiratory distress.      Breath sounds: No wheezing.   Abdominal:      General: There is no distension.      Palpations: Abdomen is soft.      Tenderness: There is no abdominal tenderness.   Musculoskeletal:         General: No deformity. Normal range of motion.      Cervical back: Normal range of motion and neck supple.   Skin:     General: Skin is warm and dry.   Neurological:      General: No focal deficit present.      Mental Status: She is alert and oriented to person, place,  and time.      Comments: 5/5 strength BUE and BLE   Psychiatric:         Mood and Affect: Mood normal.         Behavior: Behavior normal.       Significant Labs:   CBC:   Recent Labs   Lab 11/23/22  1810 11/24/22  0346   WBC 11.60 11.11   HGB 15.9 14.8   HCT 47.1 44.5    264    and CMP:   Recent Labs   Lab 11/23/22  1810 11/24/22  0346   * 133*   K 3.8 3.6   CL 96 98   CO2 25 24   * 274*   BUN 20 16   CREATININE 1.2 0.8   CALCIUM 9.7 9.3   PROT 6.9 6.3   ALBUMIN 3.9 3.6   BILITOT 1.0 1.2*   ALKPHOS 85 81   AST 20 16   ALT 22 17   ANIONGAP 13 11       Diagnostic Results:  MRI brain (11/23/2022):  Enhancing mass in the medial temporal lobe possibly interventricular.  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     Minimal surrounding vasogenic type edema with no mass effect.     No restricted diffusion to suggest acute infarct.    Assessment/Plan:     * Brain mass  Patient is an 87 year old presenting with word difficulty and mild disorientation, found to have an enhancing mass in the medial temporal lobe.    Patient will need tissue biopsy to establish diagnosis.  Agree with CT chest/abdomen/pelvis. Further recs pending imaging.  Steroids per NGSY      Patient to be discussed with Dr. Baker tomorrow.    Thank you for your consult. I will follow-up with patient. Please contact us if you have any additional questions.    Oneyda Baltazar MD  Hematology/Oncology  [facility] - Neurosurgery [facility])

## 2022-11-24 NOTE — ED TRIAGE NOTES
Abnormal Ct Scan (Reports of Brain Mass on head CT. Was suppose to be transferred here on 11/21 but daughter stated would bring her here herself)

## 2022-11-24 NOTE — ED PROVIDER NOTES
Encounter Date: 11/23/2022       History     Chief Complaint   Patient presents with    Abnormal Ct Scan     Reports of Brain Mass on head CT. Was suppose to be transferred here on 11/21 but daughter stated would bring her here herself     Ms. Oreilly is an 87-year-old female with  past medical history of coronary artery disease,GERD and thyroid disease who presents to the emergency department to be evaluated by Neurosurgery after she was found to have an enhancing left medial temporal lobe mass with surrounding edema and concern for malignancy.  Patient's daughter states that she has had intermittent word-finding difficulty for over a week as well as numbness and tingling on her left side and that was what made them go to the ED.       Review of patient's allergies indicates:   Allergen Reactions    Pregabalin Swelling    Atorvastatin Other (See Comments)     Muscle weakness      Dexlansoprazole Other (See Comments)     Other reaction(s): HAND SPASMS  Muscle weakness      Ezetimibe Other (See Comments)     Other reaction(s): MUSCLE PAIN  Muscle pain      Gabapentin Other (See Comments)     Other reaction(s): MAKES HER DIZZY  Dizziness      Onabotulinumtoxina      Medical botox    Oxybutynin Other (See Comments)     Other reaction(s): CANT PEE  Stopped urine flow      Rosuvastatin Other (See Comments)     Leg muscle pain      Sertraline Other (See Comments)     Shaking of hands      Meperidine Nausea Only     Past Medical History:   Diagnosis Date    Anxiety disorder, unspecified     Coronary artery disease     GERD (gastroesophageal reflux disease)     Thyroid disease      Past Surgical History:   Procedure Laterality Date    APPENDECTOMY      CHOLECYSTECTOMY       Family History   Problem Relation Age of Onset    Thyroid cancer Mother     Lung cancer Father     Colon cancer Father     Breast cancer Sister     Cancer Brother      Social History     Tobacco Use    Smoking status: Never    Smokeless tobacco: Never      Review of Systems   Constitutional:  Negative for chills, diaphoresis, fatigue and fever.   HENT:  Negative for congestion, rhinorrhea and sore throat.    Eyes:  Negative for visual disturbance.   Respiratory:  Negative for cough, chest tightness and shortness of breath.    Cardiovascular:  Negative for chest pain.   Gastrointestinal:  Negative for abdominal pain, blood in stool, constipation, diarrhea and vomiting.   Genitourinary:  Negative for dysuria, hematuria and urgency.   Musculoskeletal:  Negative for back pain.   Skin:  Negative for rash.   Neurological:  Positive for speech difficulty. Negative for seizures and syncope.   Hematological:  Does not bruise/bleed easily.   Psychiatric/Behavioral:  Negative for agitation and hallucinations.      Physical Exam     Initial Vitals [11/23/22 1706]   BP Pulse Resp Temp SpO2   (!) 187/83 68 15 97.4 °F (36.3 °C) 99 %      MAP       --         Physical Exam     Nursing note and vitals reviewed.  Constitutional: Patient appears well-developed and well-nourished. No distress. AxOx3, NAD, well nourished, appears stated age  HENT:   Head: Normocephalic and atraumatic.   Eyes: Conjunctivae and EOM are normal. Pupils are equal, round, and reactive to light. no scleral icterus, no periorbital edema or ecchymosis  Neck: Neck supple. no stridor, no masses, no drooling or voice changes  Normal range of motion.  Cardiovascular: Normal rate, regular rhythm, normal heart sounds and intact distal pulses. no m/r/g  Pulmonary/Chest: Breath sounds normal. CTAB, no wheezes, rales or rhonchi, no increased work of breathing  Abdominal: Abdomen is soft. Patient exhibits no distension. There is no abdominal tenderness. no organomegaly, no CVAT  Musculoskeletal:      Cervical back: Normal range of motion and neck supple.   Neurological: Patient is alert and oriented to person, place, and time. No cranial nerve deficit. Gait normal. GCS score is 15. Moving all extremities, gait intact,  face grossly symmetric. Patient states decreased sensation to left face.   Skin: Skin is warm and dry.  Ext: no edema, no lesions, rashes, or deformity  Psych: Normal mood/affect,cooperative, well groomed, makes good eye contact        ED Course   Procedures  Labs Reviewed   CBC W/ AUTO DIFFERENTIAL - Abnormal; Notable for the following components:       Result Value    RBC 5.45 (*)     Immature Granulocytes 1.6 (*)     Gran # (ANC) 9.5 (*)     Immature Grans (Abs) 0.19 (*)     Gran % 82.2 (*)     Lymph % 13.2 (*)     Mono % 2.2 (*)     All other components within normal limits   COMPREHENSIVE METABOLIC PANEL - Abnormal; Notable for the following components:    Sodium 134 (*)     Glucose 358 (*)     eGFR 43.8 (*)     All other components within normal limits   HIV 1 / 2 ANTIBODY    Narrative:     Release to patient->Immediate   HEPATITIS C ANTIBODY    Narrative:     Release to patient->Immediate   MAGNESIUM   PROTIME-INR   APTT   TYPE & SCREEN     EKG Readings: (Independently Interpreted)   Initial Reading: No STEMI. Previous EKG: Compared with most recent EKG Rhythm: Normal Sinus Rhythm. Heart Rate: 66.   ECG Results              EKG 12-lead (Final result)  Result time 11/24/22 10:32:11      Final result by Interface, Lab [facility] (11/24/22 10:32:11)                   Narrative:    Test Reason : G93.89,    Vent. Rate : 066 BPM     Atrial Rate : 066 BPM     P-R Int : 154 ms          QRS Dur : 076 ms      QT Int : 396 ms       P-R-T Axes : 047 063 062 degrees     QTc Int : 415 ms    Normal sinus rhythm  Normal ECG  When compared with ECG of 21-NOV-2022 12:39,  No significant change was found  Confirmed by LISA WAN, HOMEYAR ([id number]) on 11/24/2022 10:32:02 AM    Referred By: [email or web address]   SELF           Confirmed By:MOISE GONZALEZ MD                                  Imaging Results               CT Head Without Contrast (Final result)  Result time 11/24/22 00:00:24      Final result by Aniket Del Cid MD (11/24/22  00:00:24)                   Impression:      1.  Stable-appearing mass in the left temporal lobe measuring approximately 2.1 x 1.8 cm with surrounding edema.  No additional mass seen.  No hemorrhage.  See MRI brain 11/23/2022 for additional characterization.    2.  Obliteration of the temporal horn of the left lateral ventricle with suspected mild left-sided uncal herniation.  Continued short-term follow-up is suggested.    This report was flagged in Epic as abnormal.    Electronically signed by resident: Juan Velasquez  Date:    11/23/2022  Time:    23:31    Electronically signed by: Aniket Del Cid MD  Date:    11/24/2022  Time:    00:00               Narrative:    EXAMINATION:  CT HEAD WITHOUT CONTRAST    CLINICAL HISTORY:  Mental status change, unknown cause;    TECHNIQUE:  Low dose axial CT images obtained throughout the head without the use of intravenous contrast.  Axial, sagittal and coronal reconstructions were performed.    COMPARISON:  MRI brain 11/23/2022, CT head 11/21/2022, 11/21/2022, 05/12/2017, 06/13/2012    FINDINGS:  INTRACRANIAL COMPARTMENT:    Stable-appearing mass in the left temporal lobe measuring approximately 2.1 x 1.8 cm with surrounding edema.  No additional mass seen.  No hemorrhage.    The temporal horn of the left lateral ventricle is effaced.  The may be some component of underlying uncal herniation.    Mild patchy hypoattenuation in the supratentorial white matter, nonspecific but most likely reflecting chronic microvascular ischemic changes. No parenchymal hemorrhage, edema or major vascular distribution infarct.    No extra-axial blood or fluid collections.    SKULL/EXTRACRANIAL CONTENTS (limited evaluation):    No fracture. Mastoid air cells and paranasal sinuses are essentially clear.                                       MRI Brain W WO Contrast (Final result)  Result time 11/23/22 23:38:30   Procedure changed from MRI Brain (Tumor with Perfusion) W W/O Contrast (XPD)     Final  result by Nehemias Jimenez MD (11/23/22 23:38:30)                   Impression:      Enhancing mass in the medial temporal lobe possibly interventricular.  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    Minimal surrounding vasogenic type edema with no mass effect.    No restricted diffusion to suggest acute infarct.      Electronically signed by: Nehemias Jimenez  Date:    11/23/2022  Time:    23:38               Narrative:    EXAMINATION:  MRI BRAIN W WO CONTRAST    CLINICAL HISTORY:  brain mass;.    TECHNIQUE:  Multiplanar multisequence MR imaging of the brain was performed before and after the administration of 9 mL Gadavist intravenous contrast.    COMPARISON:  Multiple CTs of the brain, most recent 11/21/2022 at 14:44 hours with contrast    FINDINGS:  Intracranial compartment:    Ventricles and sulci are normal in size for age without evidence of hydrocephalus. No extra-axial blood or fluid collections.    Area of low density in the medial left temporal lobe demonstrates no restricted diffusion.  Minimal gliotic signal changes noted in the region of low density.  The nodular enhancing focus demonstrates enhancement on gadolinium-enhanced sequences measuring approximately 15 by 16 x 21 mm.  There may be a nonenhancing medial component measuring 10 mm.  The lesion appears to have enhancing tail and may be intraventricular in the temporal horn tip.  No additional mass lesion, acute hemorrhage, edema or acute infarct. No abnormal enhancement.    Normal vascular flow voids are preserved.    Skull/extracranial contents (limited evaluation): Bone marrow signal intensity is normal.                                    X-Rays:   Independently Interpreted Readings:   Head CT: No skull fracture.  No hemorrhage. There is a mass in the left temporal lobe with vasogenic edema, no hemorrhage or skull fracture   Medications   levETIRAcetam injection 500 mg (500 mg Intravenous Given 11/24/22 0837)    dexAMETHasone injection 4 mg (4 mg Intravenous Given 11/24/22 1143)   ALPRAZolam tablet 0.5 mg (has no administration in time range)   atenoloL tablet 50 mg (50 mg Oral Given 11/24/22 0837)   celecoxib capsule 200 mg (200 mg Oral Given 11/24/22 0835)   cetirizine tablet 5 mg (5 mg Oral Given 11/24/22 0836)   levothyroxine tablet 75 mcg (75 mcg Oral Given 11/24/22 0837)   sodium chloride 0.9% flush 10 mL (has no administration in time range)   albuterol-ipratropium 2.5 mg-0.5 mg/3 mL nebulizer solution 3 mL (has no administration in time range)   melatonin tablet 6 mg (has no administration in time range)   ondansetron disintegrating tablet 4 mg (has no administration in time range)   prochlorperazine injection Soln 5 mg (has no administration in time range)   polyethylene glycol packet 17 g (has no administration in time range)   acetaminophen tablet 650 mg (has no administration in time range)   simethicone chewable tablet 80 mg (has no administration in time range)   naloxone 0.4 mg/mL injection 0.02 mg (has no administration in time range)   glucose chewable tablet 16 g (has no administration in time range)   glucose chewable tablet 24 g (has no administration in time range)   glucagon (human recombinant) injection 1 mg (has no administration in time range)   dextrose 10% bolus 125 mL (has no administration in time range)   dextrose 10% bolus 250 mL (has no administration in time range)   pantoprazole EC tablet 40 mg (has no administration in time range)   0.9%  NaCl infusion (has no administration in time range)   LORazepam injection 1 mg (1 mg Intravenous Given 11/23/22 2243)   gadobutroL (GADAVIST) injection 9 mL (9 mLs Intravenous Given 11/23/22 2309)     Medical Decision Making:   History:   Old Medical Records: I decided to obtain old medical records.  Old Records Summarized: records from previous admission(s).       <> Summary of Records: Medical records from outside hospital showing a medial temporal  lobe mass with surrounding vasogenic edema.  Initial Assessment:   Ms. Oreilly is an 87-year-old female with  past medical history of coronary artery disease,GERD and thyroid disease who presents to the emergency department to be evaluated by Neurosurgery after she was found to have an enhancing left medial temporal lobe mass with surrounding edema and concern for malignanc  Differential Diagnosis:   Intracranial mass  SAH  Stroke/severe  Subdural hemorrhage    Independently Interpreted Test(s):   I have ordered and independently interpreted X-rays - see prior notes.  I have ordered and independently interpreted EKG Reading(s) - see prior notes  Clinical Tests:   Lab Tests: Ordered and Reviewed  Radiological Study: Ordered and Reviewed  Medical Tests: Ordered and Reviewed  ED Management:  Patient was examined, Patient's imaging from outside facility was reviewed and was significant for an enhancing mass in the left medial temporal lobe measures 1.5 x 1.9 cm in size, with surrounding edema.  Discussion was had with neurosurgery and patient was evaluated by them  Discussion was had with the Neuro ICU who also evaluated the patient but they stated that they would like further imaging before determining if to admit the patient.  MRI of the brain was ordered as well as a CT scan.  Patient was signed out to incoming team pending her repeat imaging.   Other:   I have discussed this case with another health care provider.       <> Summary of the Discussion: Neurosurgery and neurocritical care          Attending Attestation:   Physician Attestation Statement for Resident:  As the supervising MD   Physician Attestation Statement: I have personally seen and examined this patient.   I agree with the above history.  -:   As the supervising MD I agree with the above PE.     As the supervising MD I agree with the above treatment, course, plan, and disposition.                    I have reviewed and concur with the resident's history,  physical, assessment, and plan.  I have personally interviewed and examined the patient at bedside.   I did supervise any and all procedures and was present for any critical portion, and was always immediately available for help and as a resource.     The above history physical, review of symptoms, HPI and physical exam reflect my independent interpretation and evaluation.    Complexity: High - level 5    Final diagnoses:  [G93.89] Brain mass     Nahum Echevarria DO, FAAEM  Emergency Staff Physician   Dept of Emergency Medicine   [facility]  Spectralink: [id number]        Disclaimer: This note has been generated using voice-recognition software. There may be typographical errors that have been missed during proof-reading.            ED Course as of 11/24/22 1249   Wed Nov 23, 2022   1838 Discussion had with neurosurgery who will come see patient [OO]   2038 Follow-up discussion had with neurosurgery who stated that they were held up by a procedure but will some see patient   [OO]   2121 Follow-up discussion had with neurosurgery who now feel that patient should go to neuro ICU [OO]   2121 Discussion had with the neuro ICU who will come see patient [OO]      ED Course User Index  [OO] Ai Adam MD                 Clinical Impression:   Final diagnoses:  [G93.89] Brain mass        ED Disposition Condition    Admit Stable                Ai Adam MD  Resident  11/23/22 [id number]       Nahum Echevarria DO  11/24/22 1252

## 2022-11-24 NOTE — SUBJECTIVE & OBJECTIVE
Past Medical History:   Diagnosis Date    Anxiety disorder, unspecified     Coronary artery disease     GERD (gastroesophageal reflux disease)     Thyroid disease      Past Surgical History:   Procedure Laterality Date    APPENDECTOMY      CHOLECYSTECTOMY        No current facility-administered medications on file prior to encounter.     Current Outpatient Medications on File Prior to Encounter   Medication Sig Dispense Refill    ALPRAZolam (XANAX) 0.5 MG tablet Take 0.5 mg by mouth daily as needed.      atenoloL (TENORMIN) 50 MG tablet Take 1 tablet by mouth once daily.      celecoxib (CELEBREX) 200 MG capsule Take 1 capsule by mouth once daily.      citalopram (CELEXA) 20 MG tablet Take 1 tablet by mouth once daily.      dexAMETHasone (DECADRON) 2 MG tablet Take 2 tablets (4 mg total) by mouth daily with breakfast. for 10 days 20 tablet 0    fexofenadine (ALLEGRA) 180 MG tablet Take 1 tablet by mouth once daily.      fluticasone propionate (FLONASE) 50 mcg/actuation nasal spray 1 spray by Each Nostril route once daily.      hydroCHLOROthiazide (HYDRODIURIL) 25 MG tablet Take 0.5 tablets by mouth once daily.      levETIRAcetam (KEPPRA) 500 MG Tab Take 1 tablet (500 mg total) by mouth 2 (two) times daily. 60 tablet 0    levothyroxine (SYNTHROID) 75 MCG tablet Take 1 tablet by mouth once daily.      pantoprazole (PROTONIX) 40 MG tablet Take 1 tablet by mouth once daily.        Allergies: Pregabalin, Atorvastatin, Dexlansoprazole, Ezetimibe, Gabapentin, Onabotulinumtoxina, Oxybutynin, Rosuvastatin, Sertraline, and Meperidine    No family history on file.  Social History     Tobacco Use    Smoking status: Never    Smokeless tobacco: Never     Review of Systems  Constitutional:  Negative for chills, diaphoresis, fatigue and fever.   HENT:  Negative for congestion, rhinorrhea and sore throat.    Eyes:  Negative for visual disturbance.   Respiratory:  Negative for cough, chest tightness and shortness of breath.     Cardiovascular:  Negative for chest pain.   Gastrointestinal:  Negative for abdominal pain, blood in stool, constipation, diarrhea and vomiting.   Genitourinary:  Negative for dysuria, hematuria and urgency.   Musculoskeletal:  Negative for back pain.   Skin:  Negative for rash.   Neurological:  Negative for speech difficulty. Negative for seizures and syncope.   Hematological:  Does not bruise/bleed easily.   Psychiatric/Behavioral:  Negative for agitation and hallucinations.    Objective:     Vitals:    Temp: 97.4 °F (36.3 °C)  Pulse: 66  BP: (!) 147/63  MAP (mmHg): 91  Resp: 20  SpO2: 96 %  O2 Device (Oxygen Therapy): room air    Temp  Min: 97.4 °F (36.3 °C)  Max: 97.4 °F (36.3 °C)  Pulse  Min: 61  Max: 68  BP  Min: 140/68  Max: 187/83  MAP (mmHg)  Min: 91  Max: 110  Resp  Min: 15  Max: 20  SpO2  Min: 95 %  Max: 99 %    No intake/output data recorded.           Physical Exam  --sedation: none  --GCS: E4V5M6  --Mental Status: A&O x3 & following commands   --CN II-XII grossly intact.   --PERRL  --brainstem: intact  --Motor: moves all extremities without focality  --sensory: unremarkable  --Reflexes: not tested  --Gait: deferred    Today I personally reviewed pertinent medications, lines/drains/airways, imaging, cardiology results, laboratory results, microbiology results,

## 2022-11-24 NOTE — NURSING
Pt. transferred to NPU from ED via stretcher. Pt. on telemonitor transported by hospital transporter.     Neurological: AAxO4; moves all extremities  Pulmonary: RA  Cardiac: refer to VS flowsheets  Gastrointestinal: no complaints  Genitourinary: no complaints  Skin: moisture related sacral dermatitis  IV: 20 G L AC; 20 G R hand  Pain: no complaints    POC discussed with pt. And daughter @ bedside. Bed placed in lowest position and SR x3. Monitoring continued.

## 2022-11-24 NOTE — ASSESSMENT & PLAN NOTE
Candy Oreilly is a 87 y.o. female with  has a past medical history of Anxiety disorder, unspecified, Coronary artery disease, GERD (gastroesophageal reflux disease), and Thyroid disease. presenting with episodes of word finding difficulty, lower extremity tingling & presyncope x 1 week, medial temporal lobe with no significant mass effect or hemorrhage. Vitals stable, breathing stable, GCS 15, no neuro deficits at the time of my evaluation.    - Given the episodic nature of the symptoms, recommend EEG & agree with keppra  - MRI done as mentioned above  - No indications for NCC admit at this time  - Further management per neurosurgery & primary team    Will sign off. Please contact us with any questions or concerns or any change requiring NCC evaluation

## 2022-11-24 NOTE — SUBJECTIVE & OBJECTIVE
(Not in a hospital admission)      Review of patient's allergies indicates:   Allergen Reactions    Pregabalin Swelling    Atorvastatin Other (See Comments)     Muscle weakness      Dexlansoprazole Other (See Comments)     Other reaction(s): HAND SPASMS  Muscle weakness      Ezetimibe Other (See Comments)     Other reaction(s): MUSCLE PAIN  Muscle pain      Gabapentin Other (See Comments)     Other reaction(s): MAKES HER DIZZY  Dizziness      Onabotulinumtoxina      Medical botox    Oxybutynin Other (See Comments)     Other reaction(s): CANT PEE  Stopped urine flow      Rosuvastatin Other (See Comments)     Leg muscle pain      Sertraline Other (See Comments)     Shaking of hands      Meperidine Nausea Only       Past Medical History:   Diagnosis Date    Anxiety disorder, unspecified     Coronary artery disease     GERD (gastroesophageal reflux disease)     Thyroid disease      Past Surgical History:   Procedure Laterality Date    APPENDECTOMY      CHOLECYSTECTOMY       Family History    None       Tobacco Use    Smoking status: Never    Smokeless tobacco: Never   Substance and Sexual Activity    Alcohol use: Not on file    Drug use: Not on file    Sexual activity: Not on file     Review of Systems   Constitutional:  Negative for fever.   HENT:  Negative for voice change.    Eyes:  Negative for pain.   Respiratory:  Negative for shortness of breath.    Cardiovascular:  Negative for chest pain.   Gastrointestinal:  Positive for diarrhea. Negative for abdominal pain.   Endocrine: Negative for polyuria.   Genitourinary:  Negative for difficulty urinating.   Neurological:  Positive for weakness.        Generalized weakness   Psychiatric/Behavioral:  Positive for confusion.    Objective:     Weight: 86.2 kg (190 lb)  There is no height or weight on file to calculate BMI.  Vital Signs (Most Recent):  Temp: 97.4 °F (36.3 °C) (11/23/22 1706)  Pulse: 65 (11/23/22 2201)  Resp: 18 (11/23/22 2201)  BP: (!) 151/70 (11/23/22  2201)  SpO2: 95 % (11/23/22 2201)   Vital Signs (24h Range):  Temp:  [97.4 °F (36.3 °C)] 97.4 °F (36.3 °C)  Pulse:  [61-68] 65  Resp:  [15-18] 18  SpO2:  [95 %-99 %] 95 %  BP: (151-187)/(70-83) 151/70                          Physical Exam  General: Awake, alert, oriented  Head: Non-traumatic, normocephalic  Eyes: Pupils equal, EOMI  Neck: Supple, normal ROM, no tenderness to palpation  CVS: Normal rate and rhythm  Pulm: Symmetric expansion, no respiratory distress  GI: Abdomen nondistended, nontender  MSK: Moves all extremities without restriction, atraumatic  Skin: Dry, intact  Psych: Normal thought content and cognition    Neurosurgery Physical Exam  AOx3, GCS E4V5M6  Occasional word finding difficulties  CNII-XII: Intact on fine exam, PERRL, visual fields grossly intact, EOMI, facial sensation preserved, no facial asymmetry, tongue midline, shoulder shrug equal, no pronator drift  Extremities: 5/5 motor throughout, SILT, coordination intact throughout    Significant Labs:  Recent Labs   Lab 11/23/22  1810   *   *   K 3.8   CL 96   CO2 25   BUN 20   CREATININE 1.2   CALCIUM 9.7   MG 1.8     Recent Labs   Lab 11/23/22  1810   WBC 11.60   HGB 15.9   HCT 47.1        No results for input(s): LABPT, INR, APTT in the last 48 hours.  Microbiology Results (last 7 days)       ** No results found for the last 168 hours. **          All pertinent labs from the last 24 hours have been reviewed.    Significant Diagnostics:  CT: No results found in the last 24 hours.  I have reviewed all pertinent imaging results/findings within the past 24 hours.

## 2022-11-24 NOTE — ED NOTES
Pt care assumed. Report received by Shawna RN. Pt lying in stretcher in low and locked position and side rails raised x2. Call light, pt's belongings, and bedside table within pt's reach. Pt on continuous cardiac monitoring, pulse oximetry, and BP cycling every 30 minutes. Pt in NAD and verbalized no needs at this time. Family member x2 at bedside.

## 2022-11-24 NOTE — PROVIDER PROGRESS NOTES - EMERGENCY DEPT.
Encounter Date: 11/23/2022    ED Physician Progress Notes        Physician Note:   AOC @ 2300. Patient presented w/ intracranial mass. Concern for waxing and waning AMS    Pending studies include CT head w/o contrast, MRI brain    Pending actions include d/w NCC for admission after imaging, per NSGY Recommendation    Likely disposition is admit to NCC    Gianni Coats    3:00 AM  CT/MRI not c/w worsening lesion. D/w NSGY, NCC, and Hospital Medicine. Will plan for admission to Hospital Medicine w/ NSGY following and providing recommendations

## 2022-11-24 NOTE — PROGRESS NOTES
[facility] - Neurosurgery ([facility])  Neurosurgery  Progress Note    Subjective:     History of Present Illness: Candy Oreilly is a 87 y.o. female with PMH including CAD, GERD, thyroid disease and arthritis who presents with 1 week of disorientation and word finding difficulties. She has occasional episodes of wanting to pass out with associated LLE tingling. Patient went to OSH ED on Monday where they did a CTH and found a brain mass. [place] [facility] [place]. Patient started taking the ED prescribed keppra and decadron today. 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      Post-Op Info:  * No surgery found *         Interval History: 11/24: AFVSS. MRI completed showing enhancing L temporal mass with some edema. Met workup per primary team.    Medications:   atenoloL  50 mg Oral Daily    celecoxib  200 mg Oral Daily    cetirizine  5 mg Oral Daily    dexAMETHasone  4 mg Intravenous Q6H    hydroCHLOROthiazide  12.5 mg Oral Daily    levetiracetam IV  500 mg Intravenous Q12H    levothyroxine  75 mcg Oral Daily    pantoprazole  40 mg Oral Daily     PRN: acetaminophen, albuterol-ipratropium, ALPRAZolam, dextrose 10%, dextrose 10%, glucagon (human recombinant), glucose, glucose, melatonin, naloxone, ondansetron, polyethylene glycol, prochlorperazine, simethicone, sodium chloride 0.9%    Objective:     Weight: 86.2 kg (190 lb)  There is no height or weight on file to calculate BMI.  Vital Signs (Most Recent):  Temp: 97.4 °F (36.3 °C) (11/23/22 1706)  Pulse: 66 (11/24/22 0001)  Resp: 20 (11/24/22 0001)  BP: (!) 147/63 (11/24/22 0001)  SpO2: 96 % (11/24/22 0001)   Vital Signs (24h Range):  Temp:  [97.4 °F (36.3 °C)] 97.4 °F (36.3 °C)  Pulse:  [61-68] 66  Resp:  [15-20] 20  SpO2:  [95 %-99 %] 96 %  BP: (140-187)/(63-83) 147/63                          Physical Exam  General: Awake, alert, oriented  Head: Non-traumatic, normocephalic  Eyes: Pupils equal,  EOMI  Neck: Supple, normal ROM, no tenderness to palpation  CVS: Normal rate and rhythm  Pulm: Symmetric expansion, no respiratory distress  GI: Abdomen nondistended, nontender  MSK: Moves all extremities without restriction, atraumatic  Skin: Dry, intact  Psych: Normal thought content and cognition    Neurosurgery Physical Exam  AOx3, GCS E4V5M6  Occasional word finding difficulties  CNII-XII: Intact on fine exam, PERRL, visual fields grossly intact, EOMI, facial sensation preserved, no facial asymmetry, tongue midline, shoulder shrug equal, no pronator drift  Extremities: 5/5 motor throughout, SILT, coordination intact throughout    Significant Labs:  Recent Labs   Lab 11/23/22  1810   *   *   K 3.8   CL 96   CO2 25   BUN 20   CREATININE 1.2   CALCIUM 9.7   MG 1.8       Recent Labs   Lab 11/23/22  1810   WBC 11.60   HGB 15.9   HCT 47.1          No results for input(s): LABPT, INR, APTT in the last 48 hours.  Microbiology Results (last 7 days)       ** No results found for the last 168 hours. **          All pertinent labs from the last 24 hours have been reviewed.    Significant Diagnostics:  CT: No results found in the last 24 hours.  I have reviewed all pertinent imaging results/findings within the past 24 hours.    Assessment/Plan:     * Brain mass  Candy Oreilly is a 87 y.o. female with PMH including CAD, GERD, thyroid disease and arthritis who presents with 1 week of disorientation and word finding difficulties. 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    --Admit to  for metastatic workup   -q4h neurochecks on floor  --All labs and diagnostics reviewed  --CTH 11/23: grossly stable mass compared to 11/21  --MRI 11/23: enhancing L temporal mass with some surrounding edema, no blood on SWI  --CT chest/abdo/pelvis if suspected mets  --SBP <160  --Keppra 500 BID  --Dex 4q6 x7d  --PUD PPx while steroid treatment ongoing  --Continue to monitor clinically, notify  NSGY immediately with any changes in neuro status    Dispo: per primary          Aliyah Dueñas MD  Neurosurgery  [facility] - Neurosurgery ([facility])

## 2022-11-24 NOTE — ASSESSMENT & PLAN NOTE
"NSGY consulted per recs,   "--Admit to  for metastatic workup         -q4h neurochecks on floor  --All labs and diagnostics reviewed  --Follow-up MRI w/wo contrast w/ [facility]         -CT chest/abdo/pelvis if suspected mets  --SBP <160 (cardene ggt; hydralazine & labetalol PRN; transition to home meds when appropriate)  --Keppra 500 BID  --Dex 4q6 x7d  --Follow-up full pre-op labs (CBC/CMP/PT-INR/PTT/T&S)  --PUD PPx while steroid treatment ongoing  --Continue to monitor clinically, notify NSGY immediately with any changes in neuro status   "  Will consult Heme/onc as well  "

## 2022-11-24 NOTE — HPI
88 yo female with CAD, GERD, thyroid disease, arthritis, anxiety presenting to outside hospital for difficulty with word finding and mild disorientation [place] [facility] [place] of [place] brain [place]. She has been having syncopal episodes as well during this past week as well during the word finding difficulties. She had some LLE tingling as well. This was not getting any better so her family brought her in for evaluation to outside ED. CT showing brain mass, transferred here for DEV vela. She denies any long history of smoking, she denies any weight loss fevers, no abnormal vaginal bleeding. She does occasionally get hemorrhoidal bleeding. She does report some diarrhea that last a while a few weeks ago, but has been clearing up. She denies any melena or BRBPR. She did have normal screening exams but those ended 2/2 age.     In ED, DEV and RICKEY vela'shalom, approved for floor admission to medicine. Patient started on keppra and steroids. Medicine called for possible cancer workup.

## 2022-11-24 NOTE — PLAN OF CARE
Pt was seen and examined.   Pt is neurologically intact at this moment.   Will order CT chest/abdomen/pelvis with IV contrast to rule in/out metastatic disease.  Will order IV fluids running at 50 cc/hr to hydrate the pt before and after IV contrast.   Will monitor closely.

## 2022-11-24 NOTE — CONSULTS
[facility] - Emergency Dept  Neurosurgery  Consult Note    Inpatient consult to Neurosurgery  Consult performed by: Aliyah Dueñas MD  Consult ordered by: Nahum Echevarria DO      Subjective:     Chief Complaint/Reason for Admission: brain mass    History of Present Illness: Candy Oreilly is a 87 y.o. female with PMH including CAD, GERD, thyroid disease and arthritis who presents with 1 week of disorientation and word finding difficulties. She has occasional episodes of wanting to pass out with associated LLE tingling. Patient went to OSH ED on Monday where they did a CTH and found a brain mass. She presents to the ED at [facility] today to establish care. Patient started taking the ED prescribed keppra and decadron today. 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      (Not in a hospital admission)      Review of patient's allergies indicates:   Allergen Reactions    Pregabalin Swelling    Atorvastatin Other (See Comments)     Muscle weakness      Dexlansoprazole Other (See Comments)     Other reaction(s): HAND SPASMS  Muscle weakness      Ezetimibe Other (See Comments)     Other reaction(s): MUSCLE PAIN  Muscle pain      Gabapentin Other (See Comments)     Other reaction(s): MAKES HER DIZZY  Dizziness      Onabotulinumtoxina      Medical botox    Oxybutynin Other (See Comments)     Other reaction(s): CANT PEE  Stopped urine flow      Rosuvastatin Other (See Comments)     Leg muscle pain      Sertraline Other (See Comments)     Shaking of hands      Meperidine Nausea Only       Past Medical History:   Diagnosis Date    Anxiety disorder, unspecified     Coronary artery disease     GERD (gastroesophageal reflux disease)     Thyroid disease      Past Surgical History:   Procedure Laterality Date    APPENDECTOMY      CHOLECYSTECTOMY       Family History    None       Tobacco Use    Smoking status: Never    Smokeless tobacco: Never   Substance and Sexual Activity    Alcohol use: Not on file     Drug use: Not on file    Sexual activity: Not on file     Review of Systems   Constitutional:  Negative for fever.   HENT:  Negative for voice change.    Eyes:  Negative for pain.   Respiratory:  Negative for shortness of breath.    Cardiovascular:  Negative for chest pain.   Gastrointestinal:  Positive for diarrhea. Negative for abdominal pain.   Endocrine: Negative for polyuria.   Genitourinary:  Negative for difficulty urinating.   Neurological:  Positive for weakness.        Generalized weakness   Psychiatric/Behavioral:  Positive for confusion.    Objective:     Weight: 86.2 kg (190 lb)  There is no height or weight on file to calculate BMI.  Vital Signs (Most Recent):  Temp: 97.4 °F (36.3 °C) (11/23/22 1706)  Pulse: 66 (11/24/22 0001)  Resp: 20 (11/24/22 0001)  BP: (!) 147/63 (11/24/22 0001)  SpO2: 96 % (11/24/22 0001)   Vital Signs (24h Range):  Temp:  [97.4 °F (36.3 °C)] 97.4 °F (36.3 °C)  Pulse:  [61-68] 66  Resp:  [15-20] 20  SpO2:  [95 %-99 %] 96 %  BP: (140-187)/(63-83) 147/63                          Physical Exam  General: Awake, alert, oriented  Head: Non-traumatic, normocephalic  Eyes: Pupils equal, EOMI  Neck: Supple, normal ROM, no tenderness to palpation  CVS: Normal rate and rhythm  Pulm: Symmetric expansion, no respiratory distress  GI: Abdomen nondistended, nontender  MSK: Moves all extremities without restriction, atraumatic  Skin: Dry, intact  Psych: Normal thought content and cognition    Neurosurgery Physical Exam  AOx3, GCS E4V5M6  Occasional word finding difficulties  CNII-XII: Intact on fine exam, PERRL, visual fields grossly intact, EOMI, facial sensation preserved, no facial asymmetry, tongue midline, shoulder shrug equal, no pronator drift  Extremities: 5/5 motor throughout, SILT, coordination intact throughout    Significant Labs:  Recent Labs   Lab 11/23/22  1810   *   *   K 3.8   CL 96   CO2 25   BUN 20   CREATININE 1.2   CALCIUM 9.7   MG 1.8       Recent Labs   Lab  11/23/22  1810   WBC 11.60   HGB 15.9   HCT 47.1          No results for input(s): LABPT, INR, APTT in the last 48 hours.  Microbiology Results (last 7 days)       ** No results found for the last 168 hours. **          All pertinent labs from the last 24 hours have been reviewed.    Significant Diagnostics:  CT: No results found in the last 24 hours.  I have reviewed all pertinent imaging results/findings within the past 24 hours.  Assessment/Plan:     Brain mass  Candy Oreilly is a 87 y.o. female with PMH including CAD, GERD, thyroid disease and arthritis who presents with 1 week of disorientation and word finding difficulties. 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    --Admit to  for metastatic workup   -q4h neurochecks on floor  --All labs and diagnostics reviewed  --Follow-up MRI w/wo contrast w/ [facility]   -CT chest/abdo/pelvis if suspected mets  --SBP <160 (cardene ggt; hydralazine & labetalol PRN; transition to home meds when appropriate)  --Keppra 500 BID  --Dex 4q6 x7d  --Follow-up full pre-op labs (CBC/CMP/PT-INR/PTT/T&S)  --PUD PPx while steroid treatment ongoing  --Continue to monitor clinically, notify NSGY immediately with any changes in neuro status    Dispo: per primary        Thank you for your consult. I will follow-up with patient. Please contact us if you have any additional questions.    Aliyah Dueñas MD  Neurosurgery  [facility] - Emergency Dept

## 2022-11-24 NOTE — SUBJECTIVE & OBJECTIVE
Past Medical History:   Diagnosis Date    Anxiety disorder, unspecified     Coronary artery disease     GERD (gastroesophageal reflux disease)     Thyroid disease        Past Surgical History:   Procedure Laterality Date    APPENDECTOMY      CHOLECYSTECTOMY         Review of patient's allergies indicates:   Allergen Reactions    Pregabalin Swelling    Atorvastatin Other (See Comments)     Muscle weakness      Dexlansoprazole Other (See Comments)     Other reaction(s): HAND SPASMS  Muscle weakness      Ezetimibe Other (See Comments)     Other reaction(s): MUSCLE PAIN  Muscle pain      Gabapentin Other (See Comments)     Other reaction(s): MAKES HER DIZZY  Dizziness      Onabotulinumtoxina      Medical botox    Oxybutynin Other (See Comments)     Other reaction(s): CANT PEE  Stopped urine flow      Rosuvastatin Other (See Comments)     Leg muscle pain      Sertraline Other (See Comments)     Shaking of hands      Meperidine Nausea Only       No current facility-administered medications on file prior to encounter.     Current Outpatient Medications on File Prior to Encounter   Medication Sig    ALPRAZolam (XANAX) 0.5 MG tablet Take 0.5 mg by mouth daily as needed.    atenoloL (TENORMIN) 50 MG tablet Take 1 tablet by mouth once daily.    celecoxib (CELEBREX) 200 MG capsule Take 1 capsule by mouth once daily.    citalopram (CELEXA) 20 MG tablet Take 1 tablet by mouth once daily.    dexAMETHasone (DECADRON) 2 MG tablet Take 2 tablets (4 mg total) by mouth daily with breakfast. for 10 days    fexofenadine (ALLEGRA) 180 MG tablet Take 1 tablet by mouth once daily.    fluticasone propionate (FLONASE) 50 mcg/actuation nasal spray 1 spray by Each Nostril route once daily.    hydroCHLOROthiazide (HYDRODIURIL) 25 MG tablet Take 0.5 tablets by mouth once daily.    levETIRAcetam (KEPPRA) 500 MG Tab Take 1 tablet (500 mg total) by mouth 2 (two) times daily.    levothyroxine (SYNTHROID) 75 MCG tablet Take 1 tablet by mouth once  daily.    pantoprazole (PROTONIX) 40 MG tablet Take 1 tablet by mouth once daily.     Family History       Problem Relation (Age of Onset)    Breast cancer Sister    Cancer Brother    Colon cancer Father    Lung cancer Father    Thyroid cancer Mother          Tobacco Use    Smoking status: Never    Smokeless tobacco: Never   Substance and Sexual Activity    Alcohol use: Not on file    Drug use: Not on file    Sexual activity: Not on file     Review of Systems   Constitutional:  Positive for activity change. Negative for appetite change, chills and fever.   HENT:  Negative for congestion, hearing loss and rhinorrhea.    Eyes:  Negative for discharge, itching and visual disturbance.   Respiratory:  Negative for apnea, cough and shortness of breath.    Cardiovascular:  Negative for chest pain, palpitations and leg swelling.   Gastrointestinal:  Positive for diarrhea. Negative for abdominal distention, abdominal pain, constipation, nausea and vomiting.   Endocrine: Negative for cold intolerance and heat intolerance.   Genitourinary:  Negative for dysuria and hematuria.   Musculoskeletal:  Negative for back pain, neck pain and neck stiffness.   Skin:  Negative for rash and wound.   Neurological:  Positive for syncope, speech difficulty and light-headedness. Negative for dizziness, seizures and headaches.   Psychiatric/Behavioral:  Negative for agitation, confusion and suicidal ideas.    Objective:     Vital Signs (Most Recent):  Temp: 97.6 °F (36.4 °C) (11/24/22 0307)  Pulse: 69 (11/24/22 0317)  Resp: 20 (11/24/22 0307)  BP: (!) 160/72 (11/24/22 0307)  SpO2: (!) 94 % (11/24/22 0307)   Vital Signs (24h Range):  Temp:  [97.4 °F (36.3 °C)-98.2 °F (36.8 °C)] 97.6 °F (36.4 °C)  Pulse:  [61-69] 69  Resp:  [15-20] 20  SpO2:  [94 %-99 %] 94 %  BP: (140-187)/(63-83) 160/72     Weight: 86.2 kg (190 lb)  There is no height or weight on file to calculate BMI.    Physical Exam  Vitals reviewed.   Constitutional:       General: She is  not in acute distress.     Appearance: She is well-developed.   HENT:      Head: Normocephalic and atraumatic.      Nose: Nose normal. No rhinorrhea.      Mouth/Throat:      Mouth: Mucous membranes are moist.   Eyes:      General: No scleral icterus.        Right eye: No discharge.         Left eye: No discharge.      Pupils: Pupils are equal, round, and reactive to light.   Neck:      Vascular: No JVD.   Cardiovascular:      Rate and Rhythm: Normal rate and regular rhythm.      Heart sounds: Normal heart sounds. No murmur heard.    No friction rub.   Pulmonary:      Effort: Pulmonary effort is normal. No respiratory distress.      Breath sounds: Normal breath sounds. No wheezing.   Abdominal:      General: Bowel sounds are normal. There is no distension.      Palpations: Abdomen is soft.      Tenderness: There is no abdominal tenderness.   Musculoskeletal:         General: No deformity. Normal range of motion.      Cervical back: Normal range of motion and neck supple.   Skin:     General: Skin is warm and dry.   Neurological:      General: No focal deficit present.      Mental Status: She is alert and oriented to person, place, and time.   Psychiatric:         Mood and Affect: Mood normal.         Behavior: Behavior normal.         CRANIAL NERVES     CN III, IV, VI   Pupils are equal, round, and reactive to light.     Significant Labs: All pertinent labs within the past 24 hours have been reviewed.  CBC:   Recent Labs   Lab 11/23/22  1810   WBC 11.60   HGB 15.9   HCT 47.1        CMP:   Recent Labs   Lab 11/23/22  1810   *   K 3.8   CL 96   CO2 25   *   BUN 20   CREATININE 1.2   CALCIUM 9.7   PROT 6.9   ALBUMIN 3.9   BILITOT 1.0   ALKPHOS 85   AST 20   ALT 22   ANIONGAP 13       Significant Imaging: I have reviewed all pertinent imaging results/findings within the past 24 hours.

## 2022-11-24 NOTE — ASSESSMENT & PLAN NOTE
Candy Oreilly is a 87 y.o. female with PMH including CAD, GERD, thyroid disease and arthritis who presents with 1 week of disorientation and word finding difficulties. 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    --Admit to  for metastatic workup   -q4h neurochecks on floor  --All labs and diagnostics reviewed  --CTH 11/23: grossly stable mass compared to 11/21  --MRI 11/23: enhancing L temporal mass with some surrounding edema, no blood on SWI  --CT chest/abdo/pelvis if suspected mets  --SBP <160  --Keppra 500 BID  --Dex 4q6 x7d  --PUD PPx while steroid treatment ongoing  --Continue to monitor clinically, notify NSGY immediately with any changes in neuro status    Dispo: per primary

## 2022-11-24 NOTE — PLAN OF CARE
Problem: Adult Inpatient Plan of Care  Goal: Plan of Care Review  Outcome: Ongoing, Progressing  Goal: Absence of Hospital-Acquired Illness or Injury  Outcome: Ongoing, Progressing  Goal: Optimal Comfort and Wellbeing  Outcome: Ongoing, Progressing  Goal: Readiness for Transition of Care  Outcome: Ongoing, Progressing     Problem: Fluid Imbalance (Pneumonia)  Goal: Fluid Balance  Outcome: Ongoing, Progressing     Problem: Skin Injury Risk Increased  Goal: Skin Health and Integrity  Outcome: Ongoing, Progressing     Problem: Fall Injury Risk  Goal: Absence of Fall and Fall-Related Injury  Outcome: Ongoing, Progressing

## 2022-11-24 NOTE — SUBJECTIVE & OBJECTIVE
Interval History: 11/24: AFVSS. MRI completed showing enhancing L temporal mass with some edema. Met workup per primary team.    Medications:   atenoloL  50 mg Oral Daily    celecoxib  200 mg Oral Daily    cetirizine  5 mg Oral Daily    dexAMETHasone  4 mg Intravenous Q6H    hydroCHLOROthiazide  12.5 mg Oral Daily    levetiracetam IV  500 mg Intravenous Q12H    levothyroxine  75 mcg Oral Daily    pantoprazole  40 mg Oral Daily     PRN: acetaminophen, albuterol-ipratropium, ALPRAZolam, dextrose 10%, dextrose 10%, glucagon (human recombinant), glucose, glucose, melatonin, naloxone, ondansetron, polyethylene glycol, prochlorperazine, simethicone, sodium chloride 0.9%    Objective:     Weight: 86.2 kg (190 lb)  There is no height or weight on file to calculate BMI.  Vital Signs (Most Recent):  Temp: 97.4 °F (36.3 °C) (11/23/22 1706)  Pulse: 66 (11/24/22 0001)  Resp: 20 (11/24/22 0001)  BP: (!) 147/63 (11/24/22 0001)  SpO2: 96 % (11/24/22 0001)   Vital Signs (24h Range):  Temp:  [97.4 °F (36.3 °C)] 97.4 °F (36.3 °C)  Pulse:  [61-68] 66  Resp:  [15-20] 20  SpO2:  [95 %-99 %] 96 %  BP: (140-187)/(63-83) 147/63                          Physical Exam  General: Awake, alert, oriented  Head: Non-traumatic, normocephalic  Eyes: Pupils equal, EOMI  Neck: Supple, normal ROM, no tenderness to palpation  CVS: Normal rate and rhythm  Pulm: Symmetric expansion, no respiratory distress  GI: Abdomen nondistended, nontender  MSK: Moves all extremities without restriction, atraumatic  Skin: Dry, intact  Psych: Normal thought content and cognition    Neurosurgery Physical Exam  AOx3, GCS E4V5M6  Occasional word finding difficulties  CNII-XII: Intact on fine exam, PERRL, visual fields grossly intact, EOMI, facial sensation preserved, no facial asymmetry, tongue midline, shoulder shrug equal, no pronator drift  Extremities: 5/5 motor throughout, SILT, coordination intact throughout    Significant Labs:  Recent Labs   Lab 11/23/22  1817    *   *   K 3.8   CL 96   CO2 25   BUN 20   CREATININE 1.2   CALCIUM 9.7   MG 1.8       Recent Labs   Lab 11/23/22  1810   WBC 11.60   HGB 15.9   HCT 47.1          No results for input(s): LABPT, INR, APTT in the last 48 hours.  Microbiology Results (last 7 days)       ** No results found for the last 168 hours. **          All pertinent labs from the last 24 hours have been reviewed.    Significant Diagnostics:  CT: No results found in the last 24 hours.  I have reviewed all pertinent imaging results/findings within the past 24 hours.

## 2022-11-24 NOTE — ASSESSMENT & PLAN NOTE
Patient is an 87 year old presenting with word difficulty and mild disorientation, found to have an enhancing mass in the medial temporal lobe.    Patient will need tissue biopsy to establish diagnosis.  Agree with CT chest/abdomen/pelvis. Further recs pending imaging.  Steroids per NGSY

## 2022-11-24 NOTE — HOSPITAL COURSE
11/24: AFVSS. MRI completed showing enhancing L temporal mass with some edema. Met workup per primary team.  11/25: NAEON. CT c/a/p showing breast mass and lung opacities. Hem onc plan to biopsy outpatient. F/u EEG for sz.  11/26: NAEON, neuro stable.   11/27: NAEON, neuro stable. No seizure like activity overnight. On EEG  11/28: NAEON. Exam stable. No seizures recorded on 24 hour EEG from 11/26 to 11/27.

## 2022-11-25 DIAGNOSIS — R92.8 OTHER ABNORMAL AND INCONCLUSIVE FINDINGS ON DIAGNOSTIC IMAGING OF BREAST: ICD-10-CM

## 2022-11-25 DIAGNOSIS — G93.89 BRAIN MASS: Primary | ICD-10-CM

## 2022-11-25 DIAGNOSIS — N63.20 MASS OF LEFT BREAST, UNSPECIFIED QUADRANT: ICD-10-CM

## 2022-11-25 LAB
ALBUMIN SERPL BCP-MCNC: 3.3 G/DL (ref 3.5–5.2)
ALP SERPL-CCNC: 76 U/L (ref 55–135)
ALT SERPL W/O P-5'-P-CCNC: 16 U/L (ref 10–44)
ANION GAP SERPL CALC-SCNC: 12 MMOL/L (ref 8–16)
AST SERPL-CCNC: 10 U/L (ref 10–40)
BASOPHILS # BLD AUTO: 0.03 K/UL (ref 0–0.2)
BASOPHILS NFR BLD: 0.2 % (ref 0–1.9)
BILIRUB SERPL-MCNC: 0.7 MG/DL (ref 0.1–1)
BUN SERPL-MCNC: 22 MG/DL (ref 8–23)
CALCIUM SERPL-MCNC: 9.2 MG/DL (ref 8.7–10.5)
CHLORIDE SERPL-SCNC: 100 MMOL/L (ref 95–110)
CO2 SERPL-SCNC: 19 MMOL/L (ref 23–29)
CREAT SERPL-MCNC: 1.3 MG/DL (ref 0.5–1.4)
DIFFERENTIAL METHOD: ABNORMAL
EOSINOPHIL # BLD AUTO: 0 K/UL (ref 0–0.5)
EOSINOPHIL NFR BLD: 0 % (ref 0–8)
ERYTHROCYTE [DISTWIDTH] IN BLOOD BY AUTOMATED COUNT: 13.8 % (ref 11.5–14.5)
EST. GFR  (NO RACE VARIABLE): 39.8 ML/MIN/1.73 M^2
ESTIMATED AVG GLUCOSE: 189 MG/DL (ref 68–131)
GLUCOSE SERPL-MCNC: 422 MG/DL (ref 70–110)
HBA1C MFR BLD: 8.2 % (ref 4–5.6)
HCT VFR BLD AUTO: 43.2 % (ref 37–48.5)
HGB BLD-MCNC: 14.3 G/DL (ref 12–16)
IMM GRANULOCYTES # BLD AUTO: 0.19 K/UL (ref 0–0.04)
IMM GRANULOCYTES NFR BLD AUTO: 1.4 % (ref 0–0.5)
LYMPHOCYTES # BLD AUTO: 1.7 K/UL (ref 1–4.8)
LYMPHOCYTES NFR BLD: 12.5 % (ref 18–48)
MAGNESIUM SERPL-MCNC: 1.8 MG/DL (ref 1.6–2.6)
MCH RBC QN AUTO: 29.1 PG (ref 27–31)
MCHC RBC AUTO-ENTMCNC: 33.1 G/DL (ref 32–36)
MCV RBC AUTO: 88 FL (ref 82–98)
MONOCYTES # BLD AUTO: 0.9 K/UL (ref 0.3–1)
MONOCYTES NFR BLD: 6.1 % (ref 4–15)
NEUTROPHILS # BLD AUTO: 11.1 K/UL (ref 1.8–7.7)
NEUTROPHILS NFR BLD: 79.8 % (ref 38–73)
NRBC BLD-RTO: 0 /100 WBC
PHOSPHATE SERPL-MCNC: 2.6 MG/DL (ref 2.7–4.5)
PLATELET # BLD AUTO: 254 K/UL (ref 150–450)
PMV BLD AUTO: 11.3 FL (ref 9.2–12.9)
POCT GLUCOSE: 203 MG/DL (ref 70–110)
POCT GLUCOSE: 222 MG/DL (ref 70–110)
POCT GLUCOSE: 251 MG/DL (ref 70–110)
POCT GLUCOSE: 341 MG/DL (ref 70–110)
POCT GLUCOSE: 381 MG/DL (ref 70–110)
POTASSIUM SERPL-SCNC: 3.8 MMOL/L (ref 3.5–5.1)
PROT SERPL-MCNC: 5.9 G/DL (ref 6–8.4)
RBC # BLD AUTO: 4.92 M/UL (ref 4–5.4)
SODIUM SERPL-SCNC: 131 MMOL/L (ref 136–145)
WBC # BLD AUTO: 13.88 K/UL (ref 3.9–12.7)

## 2022-11-25 PROCEDURE — 11000001 HC ACUTE MED/SURG PRIVATE ROOM

## 2022-11-25 PROCEDURE — 83735 ASSAY OF MAGNESIUM: CPT | Performed by: INTERNAL MEDICINE

## 2022-11-25 PROCEDURE — 63600175 PHARM REV CODE 636 W HCPCS: Performed by: HOSPITALIST

## 2022-11-25 PROCEDURE — 99233 SBSQ HOSP IP/OBS HIGH 50: CPT | Mod: ,,, | Performed by: HOSPITALIST

## 2022-11-25 PROCEDURE — 25000242 PHARM REV CODE 250 ALT 637 W/ HCPCS: Performed by: HOSPITALIST

## 2022-11-25 PROCEDURE — 94761 N-INVAS EAR/PLS OXIMETRY MLT: CPT

## 2022-11-25 PROCEDURE — 36415 COLL VENOUS BLD VENIPUNCTURE: CPT | Performed by: INTERNAL MEDICINE

## 2022-11-25 PROCEDURE — 25000003 PHARM REV CODE 250: Performed by: HOSPITALIST

## 2022-11-25 PROCEDURE — 25000003 PHARM REV CODE 250: Performed by: INTERNAL MEDICINE

## 2022-11-25 PROCEDURE — 63600175 PHARM REV CODE 636 W HCPCS: Performed by: STUDENT IN AN ORGANIZED HEALTH CARE EDUCATION/TRAINING PROGRAM

## 2022-11-25 PROCEDURE — 83036 HEMOGLOBIN GLYCOSYLATED A1C: CPT | Performed by: HOSPITALIST

## 2022-11-25 PROCEDURE — 80053 COMPREHEN METABOLIC PANEL: CPT | Performed by: INTERNAL MEDICINE

## 2022-11-25 PROCEDURE — 84100 ASSAY OF PHOSPHORUS: CPT | Performed by: INTERNAL MEDICINE

## 2022-11-25 PROCEDURE — 99233 PR SUBSEQUENT HOSPITAL CARE,LEVL III: ICD-10-PCS | Mod: ,,, | Performed by: HOSPITALIST

## 2022-11-25 PROCEDURE — 63600175 PHARM REV CODE 636 W HCPCS

## 2022-11-25 PROCEDURE — 36415 COLL VENOUS BLD VENIPUNCTURE: CPT | Performed by: HOSPITALIST

## 2022-11-25 PROCEDURE — 85025 COMPLETE CBC W/AUTO DIFF WBC: CPT | Performed by: INTERNAL MEDICINE

## 2022-11-25 RX ORDER — IBUPROFEN 200 MG
16 TABLET ORAL
Status: DISCONTINUED | OUTPATIENT
Start: 2022-11-25 | End: 2022-11-25

## 2022-11-25 RX ORDER — IBUPROFEN 200 MG
24 TABLET ORAL
Status: DISCONTINUED | OUTPATIENT
Start: 2022-11-25 | End: 2022-11-25

## 2022-11-25 RX ORDER — INSULIN ASPART 100 [IU]/ML
0-5 INJECTION, SOLUTION INTRAVENOUS; SUBCUTANEOUS
Status: DISCONTINUED | OUTPATIENT
Start: 2022-11-25 | End: 2022-11-25

## 2022-11-25 RX ORDER — INSULIN ASPART 100 [IU]/ML
7 INJECTION, SOLUTION INTRAVENOUS; SUBCUTANEOUS ONCE
Status: DISCONTINUED | OUTPATIENT
Start: 2022-11-25 | End: 2022-11-25

## 2022-11-25 RX ORDER — GLUCAGON 1 MG
1 KIT INJECTION
Status: DISCONTINUED | OUTPATIENT
Start: 2022-11-25 | End: 2022-11-25

## 2022-11-25 RX ORDER — IBUPROFEN 200 MG
24 TABLET ORAL
Status: DISCONTINUED | OUTPATIENT
Start: 2022-11-25 | End: 2022-12-01 | Stop reason: HOSPADM

## 2022-11-25 RX ORDER — INSULIN ASPART 100 [IU]/ML
5 INJECTION, SOLUTION INTRAVENOUS; SUBCUTANEOUS ONCE
Status: DISCONTINUED | OUTPATIENT
Start: 2022-11-25 | End: 2022-11-25

## 2022-11-25 RX ORDER — NIFEDIPINE 30 MG/1
30 TABLET, EXTENDED RELEASE ORAL DAILY
Status: DISCONTINUED | OUTPATIENT
Start: 2022-11-25 | End: 2022-11-26

## 2022-11-25 RX ORDER — HYDRALAZINE HYDROCHLORIDE 25 MG/1
25 TABLET, FILM COATED ORAL EVERY 8 HOURS PRN
Status: DISCONTINUED | OUTPATIENT
Start: 2022-11-25 | End: 2022-12-01 | Stop reason: HOSPADM

## 2022-11-25 RX ORDER — HYDRALAZINE HYDROCHLORIDE 25 MG/1
25 TABLET, FILM COATED ORAL ONCE
Status: COMPLETED | OUTPATIENT
Start: 2022-11-25 | End: 2022-11-25

## 2022-11-25 RX ORDER — INSULIN ASPART 100 [IU]/ML
0-5 INJECTION, SOLUTION INTRAVENOUS; SUBCUTANEOUS
Status: DISCONTINUED | OUTPATIENT
Start: 2022-11-25 | End: 2022-11-28

## 2022-11-25 RX ORDER — INSULIN ASPART 100 [IU]/ML
1-10 INJECTION, SOLUTION INTRAVENOUS; SUBCUTANEOUS
Status: DISCONTINUED | OUTPATIENT
Start: 2022-11-25 | End: 2022-11-25

## 2022-11-25 RX ORDER — CARVEDILOL 6.25 MG/1
6.25 TABLET ORAL 2 TIMES DAILY
Status: DISCONTINUED | OUTPATIENT
Start: 2022-11-25 | End: 2022-11-30

## 2022-11-25 RX ORDER — GLUCAGON 1 MG
1 KIT INJECTION
Status: DISCONTINUED | OUTPATIENT
Start: 2022-11-25 | End: 2022-12-01 | Stop reason: HOSPADM

## 2022-11-25 RX ORDER — SODIUM CHLORIDE 9 MG/ML
INJECTION, SOLUTION INTRAVENOUS CONTINUOUS
Status: DISCONTINUED | OUTPATIENT
Start: 2022-11-25 | End: 2022-11-25

## 2022-11-25 RX ORDER — IBUPROFEN 200 MG
16 TABLET ORAL
Status: DISCONTINUED | OUTPATIENT
Start: 2022-11-25 | End: 2022-12-01 | Stop reason: HOSPADM

## 2022-11-25 RX ORDER — NIFEDIPINE 30 MG/1
30 TABLET, EXTENDED RELEASE ORAL DAILY
Status: DISCONTINUED | OUTPATIENT
Start: 2022-11-26 | End: 2022-11-25

## 2022-11-25 RX ORDER — INSULIN ASPART 100 [IU]/ML
5 INJECTION, SOLUTION INTRAVENOUS; SUBCUTANEOUS
Status: DISCONTINUED | OUTPATIENT
Start: 2022-11-25 | End: 2022-11-26

## 2022-11-25 RX ORDER — FLUTICASONE PROPIONATE 50 MCG
2 SPRAY, SUSPENSION (ML) NASAL DAILY
Status: DISCONTINUED | OUTPATIENT
Start: 2022-11-25 | End: 2022-12-01 | Stop reason: HOSPADM

## 2022-11-25 RX ADMIN — INSULIN ASPART 4 UNITS: 100 INJECTION, SOLUTION INTRAVENOUS; SUBCUTANEOUS at 04:11

## 2022-11-25 RX ADMIN — INSULIN DETEMIR 10 UNITS: 100 INJECTION, SOLUTION SUBCUTANEOUS at 02:11

## 2022-11-25 RX ADMIN — ATENOLOL 50 MG: 50 TABLET ORAL at 09:11

## 2022-11-25 RX ADMIN — DEXAMETHASONE SODIUM PHOSPHATE 4 MG: 4 INJECTION INTRA-ARTICULAR; INTRALESIONAL; INTRAMUSCULAR; INTRAVENOUS; SOFT TISSUE at 12:11

## 2022-11-25 RX ADMIN — PANTOPRAZOLE SODIUM 40 MG: 20 TABLET, DELAYED RELEASE ORAL at 09:11

## 2022-11-25 RX ADMIN — DEXAMETHASONE SODIUM PHOSPHATE 4 MG: 4 INJECTION INTRA-ARTICULAR; INTRALESIONAL; INTRAMUSCULAR; INTRAVENOUS; SOFT TISSUE at 11:11

## 2022-11-25 RX ADMIN — HYDRALAZINE HYDROCHLORIDE 25 MG: 25 TABLET, FILM COATED ORAL at 09:11

## 2022-11-25 RX ADMIN — NIFEDIPINE 30 MG: 30 TABLET, FILM COATED, EXTENDED RELEASE ORAL at 03:11

## 2022-11-25 RX ADMIN — CARVEDILOL 6.25 MG: 6.25 TABLET, FILM COATED ORAL at 11:11

## 2022-11-25 RX ADMIN — LEVOTHYROXINE SODIUM 75 MCG: 75 TABLET ORAL at 09:11

## 2022-11-25 RX ADMIN — INSULIN ASPART 10 UNITS: 100 INJECTION, SOLUTION INTRAVENOUS; SUBCUTANEOUS at 09:11

## 2022-11-25 RX ADMIN — CETIRIZINE HYDROCHLORIDE 5 MG: 5 TABLET, FILM COATED ORAL at 09:11

## 2022-11-25 RX ADMIN — DEXAMETHASONE SODIUM PHOSPHATE 4 MG: 4 INJECTION INTRA-ARTICULAR; INTRALESIONAL; INTRAMUSCULAR; INTRAVENOUS; SOFT TISSUE at 05:11

## 2022-11-25 RX ADMIN — FLUTICASONE PROPIONATE 100 MCG: 50 SPRAY, METERED NASAL at 09:11

## 2022-11-25 RX ADMIN — LEVETIRACETAM 500 MG: 100 INJECTION, SOLUTION INTRAVENOUS at 09:11

## 2022-11-25 RX ADMIN — INSULIN ASPART 2 UNITS: 100 INJECTION, SOLUTION INTRAVENOUS; SUBCUTANEOUS at 11:11

## 2022-11-25 RX ADMIN — HYDRALAZINE HYDROCHLORIDE 25 MG: 25 TABLET, FILM COATED ORAL at 07:11

## 2022-11-25 RX ADMIN — INSULIN ASPART 10 UNITS: 100 INJECTION, SOLUTION INTRAVENOUS; SUBCUTANEOUS at 11:11

## 2022-11-25 RX ADMIN — DEXAMETHASONE SODIUM PHOSPHATE 4 MG: 4 INJECTION INTRA-ARTICULAR; INTRALESIONAL; INTRAMUSCULAR; INTRAVENOUS; SOFT TISSUE at 06:11

## 2022-11-25 RX ADMIN — TRAZODONE HYDROCHLORIDE 25 MG: 50 TABLET ORAL at 09:11

## 2022-11-25 RX ADMIN — SODIUM CHLORIDE: 0.9 INJECTION, SOLUTION INTRAVENOUS at 09:11

## 2022-11-25 RX ADMIN — CELECOXIB 200 MG: 200 CAPSULE ORAL at 09:11

## 2022-11-25 NOTE — PLAN OF CARE
Endocrinology plan of care  11/25/2022    87 year old woman with CAD, hypothyroidism who presented as a transfer for temporal brain mass. She initially presented with difficulty with word finding and mild disorientation. She has associated headaches and nausea. She had a MRI brain on 11/23 that showed enhancing mass in the medial temporal lobe. When she was admitted, Neurosurgery and medical oncology were consulted for brain mass. She was started on dexamethasone and keppra.     Endocrinology was consulted for hyperglycemia.    She does not appear to have DM at baseline. No prior a1c in chart, but a1c in process now. Several days of dexamethasone with steroid-induced hyperglycemia.     Glycemic control improved from last few days with regimen per primary team. Primary team started Detemir 10u qd this afternoon.    Recommendations:  - Continue Levemir 10u qd  - Begin Aspart 5u TIDAC  - Change moderate dose correction to low dose correction scale  - Accuchecks ac/hs    Consult acknowledged. We will see her tomorrow, full H&P to follow.    Rowena Posey MD  [facility], 6th Floor  [address] [place], [place], [address]    Office: [phone]  Fax: [phone]

## 2022-11-25 NOTE — HOSPITAL COURSE
On 11/25; CT C/A/P ordered yesterday. Result came back today showing;  1. Soft tissue mass in the left breast.  Malignancy not excluded.  Recommend correlation with physical exam and mammogram.   2. Few solid and ground-glass opacities in the lungs as detailed above measuring up to 0.6 cm.  Clinical and oncologic considerations will determine the role and schedule for continued surveillance.   3. Indeterminate small focus of sclerosis in the anterior C7 vertebral body.  Bone scan could provide further characterization if clinically indicated.   4. 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     Case was discussed with neurosurgery and oncology in details. Neurosurgery will defer brain bx since there is a breast mass that is more accessible for bx. Oncology will plan for breast tissue bx outpt.   Blood sugar levels are very high this morning, likely 2/2 dexamethasone. I ordered lantus and humalog sliding scale. Endo was consulted. Case was discussed with endo.    On 11/26:  Blood pressure was high yesterday due to anxiety.  Patient became anxious and upset after being diagnosed with possible metastatic breast cancer.  She was emotional, crying.  Multiple doses of extra medication were given including 2 doses of hydralazine 25 mg, as well as Coreg 6.25 mg b.i.d. was started.  This morning, blood pressure and mood are better.  Case was discussed with Endocrine, and adjustment to her insulin regimen were added.  I did spoke to PT/OT.  Patient will likely need skilled nursing facility.  I tried to reach neurosurgery to inquire about patient's DEXA regimen outpatient: But no answer as of yet.  I also consulted radiation oncology.    On 11/27; bp was elevated yesterday, norvasc increased from 5 to 10 mg today.  Ophth was consulted for the left eye blurry vision.     On 11/27; 24 h EEG extended from yesterday to today and it was negative.  Pending radiation oncology eval and SNF  placement.    On 11/28; case discussed with neurosurgery, who recommended dexa 4q6h for one week followed by 2 week slow taper.   Had mammogram and US guided tissue bx done.  Case discussed with case management, pending SNF placement.    On 11/29; seen by ophth.  Pending SNF placement.    On 11/30; bp still on the high side, increased coreg from 6.25 to 12.5 mg bid.     Patient discharged to SNF.

## 2022-11-25 NOTE — PLAN OF CARE
Problem: Adult Inpatient Plan of Care  Goal: Plan of Care Review  Outcome: Ongoing, Progressing     Problem: Fall Injury Risk  Goal: Absence of Fall and Fall-Related Injury  Outcome: Ongoing, Not Progressing  Intervention: Promote Injury-Free Environment  Flowsheets (Taken 11/25/2022 5842)  Safety Promotion/Fall Prevention:   assistive device/personal item within reach   bed alarm refused   Fall Risk reviewed with patient/family   medications reviewed   side rails raised x 2   instructed to call staff for mobility     POC reviewed with patient this shift.  A/O x4.  Respirations unlabored.  Skin w/d.  Continent of b/b.  Pt refusing bed alarm.  Pt up to restroom with x1 standby/vigilance.  Pt c/o H/A at beginning of shift and requested Ibuprofen.  Call placed to team.  MD Julian states no Ibuprofen at this time r/t Dx.  Advised pt of MD response.  States understanding.  PRN Tylenol given instead with full effect.  Tolerates meds whole with water without difficulty.  CT completed this shift.  VSS.  See flowsheet for full assessment.  Able to verbalize wants/needs.  No c/o pain or discomfort at this time.

## 2022-11-25 NOTE — ASSESSMENT & PLAN NOTE
Suspect metastatic given the left breast mass with possible lung mets seen on CT chest today.   Case was discussed with neurosurgery and oncology in details. Neurosurgery will defer brain bx since there is a breast mass that is more accessible for bx. Oncology will plan for breast tissue bx outpt.

## 2022-11-25 NOTE — SUBJECTIVE & OBJECTIVE
Interval History: feels dizzy, unsteady on her feet and has post nasal drip.      Review of Systems   Constitutional:  Negative for chills and fever.   HENT:  Positive for postnasal drip. Negative for sore throat.    Eyes:  Negative for visual disturbance.   Respiratory:  Negative for cough and shortness of breath.    Cardiovascular:  Negative for chest pain and palpitations.   Gastrointestinal:  Negative for abdominal pain.   Endocrine: Negative for cold intolerance, heat intolerance, polydipsia and polyphagia.   Genitourinary:  Negative for dysuria, frequency and urgency.   Musculoskeletal:  Negative for back pain.   Neurological:  Positive for dizziness and light-headedness. Negative for syncope, facial asymmetry, weakness, numbness and headaches.   Psychiatric/Behavioral:  Negative for confusion.    Objective:     Vital Signs (Most Recent):  Temp: 98.1 °F (36.7 °C) (11/25/22 1545)  Pulse: (!) 59 (11/25/22 1545)  Resp: 16 (11/25/22 1545)  BP: (!) 171/81 (11/25/22 1545)  SpO2: 95 % (11/25/22 1545)   Vital Signs (24h Range):  Temp:  [97.3 °F (36.3 °C)-98.6 °F (37 °C)] 98.1 °F (36.7 °C)  Pulse:  [58-77] 59  Resp:  [16-20] 16  SpO2:  [92 %-100 %] 95 %  BP: (127-218)/(59-88) 171/81     Weight: 92 kg (202 lb 13.2 oz)  Body mass index is 35.93 kg/m².    Intake/Output Summary (Last 24 hours) at 11/25/2022 1746  Last data filed at 11/25/2022 0625  Gross per 24 hour   Intake 240 ml   Output --   Net 240 ml      Physical Exam  Vitals reviewed.   Constitutional:       Appearance: Normal appearance.   HENT:      Head: Normocephalic and atraumatic.      Mouth/Throat:      Mouth: Mucous membranes are moist.   Eyes:      Extraocular Movements: Extraocular movements intact.      Pupils: Pupils are equal, round, and reactive to light.   Cardiovascular:      Rate and Rhythm: Normal rate and regular rhythm.   Pulmonary:      Effort: Pulmonary effort is normal.      Breath sounds: Normal breath sounds.   Abdominal:      General:  Abdomen is flat. Bowel sounds are normal.      Palpations: Abdomen is soft.   Musculoskeletal:         General: Normal range of motion.      Cervical back: Normal range of motion and neck supple.   Skin:     General: Skin is warm.   Neurological:      General: No focal deficit present.      Mental Status: She is alert and oriented to person, place, and time.   Psychiatric:         Mood and Affect: Mood normal.         Behavior: Behavior normal.       MELD-Na score: 9 at 11/25/2022  4:17 AM  MELD score: 9 at 11/25/2022  4:17 AM  Calculated from:  Serum Creatinine: 1.3 mg/dL at 11/25/2022  4:17 AM  Serum Sodium: 131 mmol/L at 11/25/2022  4:17 AM  Total Bilirubin: 0.7 mg/dL (Using min of 1 mg/dL) at 11/25/2022  4:17 AM  INR(ratio): 1.0 at 11/24/2022  1:15 AM  Age: 87 years    Significant Labs:  CBC:  Recent Labs   Lab 11/23/22  1810 11/24/22  0346 11/25/22  0417   WBC 11.60 11.11 13.88*   HGB 15.9 14.8 14.3   HCT 47.1 44.5 43.2    264 254     CMP:  Recent Labs   Lab 11/23/22  1810 11/24/22  0346 11/25/22  0417   * 133* 131*   K 3.8 3.6 3.8   CL 96 98 100   CO2 25 24 19*   * 274* 422*   BUN 20 16 22   CREATININE 1.2 0.8 1.3   CALCIUM 9.7 9.3 9.2   PROT 6.9 6.3 5.9*   ALBUMIN 3.9 3.6 3.3*   BILITOT 1.0 1.2* 0.7   ALKPHOS 85 81 76   AST 20 16 10   ALT 22 17 16   ANIONGAP 13 11 12     PTINR:  Recent Labs   Lab 11/24/22  0115   INR 1.0

## 2022-11-26 PROBLEM — I10 HYPERTENSION: Status: ACTIVE | Noted: 2022-11-26

## 2022-11-26 PROBLEM — E03.9 ACQUIRED HYPOTHYROIDISM: Status: ACTIVE | Noted: 2022-11-21

## 2022-11-26 PROBLEM — T38.0X5A ADVERSE EFFECT OF GLUCOCORTICOID OR SYNTHETIC ANALOGUE: Status: ACTIVE | Noted: 2022-11-26

## 2022-11-26 PROBLEM — E11.9 TYPE 2 DIABETES MELLITUS WITHOUT COMPLICATION, WITHOUT LONG-TERM CURRENT USE OF INSULIN: Status: ACTIVE | Noted: 2022-11-24

## 2022-11-26 LAB
ALBUMIN SERPL BCP-MCNC: 3.1 G/DL (ref 3.5–5.2)
ALP SERPL-CCNC: 66 U/L (ref 55–135)
ALT SERPL W/O P-5'-P-CCNC: 15 U/L (ref 10–44)
ANION GAP SERPL CALC-SCNC: 11 MMOL/L (ref 8–16)
AST SERPL-CCNC: 10 U/L (ref 10–40)
BASOPHILS # BLD AUTO: 0.02 K/UL (ref 0–0.2)
BASOPHILS NFR BLD: 0.1 % (ref 0–1.9)
BILIRUB SERPL-MCNC: 1.2 MG/DL (ref 0.1–1)
BUN SERPL-MCNC: 22 MG/DL (ref 8–23)
CALCIUM SERPL-MCNC: 8.5 MG/DL (ref 8.7–10.5)
CHLORIDE SERPL-SCNC: 102 MMOL/L (ref 95–110)
CO2 SERPL-SCNC: 23 MMOL/L (ref 23–29)
CREAT SERPL-MCNC: 0.8 MG/DL (ref 0.5–1.4)
DIFFERENTIAL METHOD: ABNORMAL
EOSINOPHIL # BLD AUTO: 0 K/UL (ref 0–0.5)
EOSINOPHIL NFR BLD: 0 % (ref 0–8)
ERYTHROCYTE [DISTWIDTH] IN BLOOD BY AUTOMATED COUNT: 14.1 % (ref 11.5–14.5)
EST. GFR  (NO RACE VARIABLE): >60 ML/MIN/1.73 M^2
GLUCOSE SERPL-MCNC: 265 MG/DL (ref 70–110)
HCT VFR BLD AUTO: 40.5 % (ref 37–48.5)
HGB BLD-MCNC: 13.5 G/DL (ref 12–16)
IMM GRANULOCYTES # BLD AUTO: 0.18 K/UL (ref 0–0.04)
IMM GRANULOCYTES NFR BLD AUTO: 1.3 % (ref 0–0.5)
LYMPHOCYTES # BLD AUTO: 1.9 K/UL (ref 1–4.8)
LYMPHOCYTES NFR BLD: 13.6 % (ref 18–48)
MAGNESIUM SERPL-MCNC: 1.8 MG/DL (ref 1.6–2.6)
MCH RBC QN AUTO: 28.9 PG (ref 27–31)
MCHC RBC AUTO-ENTMCNC: 33.3 G/DL (ref 32–36)
MCV RBC AUTO: 87 FL (ref 82–98)
MONOCYTES # BLD AUTO: 0.8 K/UL (ref 0.3–1)
MONOCYTES NFR BLD: 5.5 % (ref 4–15)
NEUTROPHILS # BLD AUTO: 10.8 K/UL (ref 1.8–7.7)
NEUTROPHILS NFR BLD: 79.5 % (ref 38–73)
NRBC BLD-RTO: 0 /100 WBC
PHOSPHATE SERPL-MCNC: 3.7 MG/DL (ref 2.7–4.5)
PLATELET # BLD AUTO: 241 K/UL (ref 150–450)
PMV BLD AUTO: 11.6 FL (ref 9.2–12.9)
POCT GLUCOSE: 238 MG/DL (ref 70–110)
POCT GLUCOSE: 264 MG/DL (ref 70–110)
POCT GLUCOSE: 265 MG/DL (ref 70–110)
POCT GLUCOSE: 281 MG/DL (ref 70–110)
POTASSIUM SERPL-SCNC: 3.7 MMOL/L (ref 3.5–5.1)
PROT SERPL-MCNC: 5.5 G/DL (ref 6–8.4)
RBC # BLD AUTO: 4.67 M/UL (ref 4–5.4)
SODIUM SERPL-SCNC: 136 MMOL/L (ref 136–145)
WBC # BLD AUTO: 13.62 K/UL (ref 3.9–12.7)

## 2022-11-26 PROCEDURE — 99232 PR SUBSEQUENT HOSPITAL CARE,LEVL II: ICD-10-PCS | Mod: GC,,, | Performed by: NEUROLOGICAL SURGERY

## 2022-11-26 PROCEDURE — 63600175 PHARM REV CODE 636 W HCPCS: Performed by: STUDENT IN AN ORGANIZED HEALTH CARE EDUCATION/TRAINING PROGRAM

## 2022-11-26 PROCEDURE — 99222 1ST HOSP IP/OBS MODERATE 55: CPT | Mod: GC,,, | Performed by: GENERAL ACUTE CARE HOSPITAL

## 2022-11-26 PROCEDURE — 86300 IMMUNOASSAY TUMOR CA 15-3: CPT | Performed by: STUDENT IN AN ORGANIZED HEALTH CARE EDUCATION/TRAINING PROGRAM

## 2022-11-26 PROCEDURE — 97116 GAIT TRAINING THERAPY: CPT

## 2022-11-26 PROCEDURE — 97165 OT EVAL LOW COMPLEX 30 MIN: CPT

## 2022-11-26 PROCEDURE — 95714 VEEG EA 12-26 HR UNMNTR: CPT

## 2022-11-26 PROCEDURE — 94761 N-INVAS EAR/PLS OXIMETRY MLT: CPT

## 2022-11-26 PROCEDURE — 99233 SBSQ HOSP IP/OBS HIGH 50: CPT | Mod: ,,, | Performed by: HOSPITALIST

## 2022-11-26 PROCEDURE — 99233 PR SUBSEQUENT HOSPITAL CARE,LEVL III: ICD-10-PCS | Mod: ,,, | Performed by: HOSPITALIST

## 2022-11-26 PROCEDURE — 99222 PR INITIAL HOSPITAL CARE,LEVL II: ICD-10-PCS | Mod: GC,,, | Performed by: GENERAL ACUTE CARE HOSPITAL

## 2022-11-26 PROCEDURE — 97161 PT EVAL LOW COMPLEX 20 MIN: CPT

## 2022-11-26 PROCEDURE — 63600175 PHARM REV CODE 636 W HCPCS

## 2022-11-26 PROCEDURE — 99232 SBSQ HOSP IP/OBS MODERATE 35: CPT | Mod: GC,,, | Performed by: NEUROLOGICAL SURGERY

## 2022-11-26 PROCEDURE — 80053 COMPREHEN METABOLIC PANEL: CPT | Performed by: INTERNAL MEDICINE

## 2022-11-26 PROCEDURE — 85025 COMPLETE CBC W/AUTO DIFF WBC: CPT | Performed by: INTERNAL MEDICINE

## 2022-11-26 PROCEDURE — 95720 EEG PHY/QHP EA INCR W/VEEG: CPT | Mod: ,,, | Performed by: PSYCHIATRY & NEUROLOGY

## 2022-11-26 PROCEDURE — 95720 PR EEG, W/VIDEO, CONT RECORD, I&R, >12<26 HRS: ICD-10-PCS | Mod: ,,, | Performed by: PSYCHIATRY & NEUROLOGY

## 2022-11-26 PROCEDURE — 25000003 PHARM REV CODE 250: Performed by: HOSPITALIST

## 2022-11-26 PROCEDURE — 25000003 PHARM REV CODE 250: Performed by: INTERNAL MEDICINE

## 2022-11-26 PROCEDURE — 83735 ASSAY OF MAGNESIUM: CPT | Performed by: INTERNAL MEDICINE

## 2022-11-26 PROCEDURE — 97535 SELF CARE MNGMENT TRAINING: CPT

## 2022-11-26 PROCEDURE — 95700 EEG CONT REC W/VID EEG TECH: CPT

## 2022-11-26 PROCEDURE — 11000001 HC ACUTE MED/SURG PRIVATE ROOM

## 2022-11-26 PROCEDURE — 84100 ASSAY OF PHOSPHORUS: CPT | Performed by: INTERNAL MEDICINE

## 2022-11-26 RX ORDER — INSULIN ASPART 100 [IU]/ML
7 INJECTION, SOLUTION INTRAVENOUS; SUBCUTANEOUS
Status: DISCONTINUED | OUTPATIENT
Start: 2022-11-26 | End: 2022-11-28

## 2022-11-26 RX ORDER — LEVETIRACETAM 500 MG/1
500 TABLET ORAL 2 TIMES DAILY
Status: DISCONTINUED | OUTPATIENT
Start: 2022-11-26 | End: 2022-12-01 | Stop reason: HOSPADM

## 2022-11-26 RX ORDER — INSULIN ASPART 100 [IU]/ML
6 INJECTION, SOLUTION INTRAVENOUS; SUBCUTANEOUS
Status: DISCONTINUED | OUTPATIENT
Start: 2022-11-26 | End: 2022-11-26

## 2022-11-26 RX ADMIN — PANTOPRAZOLE SODIUM 40 MG: 20 TABLET, DELAYED RELEASE ORAL at 08:11

## 2022-11-26 RX ADMIN — INSULIN ASPART 2 UNITS: 100 INJECTION, SOLUTION INTRAVENOUS; SUBCUTANEOUS at 08:11

## 2022-11-26 RX ADMIN — HYDRALAZINE HYDROCHLORIDE 25 MG: 25 TABLET, FILM COATED ORAL at 08:11

## 2022-11-26 RX ADMIN — Medication 6 MG: at 09:11

## 2022-11-26 RX ADMIN — LEVETIRACETAM 500 MG: 100 INJECTION, SOLUTION INTRAVENOUS at 09:11

## 2022-11-26 RX ADMIN — CARVEDILOL 6.25 MG: 6.25 TABLET, FILM COATED ORAL at 08:11

## 2022-11-26 RX ADMIN — INSULIN ASPART 5 UNITS: 100 INJECTION, SOLUTION INTRAVENOUS; SUBCUTANEOUS at 08:11

## 2022-11-26 RX ADMIN — INSULIN ASPART 3 UNITS: 100 INJECTION, SOLUTION INTRAVENOUS; SUBCUTANEOUS at 09:11

## 2022-11-26 RX ADMIN — DEXAMETHASONE SODIUM PHOSPHATE 4 MG: 4 INJECTION INTRA-ARTICULAR; INTRALESIONAL; INTRAMUSCULAR; INTRAVENOUS; SOFT TISSUE at 02:11

## 2022-11-26 RX ADMIN — LEVOTHYROXINE SODIUM 75 MCG: 75 TABLET ORAL at 08:11

## 2022-11-26 RX ADMIN — INSULIN ASPART 3 UNITS: 100 INJECTION, SOLUTION INTRAVENOUS; SUBCUTANEOUS at 12:11

## 2022-11-26 RX ADMIN — LEVETIRACETAM 500 MG: 500 TABLET, FILM COATED ORAL at 08:11

## 2022-11-26 RX ADMIN — CETIRIZINE HYDROCHLORIDE 5 MG: 5 TABLET, FILM COATED ORAL at 08:11

## 2022-11-26 RX ADMIN — INSULIN ASPART 7 UNITS: 100 INJECTION, SOLUTION INTRAVENOUS; SUBCUTANEOUS at 04:11

## 2022-11-26 RX ADMIN — TRAZODONE HYDROCHLORIDE 25 MG: 50 TABLET ORAL at 09:11

## 2022-11-26 RX ADMIN — INSULIN ASPART 3 UNITS: 100 INJECTION, SOLUTION INTRAVENOUS; SUBCUTANEOUS at 04:11

## 2022-11-26 RX ADMIN — FLUTICASONE PROPIONATE 100 MCG: 50 SPRAY, METERED NASAL at 08:11

## 2022-11-26 RX ADMIN — DEXAMETHASONE SODIUM PHOSPHATE 4 MG: 4 INJECTION INTRA-ARTICULAR; INTRALESIONAL; INTRAMUSCULAR; INTRAVENOUS; SOFT TISSUE at 05:11

## 2022-11-26 NOTE — ASSESSMENT & PLAN NOTE
Newly diagnosed DM this admission, likely T2DM  Lab Results   Component Value Date    HGBA1C 8.2 (H) 11/25/2022     BG above goal on current regimen - increasing both prandial and basal doses as outlined below  Note prandial heavy regimen due to high dose corticosteroids currently (started this admission for brain mass. Previously no glucocorticoids outpatient)      - Increase Levemir to 12u qd  - Increase Aspart to 7u AC  - Continue low dose correction scale  - accuchecks ac/hs  - DM diet  - Will need all testing supplies prescribed at d/c. Additionally, pt should begin to learn how to check BG - can ask RN to allow pt to perform supervised accuchecks  - Will determine d/c recs based on insulin dose requirement and plan for steroids - as she may not need insulin at d/c if she will not continue on steroids as her a1c is 8, which is only slightly above her goal of <8 given her age and comorbidities    - Hypoglycemia protocol in place  - If blood glucose greater than 300, please ask patient not to eat food or drink anything other than water until correctional insulin has brought it back below 250    ** Please notify Endocrine for any change and/or advance in diet**  ** Please call Endocrine for any BG related issues **

## 2022-11-26 NOTE — ASSESSMENT & PLAN NOTE
Suspect metastatic breast cancer.  Patient has what appears to be bilateral lung nodules, and also brain mass.  Oncology will arrange outpatient tissue biopsy.

## 2022-11-26 NOTE — ASSESSMENT & PLAN NOTE
Candy Oreilly is a 87 y.o. female with PMH including CAD, GERD, thyroid disease and arthritis who presents with 1 week of disorientation and word finding difficulties. 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    --Admit to  for metastatic workup   -q4h neurochecks on floor  --CTH 11/23: grossly stable mass compared to 11/21  --MRI 11/23: enhancing L temporal mass with some surrounding edema, no blood on SWI  --CT chest/abdo/pelvis 11/24: soft tissue mass L breast, opacities in lungs, C7 body sclerosis, b/l calfied plaques (asbestos)   -Heme onc consulted - plan for L breast mass biopsy outpatient    - defer brain biopsy given breast mass more accessibly biopsed  --SBP <160  --Keppra 500 BID  --F/u EEG for possible seizures  --Dex 4q6 x7d  --PUD PPx while steroid treatment ongoing  --Continue to monitor clinically, notify NSGY immediately with any changes in neuro status    Dispo: per primary

## 2022-11-26 NOTE — PROGRESS NOTES
[facility] - Neurosurgery ([facility])  [facility] Medicine  Progress Note    Patient Name: Candy Oreilly  MRN: [id number]  Patient Class: IP- Inpatient   Admission Date: 11/23/2022  Length of Stay: 1 days  Attending Physician: Briana Hui MD  Primary Care Provider: Chata Hurley MD        Subjective:     Principal Problem:Brain mass        HPI:  88 yo female with CAD, GERD, thyroid disease, arthritis, anxiety presenting to outside hospital for difficulty with word finding and mild disorientation transferred to [facility] for concern of L temporal brain mass. She has been having syncopal episodes as well during this past week as well during the word finding difficulties. She had some LLE tingling as well. This was not getting any better so her family brought her in for evaluation to outside ED. CT showing brain mass, transferred here for DEV vela. She denies any long history of smoking, she denies any weight loss fevers, no abnormal vaginal bleeding. She does occasionally get hemorrhoidal bleeding. She does report some diarrhea that last a while a few weeks ago, but has been clearing up. She denies any melena or BRBPR. She did have normal screening exams but those ended 2/2 age.     In ED, NSGY and NCC dane'd, approved for floor admission to medicine. Patient started on keppra and steroids. Medicine called for possible cancer workup.      Overview/Hospital Course:  On 11/26; CT C/A/P ordered yesterday. Result came back today showing;  1. Soft tissue mass in the left breast.  Malignancy not excluded.  Recommend correlation with physical exam and mammogram.   2. Few solid and ground-glass opacities in the lungs as detailed above measuring up to 0.6 cm.  Clinical and oncologic considerations will determine the role and schedule for continued surveillance.   3. Indeterminate small focus of sclerosis in the anterior C7 vertebral body.  Bone scan could provide further characterization if clinically indicated.   4.  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     Case was discussed with neurosurgery and oncology in details. Neurosurgery will defer brain bx since there is a breast mass that is more accessible for bx. Oncology will plan for breast tissue bx outpt.   Blood sugar levels are very high this morning, likely 2/2 dexamethasone. I ordered lantus and humalog sliding scale. Endo was consulted. Case was discussed with endo.        Interval History: feels dizzy, unsteady on her feet and has post nasal drip.      Review of Systems   Constitutional:  Negative for chills and fever.   HENT:  Positive for postnasal drip. Negative for sore throat.    Eyes:  Negative for visual disturbance.   Respiratory:  Negative for cough and shortness of breath.    Cardiovascular:  Negative for chest pain and palpitations.   Gastrointestinal:  Negative for abdominal pain.   Endocrine: Negative for cold intolerance, heat intolerance, polydipsia and polyphagia.   Genitourinary:  Negative for dysuria, frequency and urgency.   Musculoskeletal:  Negative for back pain.   Neurological:  Positive for dizziness and light-headedness. Negative for syncope, facial asymmetry, weakness, numbness and headaches.   Psychiatric/Behavioral:  Negative for confusion.    Objective:     Vital Signs (Most Recent):  Temp: 98.1 °F (36.7 °C) (11/25/22 1545)  Pulse: (!) 59 (11/25/22 1545)  Resp: 16 (11/25/22 1545)  BP: (!) 171/81 (11/25/22 1545)  SpO2: 95 % (11/25/22 1545)   Vital Signs (24h Range):  Temp:  [97.3 °F (36.3 °C)-98.6 °F (37 °C)] 98.1 °F (36.7 °C)  Pulse:  [58-77] 59  Resp:  [16-20] 16  SpO2:  [92 %-100 %] 95 %  BP: (127-218)/(59-88) 171/81     Weight: 92 kg (202 lb 13.2 oz)  Body mass index is 35.93 kg/m².    Intake/Output Summary (Last 24 hours) at 11/25/2022 6713  Last data filed at 11/25/2022 0625  Gross per 24 hour   Intake 240 ml   Output --   Net 240 ml      Physical Exam  Vitals reviewed.   Constitutional:        Appearance: Normal appearance.   HENT:      Head: Normocephalic and atraumatic.      Mouth/Throat:      Mouth: Mucous membranes are moist.   Eyes:      Extraocular Movements: Extraocular movements intact.      Pupils: Pupils are equal, round, and reactive to light.   Cardiovascular:      Rate and Rhythm: Normal rate and regular rhythm.   Pulmonary:      Effort: Pulmonary effort is normal.      Breath sounds: Normal breath sounds.   Abdominal:      General: Abdomen is flat. Bowel sounds are normal.      Palpations: Abdomen is soft.   Musculoskeletal:         General: Normal range of motion.      Cervical back: Normal range of motion and neck supple.   Skin:     General: Skin is warm.   Neurological:      General: No focal deficit present.      Mental Status: She is alert and oriented to person, place, and time.   Psychiatric:         Mood and Affect: Mood normal.         Behavior: Behavior normal.       MELD-Na score: 9 at 11/25/2022  4:17 AM  MELD score: 9 at 11/25/2022  4:17 AM  Calculated from:  Serum Creatinine: 1.3 mg/dL at 11/25/2022  4:17 AM  Serum Sodium: 131 mmol/L at 11/25/2022  4:17 AM  Total Bilirubin: 0.7 mg/dL (Using min of 1 mg/dL) at 11/25/2022  4:17 AM  INR(ratio): 1.0 at 11/24/2022  1:15 AM  Age: 87 years    Significant Labs:  CBC:  Recent Labs   Lab 11/23/22  1810 11/24/22  0346 11/25/22  0417   WBC 11.60 11.11 13.88*   HGB 15.9 14.8 14.3   HCT 47.1 44.5 43.2    264 254     CMP:  Recent Labs   Lab 11/23/22  1810 11/24/22  0346 11/25/22  0417   * 133* 131*   K 3.8 3.6 3.8   CL 96 98 100   CO2 25 24 19*   * 274* 422*   BUN 20 16 22   CREATININE 1.2 0.8 1.3   CALCIUM 9.7 9.3 9.2   PROT 6.9 6.3 5.9*   ALBUMIN 3.9 3.6 3.3*   BILITOT 1.0 1.2* 0.7   ALKPHOS 85 81 76   AST 20 16 10   ALT 22 17 16   ANIONGAP 13 11 12     PTINR:  Recent Labs   Lab 11/24/22  0115   INR 1.0           Assessment/Plan:      * Brain mass  Suspect metastatic given the left breast mass with possible lung  mets seen on CT chest today.   Case was discussed with neurosurgery and oncology in details. Neurosurgery will defer brain bx since there is a breast mass that is more accessible for bx. Oncology will plan for breast tissue bx outpt.     Mass of left breast  Suspect metastatic breast cancer.  Patient has what appears to be bilateral lung nodules, and also brain mass.  Oncology will arrange outpatient tissue biopsy.      Hyperglycemia  Acute, likely from steroid injection  Does not have history of diabetes  Blood sugar went into the 400's this morning.  Started moderate sliding scale and consulted endo. I also added levemir 10 units.   Case was discussed with endo.  Endo recommended to add the aspart 5 units with meal, and decrease sliding scale to low.  We will keep Levemir 10 units daily.  Hemoglobin A1c was ordered this morning, but currently still pending.      Hypercholesterolemia  Chronic, controlled, continue statin      Gastroesophageal reflux disease  Chronic, controlled, continue PPI      Disorder of thyroid  Chronic, controlled, continue synthroid      Anxiety  Chronic, controlled, PRN benzo if needed        VTE Risk Mitigation (From admission, onward)         Ordered     IP VTE HIGH RISK PATIENT  Once         11/24/22 0232     Place sequential compression device  Until discontinued         11/24/22 0232     Reason for No Pharmacological VTE Prophylaxis  Once        Question:  Reasons:  Answer:  Risk of Bleeding    11/24/22 0232                Discharge Planning   LOVE:      Code Status: Full Code   Is the patient medically ready for discharge?:     Reason for patient still in hospital (select all that apply): Patient trending condition           I have spent more than 60 minutes taking care of Mrs. Oreilly today with more than 50% of that time was spent coordinating care and reviewing record.  Case was discussed with Neurosurgery, Oncology, endocrinology.  As well as the nurse multiple times.      Briana ZHOU  MD Korina  Department of Hospital Medicine   [facility] - Neurosurgery ([facility])

## 2022-11-26 NOTE — PLAN OF CARE
Problem: Physical Therapy  Goal: Physical Therapy Goal  Description: PT goals to be met by: 12/16/22    Patient will perform supine <> sitting with independence.  Patient will perform sit <> stand transitions with supervision using RW.  Patient will perform transfers from bed <> chair or BSC with supervision using RW.  Patient will ambulate 100 feet with supervision using RW.  Outcome: Ongoing, Progressing

## 2022-11-26 NOTE — CONSULTS
[facility] - Neurosurgery ([facility])  Endocrinology  Diabetes Consult Note    Consult Requested by: Briana Hui MD   Reason for admit: Brain mass    HISTORY OF PRESENT ILLNESS:  Reason for Consult: Management of newly diagnosed T2DM, Hyperglycemia     Surgical Procedure and Date: n/a    Diabetes diagnosis year: 2022 (current admission)    Home Diabetes Medications:  none    How often checking glucose at home?  N/a    BG readings on regimen: n/a  Hypoglycemia on the regimen?  n/a  Missed doses on regimen?  n/a    Diabetes Complications include:     Hyperglycemia    Complicating diabetes co morbidities:   Active Cancer and CAD      HPI:   Patient is a 87 y.o. female with a diagnosis of CAD, GERD, and hypothyroidism presented on 2022 to OSH with 1 week of disorientation and word finding difficulties. She has occasional episodes of wanting to pass out with associated LLE tingling. CTH at OSH revealed temporal brain mass. [place] [facility] for new brain mass. [place] [facility] [place] obtained and revealed L breast soft tissue mass and pulmonary opacities, and sclerosis of C7. Heme/onc was consulted, they recommend biopsy of L breast mass outpatient. NSGY recommended Keppra and 7-day course of dexamethasone 4mg q6h.    Endocrinology was consulted for hyperglycemia and newly diagnosed DM.    No previous knowledge of DM. She has been experiencing polyuria and polydipsia at home however.  No family hx of diabetes  She has hypothyroidism (for many years per pt) and is on levothyroxine 75 mcg daily. TSH 2022 at OSH normal. She is taking LT4 correctly.     Lab Results   Component Value Date    HGBA1C 8.2 (H) 2022           Interval HPI:   Overnight events: Remains on neuro floor. BG above goal ranges on current SQ insulin regimen. Receiving Dex 4 mg q 8 hrs. Diet Cardiac    Eatin%  Nausea: No  Hypoglycemia and intervention: No  Fever: No  TPN and/or TF:  No  If yes, type of TF/TPN and rate: n/a    PMH, PSH, FH, SH reviewed     Review of Systems   Constitutional:  Negative for appetite change, chills and fever.   HENT:  Negative for trouble swallowing and voice change.    Eyes:  Negative for pain and visual disturbance.   Respiratory:  Negative for cough and shortness of breath.    Cardiovascular:  Negative for chest pain and palpitations.   Gastrointestinal:  Negative for abdominal distention, abdominal pain, nausea and vomiting.   Endocrine: Positive for polydipsia and polyuria. Negative for cold intolerance and heat intolerance.   Genitourinary:  Negative for difficulty urinating and dysuria.   Musculoskeletal:  Positive for arthralgias (chronic). Negative for myalgias.   Skin:  Negative for wound.   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 Negative for tremors and headaches.   Psychiatric/Behavioral:  Negative for agitation and confusion.      Current Medications and/or Treatments Impacting Glycemic Control  Immunotherapy:    Immunosuppressants       None          Steroids:   Hormones (From admission, onward)      Start     Stop Route Frequency Ordered    11/24/22 0331  melatonin tablet 6 mg         -- Oral Nightly PRN 11/24/22 0232    11/24/22 0000  dexAMETHasone injection 4 mg         -- IV Every 6 hours 11/23/22 2247          Pressors:    Autonomic Drugs (From admission, onward)      None          Hyperglycemia/Diabetes Medications:   Antihyperglycemics (From admission, onward)      Start     Stop Route Frequency Ordered    11/25/22 1830  insulin aspart U-100 pen 0-5 Units         -- SubQ As needed (PRN) 11/25/22 1731    11/25/22 1745  insulin aspart U-100 pen 5 Units         -- SubQ 3 times daily with meals 11/25/22 1731    11/25/22 1400  insulin detemir U-100 pen 10 Units         -- SubQ Daily 11/25/22 1358             PHYSICAL EXAMINATION:  Vitals:    11/26/22 0757   BP:    Pulse: 66   Resp:     Temp:      Body mass index is 35.93 kg/m².    Physical Exam  Vitals and nursing note reviewed.   Constitutional:       General: She is not in acute distress.     Appearance: She is obese. She is not ill-appearing.   HENT:      Head: Normocephalic.      Mouth/Throat:      Mouth: Mucous membranes are moist.   Eyes:      Conjunctiva/sclera: Conjunctivae normal.   Cardiovascular:      Rate and Rhythm: Normal rate and regular rhythm.   Pulmonary:      Effort: Pulmonary effort is normal. No respiratory distress.   Abdominal:      General: There is no distension.      Palpations: Abdomen is soft.      Tenderness: There is no abdominal tenderness.   Musculoskeletal:         General: No swelling.      Cervical back: Normal range of motion and neck supple.   Skin:     General: Skin is warm and dry.   Neurological:      Mental Status: She is alert and oriented to person, place, and time.      Comments: Occasional word finding difficulty    Psychiatric:         Mood and Affect: Mood normal.         Behavior: Behavior normal.         Labs Reviewed and Include   Recent Labs   Lab 11/26/22  0541   *   CALCIUM 8.5*   ALBUMIN 3.1*   PROT 5.5*      K 3.7   CO2 23      BUN 22   CREATININE 0.8   ALKPHOS 66   ALT 15   AST 10   BILITOT 1.2*     Lab Results   Component Value Date    WBC 13.62 (H) 11/26/2022    HGB 13.5 11/26/2022    HCT 40.5 11/26/2022    MCV 87 11/26/2022     11/26/2022     Recent Labs   Lab 11/21/22  1307   TSH 2.4840     Lab Results   Component Value Date    HGBA1C 8.2 (H) 11/25/2022       Nutritional status:   Body mass index is 35.93 kg/m².  Lab Results   Component Value Date    ALBUMIN 3.1 (L) 11/26/2022    ALBUMIN 3.3 (L) 11/25/2022    ALBUMIN 3.6 11/24/2022     No results found for: PREALBUMIN    Estimated Creatinine Clearance: 53.3 mL/min (based on SCr of 0.8 mg/dL).    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 Previously no glucocorticoids outpatient)      - Increase Levemir to 12u qd  - Increase Aspart to 7u AC  - Continue low dose correction scale  - accuchecks ac/hs  - DM diet  - Will need all testing supplies prescribed at d/c. Additionally, pt should begin to learn how to check BG - can ask RN to allow pt to perform supervised accuchecks  - Will determine d/c recs based on insulin dose requirement and plan for steroids - as she may not need insulin at d/c if she will not continue on steroids as her a1c is 8, which is only slightly above her goal of <8 given her age and comorbidities    - Hypoglycemia protocol in place  - If blood glucose greater than 300, please ask patient not to eat food or drink anything other than water until correctional insulin has brought it back below 250    ** Please notify Endocrine for any change and/or advance in diet**  ** Please call Endocrine for any BG related issues **      Acquired hypothyroidism  Pt reports she  has had hypothyroidism for many years   Taking levothyroxine 75 mcg daily - no recent dose adjustments. She is taking it correctly, first thing in the morning without food or other meds.  TSH at OSH Normal  Continue home levothyroxine    Lab Results   Component Value Date    TSH 2.4840 11/21/2022             Plan discussed with patient, family, and RN at bedside.     Rowena Posey MD  Endocrinology  [facility] - Neurosurgery ([facility])

## 2022-11-26 NOTE — SUBJECTIVE & OBJECTIVE
Interval History: still feels dizzy, forgetful, stated that she is not sure if she can live by herself any more.       Review of Systems   Constitutional:  Negative for chills and fever.   HENT:  Negative for sore throat.    Eyes:  Negative for visual disturbance.   Respiratory:  Negative for cough and shortness of breath.    Cardiovascular:  Negative for chest pain and palpitations.   Gastrointestinal:  Negative for abdominal pain.   Endocrine: Negative for cold intolerance, heat intolerance, polydipsia and polyphagia.   Genitourinary:  Negative for dysuria, frequency and urgency.   Musculoskeletal:  Negative for back pain.   Neurological:  Positive for dizziness and light-headedness. Negative for syncope, facial asymmetry, weakness, numbness and headaches.        Forgetfulness, unsteady gait.    Objective:     Vital Signs (Most Recent):  Temp: 98.8 °F (37.1 °C) (11/26/22 1205)  Pulse: 71 (11/26/22 1205)  Resp: 16 (11/26/22 1205)  BP: 127/61 (11/26/22 1205)  SpO2: (!) 91 % (11/26/22 1205)   Vital Signs (24h Range):  Temp:  [97.2 °F (36.2 °C)-98.8 °F (37.1 °C)] 98.8 °F (37.1 °C)  Pulse:  [54-75] 71  Resp:  [16-20] 16  SpO2:  [91 %-99 %] 91 %  BP: (110-199)/(55-82) 127/61     Weight: 92 kg (202 lb 13.2 oz)  Body mass index is 35.93 kg/m².    Intake/Output Summary (Last 24 hours) at 11/26/2022 1507  Last data filed at 11/26/2022 0610  Gross per 24 hour   Intake 980 ml   Output --   Net 980 ml        Physical Exam  Vitals reviewed.   Constitutional:       Appearance: Normal appearance.   HENT:      Head: Normocephalic and atraumatic.      Mouth/Throat:      Mouth: Mucous membranes are moist.   Eyes:      Extraocular Movements: Extraocular movements intact.      Pupils: Pupils are equal, round, and reactive to light.   Cardiovascular:      Rate and Rhythm: Normal rate and regular rhythm.   Pulmonary:      Effort: Pulmonary effort is normal.      Breath sounds: Normal breath sounds.   Abdominal:      General: Abdomen is  flat. Bowel sounds are normal.      Palpations: Abdomen is soft.   Musculoskeletal:         General: Normal range of motion.      Cervical back: Normal range of motion and neck supple.   Skin:     General: Skin is warm.   Neurological:      General: No focal deficit present.      Mental Status: She is alert and oriented to person, place, and time.   Psychiatric:      Comments: Less emotional this morning.        MELD-Na score: 7 at 11/26/2022  5:41 AM  MELD score: 7 at 11/26/2022  5:41 AM  Calculated from:  Serum Creatinine: 0.8 mg/dL (Using min of 1 mg/dL) at 11/26/2022  5:41 AM  Serum Sodium: 136 mmol/L at 11/26/2022  5:41 AM  Total Bilirubin: 1.2 mg/dL at 11/26/2022  5:41 AM  INR(ratio): 1.0 at 11/24/2022  1:15 AM  Age: 87 years    Significant Labs:  CBC:  Recent Labs   Lab 11/25/22  0417 11/26/22  0541   WBC 13.88* 13.62*   HGB 14.3 13.5   HCT 43.2 40.5    241       CMP:  Recent Labs   Lab 11/25/22  0417 11/26/22  0541   * 136   K 3.8 3.7    102   CO2 19* 23   * 265*   BUN 22 22   CREATININE 1.3 0.8   CALCIUM 9.2 8.5*   PROT 5.9* 5.5*   ALBUMIN 3.3* 3.1*   BILITOT 0.7 1.2*   ALKPHOS 76 66   AST 10 10   ALT 16 15   ANIONGAP 12 11       PTINR:  No results for input(s): INR in the last 48 hours.

## 2022-11-26 NOTE — PLAN OF CARE
Problem: Adult Inpatient Plan of Care  Goal: Plan of Care Review  Outcome: Ongoing, Progressing  Flowsheets (Taken 11/26/2022 0343)  Plan of Care Reviewed With: patient     Problem: Fall Injury Risk  Goal: Absence of Fall and Fall-Related Injury  Intervention: Promote Injury-Free Environment  Flowsheets (Taken 11/26/2022 0343)  Safety Promotion/Fall Prevention:   assistive device/personal item within reach   bed alarm refused   Fall Risk reviewed with patient/family   nonskid shoes/socks when out of bed   side rails raised x 2     Problem: Adult Inpatient Plan of Care  Goal: Optimal Comfort and Wellbeing  Outcome: Ongoing, Progressing     Problem: Skin Injury Risk Increased  Goal: Skin Health and Integrity  Outcome: Ongoing, Progressing     POC reviewed with patient, verbalized understanding. Pt was very stressed after being told the CT showed lung and breast mass. BP became elevated 199/76. Received Hydralazine 25mg PO twice, and MD rockwellluis IVF, Atenolol, and Celebrex. Coreg started for better BP control. BP rechecked manually, now 138/56. Pt renita while asleep (40s-50s) but not sustained. Sugar high in the 200s, managed with SS. Fall precautions maintained. Bed locked and in lowest position. Pt refused bed alarm. See flow sheet for full assessment and VS info, will continue to monitor.         Patient received discharge paperwork from RN. Patient verbalized understanding of discharge paper word. Patient ambulatory out of the ER.

## 2022-11-26 NOTE — PROGRESS NOTES
[facility] - Neurosurgery ([facility])  Neurosurgery  Progress Note    Subjective:     History of Present Illness: Candy Oreilly is a 87 y.o. female with PMH including CAD, GERD, thyroid disease and arthritis who presents with 1 week of disorientation and word finding difficulties. She has occasional episodes of wanting to pass out with associated LLE tingling. Patient went to OSH ED on Monday where they did a CTH and found a brain mass. She presents to the ED at [facility] [place] to establish care. Patient started taking the ED prescribed keppra and decadron today. 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      Post-Op Info:  * No surgery found *         Interval History: 11/26: NEHAON, neuro stable.     Medications:   carvediloL  6.25 mg Oral BID    cetirizine  5 mg Oral Daily    dexAMETHasone  4 mg Intravenous Q6H    fluticasone propionate  2 spray Each Nostril Daily    insulin aspart U-100  5 Units Subcutaneous TIDWM    insulin detemir U-100  10 Units Subcutaneous Daily    levetiracetam IV  500 mg Intravenous Q12H    levothyroxine  75 mcg Oral Daily    NIFEdipine  30 mg Oral Daily    pantoprazole  40 mg Oral BID     PRN: acetaminophen, albuterol-ipratropium, ALPRAZolam, dextrose 10%, dextrose 10%, glucagon (human recombinant), glucose, glucose, hydrALAZINE, influenza 65up-adj, insulin aspart U-100, melatonin, naloxone, ondansetron, polyethylene glycol, prochlorperazine, simethicone, sodium chloride 0.9%, traZODone    Objective:     Weight: 92 kg (202 lb 13.2 oz)  Body mass index is 35.93 kg/m².  Vital Signs (Most Recent):  Temp: 98.3 °F (36.8 °C) (11/26/22 0734)  Pulse: (!) 58 (11/26/22 0734)  Resp: 16 (11/26/22 0734)  BP: 129/63 (11/26/22 0734)  SpO2: 98 % (11/26/22 0734)   Vital Signs (24h Range):  Temp:  [97.2 °F (36.2 °C)-98.5 °F (36.9 °C)] 98.3 °F (36.8 °C)  Pulse:  [54-75] 58  Resp:  [16-20] 16  SpO2:  [95 %-99 %] 98 %  BP: (110-199)/(55-82) 129/63                             Physical Exam  General: Awake, alert, oriented  Head: Non-traumatic, normocephalic  Eyes: Pupils equal, EOMI  Neck: Supple, normal ROM, no tenderness to palpation  CVS: Normal rate and rhythm  Pulm: Symmetric expansion, no respiratory distress  GI: Abdomen nondistended, nontender  MSK: Moves all extremities without restriction, atraumatic  Skin: Dry, intact  Psych: Normal thought content and cognition    Neurosurgery Physical Exam  AOx3, GCS E4V5M6  Occasional word finding difficulties  CNII-XII: Intact on fine exam, PERRL, visual fields grossly intact, EOMI, facial sensation preserved, no facial asymmetry, tongue midline, shoulder shrug equal, no pronator drift  Extremities: 5/5 motor throughout, SILT, coordination intact throughout    Significant Labs:  Recent Labs   Lab 11/25/22 0417 11/26/22  0541   * 265*   * 136   K 3.8 3.7    102   CO2 19* 23   BUN 22 22   CREATININE 1.3 0.8   CALCIUM 9.2 8.5*   MG 1.8 1.8       Recent Labs   Lab 11/25/22 0417 11/26/22  0541   WBC 13.88* 13.62*   HGB 14.3 13.5   HCT 43.2 40.5    241       No results for input(s): LABPT, INR, APTT in the last 48 hours.    Microbiology Results (last 7 days)       ** No results found for the last 168 hours. **          All pertinent labs from the last 24 hours have been reviewed.    Significant Diagnostics:  CT: No results found in the last 24 hours.  I have reviewed all pertinent imaging results/findings within the past 24 hours.  Review of Systems    Assessment/Plan:     * Brain mass  Candy Oreilly is a 87 y.o. female with PMH including CAD, GERD, thyroid disease and arthritis who presents with 1 week of disorientation and word finding difficulties. 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    --Admit to  for metastatic workup   -q4h neurochecks on floor  --CTH 11/23: grossly stable mass compared to 11/21  --MRI 11/23: enhancing L temporal mass with some surrounding edema, no  blood on SWI  --CT chest/abdo/pelvis 11/24: soft tissue mass L breast, opacities in lungs, C7 body sclerosis, b/l calfied plaques (asbestos)   -Heme onc consulted - plan for L breast mass biopsy outpatient    - defer brain biopsy given breast mass more accessibly biopsed  --SBP <160  --Keppra 500 BID  --F/u EEG for possible seizures  --Dex 4q6 x7d  --PUD PPx while steroid treatment ongoing  --Continue to monitor clinically, notify NSGY immediately with any changes in neuro status    Dispo: per primary          Roland Almeida MD  Neurosurgery  [facility] - Neurosurgery ([facility])

## 2022-11-26 NOTE — PROGRESS NOTES
[facility] - Neurosurgery ([facility])  Neurosurgery  Progress Note    Subjective:     History of Present Illness: Candy Oreilly is a 87 y.o. female with PMH including CAD, GERD, thyroid disease and arthritis who presents with 1 week of disorientation and word finding difficulties. She has occasional episodes of wanting to pass out with associated LLE tingling. Patient went to OSH ED on Monday where they did a CTH and found a brain mass. She presents [place] ED at [facility] [place] to establish care. Patient started taking the ED prescribed keppra and decadron today. 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      Post-Op Info:  * No surgery found *         Interval History: 11/25: NAEON. CT c/a/p showing breast mass and lung opacities. Hem onc plan to biopsy outpatient. F/u EEG for sz.    Medications:   atenoloL  50 mg Oral Daily    celecoxib  200 mg Oral Daily    cetirizine  5 mg Oral Daily    dexAMETHasone  4 mg Intravenous Q6H    fluticasone propionate  2 spray Each Nostril Daily    hydrALAZINE  25 mg Oral Once    insulin aspart U-100  5 Units Subcutaneous TIDWM    insulin detemir U-100  10 Units Subcutaneous Daily    levetiracetam IV  500 mg Intravenous Q12H    levothyroxine  75 mcg Oral Daily    NIFEdipine  30 mg Oral Daily    pantoprazole  40 mg Oral BID     PRN: acetaminophen, albuterol-ipratropium, ALPRAZolam, dextrose 10%, dextrose 10%, glucagon (human recombinant), glucose, glucose, hydrALAZINE, influenza 65up-adj, insulin aspart U-100, melatonin, naloxone, ondansetron, polyethylene glycol, prochlorperazine, simethicone, sodium chloride 0.9%    Objective:     Weight: 92 kg (202 lb 13.2 oz)  Body mass index is 35.93 kg/m².  Vital Signs (Most Recent):  Temp: 98.5 °F (36.9 °C) (11/25/22 1917)  Pulse: (!) 59 (11/25/22 1917)  Resp: 18 (11/25/22 1917)  BP: (!) 184/82 (11/25/22 1926)  SpO2: 99 % (11/25/22 1917)   Vital Signs (24h Range):  Temp:  [97.3 °F (36.3 °C)-98.6 °F  (37 °C)] 98.5 °F (36.9 °C)  Pulse:  [58-77] 59  Resp:  [16-20] 18  SpO2:  [92 %-100 %] 99 %  BP: (127-218)/(59-88) 184/82     Date 11/25/22 0700 - 11/26/22 0659   Shift 7225-9680 4090-9983 4152-4615 24 Hour Total   INTAKE   P.O. 480 480  960   Shift Total(mL/kg) 480(5.2) 480(5.2)  960(10.4)   OUTPUT   Shift Total(mL/kg)       Weight (kg) 92 92 92 92                        Physical Exam  General: Awake, alert, oriented  Head: Non-traumatic, normocephalic  Eyes: Pupils equal, EOMI  Neck: Supple, normal ROM, no tenderness to palpation  CVS: Normal rate and rhythm  Pulm: Symmetric expansion, no respiratory distress  GI: Abdomen nondistended, nontender  MSK: Moves all extremities without restriction, atraumatic  Skin: Dry, intact  Psych: Normal thought content and cognition    Neurosurgery Physical Exam  AOx3, GCS E4V5M6  Occasional word finding difficulties  CNII-XII: Intact on fine exam, PERRL, visual fields grossly intact, EOMI, facial sensation preserved, no facial asymmetry, tongue midline, shoulder shrug equal, no pronator drift  Extremities: 5/5 motor throughout, SILT, coordination intact throughout    Significant Labs:  Recent Labs   Lab 11/24/22  0346 11/25/22  0417   * 422*   * 131*   K 3.6 3.8   CL 98 100   CO2 24 19*   BUN 16 22   CREATININE 0.8 1.3   CALCIUM 9.3 9.2   MG 1.9 1.8       Recent Labs   Lab 11/24/22  0346 11/25/22  0417   WBC 11.11 13.88*   HGB 14.8 14.3   HCT 44.5 43.2    254       Recent Labs   Lab 11/24/22  0115   INR 1.0   APTT 22.4     Microbiology Results (last 7 days)       ** No results found for the last 168 hours. **          All pertinent labs from the last 24 hours have been reviewed.    Significant Diagnostics:  CT: No results found in the last 24 hours.  I have reviewed all pertinent imaging results/findings within the past 24 hours.  Review of Systems    Assessment/Plan:     * Brain mass  Candy Oreilly is a 87 y.o. female with PMH including CAD, GERD,  thyroid disease and arthritis who presents with 1 week of disorientation and word finding difficulties. 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    --Admit to  for metastatic workup   -q4h neurochecks on floor  --CTH 11/23: grossly stable mass compared to 11/21  --MRI 11/23: enhancing L temporal mass with some surrounding edema, no blood on SWI  --CT chest/abdo/pelvis 11/24: soft tissue mass L breast, opacities in lungs, C7 body sclerosis, b/l calfied plaques (asbestos)   -Heme onc consulted - plan for L breast mass biopsy outpatient    - defer brain biopsy given breast mass more accessibly biopsed  --SBP <160  --Keppra 500 BID  --F/u EEG for possible seizures  --Dex 4q6 x7d  --PUD PPx while steroid treatment ongoing  --Continue to monitor clinically, notify NSGY immediately with any changes in neuro status    Dispo: per primary          Aliyah Dueñas MD  Neurosurgery  [facility] - Neurosurgery ([facility])

## 2022-11-26 NOTE — ASSESSMENT & PLAN NOTE
Suspect metastatic given the left breast mass with possible lung mets seen on CT chest today.   Neurosurgery will defer brain bx since there is a breast mass that is more accessible for bx. Oncology will plan for breast tissue bx outpt.   Continue dexamethasone 4 mg q6h. I inquired about the dexa taper dose yesterday and today, but have not received an answer from neurosurgery as of yet.  24 h EEG today to rule out seizure.

## 2022-11-26 NOTE — ASSESSMENT & PLAN NOTE
Pt reports she has had hypothyroidism for many years   Taking levothyroxine 75 mcg daily - no recent dose adjustments. She is taking it correctly, first thing in the morning without food or other meds.  TSH at OSH Normal  Continue home levothyroxine    Lab Results   Component Value Date    TSH 2.4840 11/21/2022

## 2022-11-26 NOTE — SUBJECTIVE & OBJECTIVE
Interval History: 11/25: NAEON. CT c/a/p showing breast mass and lung opacities. Hem onc plan to biopsy outpatient. F/u EEG for sz.    Medications:   atenoloL  50 mg Oral Daily    celecoxib  200 mg Oral Daily    cetirizine  5 mg Oral Daily    dexAMETHasone  4 mg Intravenous Q6H    fluticasone propionate  2 spray Each Nostril Daily    hydrALAZINE  25 mg Oral Once    insulin aspart U-100  5 Units Subcutaneous TIDWM    insulin detemir U-100  10 Units Subcutaneous Daily    levetiracetam IV  500 mg Intravenous Q12H    levothyroxine  75 mcg Oral Daily    NIFEdipine  30 mg Oral Daily    pantoprazole  40 mg Oral BID     PRN: acetaminophen, albuterol-ipratropium, ALPRAZolam, dextrose 10%, dextrose 10%, glucagon (human recombinant), glucose, glucose, hydrALAZINE, influenza 65up-adj, insulin aspart U-100, melatonin, naloxone, ondansetron, polyethylene glycol, prochlorperazine, simethicone, sodium chloride 0.9%    Objective:     Weight: 92 kg (202 lb 13.2 oz)  Body mass index is 35.93 kg/m².  Vital Signs (Most Recent):  Temp: 98.5 °F (36.9 °C) (11/25/22 1917)  Pulse: (!) 59 (11/25/22 1917)  Resp: 18 (11/25/22 1917)  BP: (!) 184/82 (11/25/22 1926)  SpO2: 99 % (11/25/22 1917)   Vital Signs (24h Range):  Temp:  [97.3 °F (36.3 °C)-98.6 °F (37 °C)] 98.5 °F (36.9 °C)  Pulse:  [58-77] 59  Resp:  [16-20] 18  SpO2:  [92 %-100 %] 99 %  BP: (127-218)/(59-88) 184/82     Date 11/25/22 0700 - 11/26/22 0659   Shift 3962-5246 2424-6921 2277-2691 24 Hour Total   INTAKE   P.O. 480 480  960   Shift Total(mL/kg) 480(5.2) 480(5.2)  960(10.4)   OUTPUT   Shift Total(mL/kg)       Weight (kg) 92 92 92 92                        Physical Exam  General: Awake, alert, oriented  Head: Non-traumatic, normocephalic  Eyes: Pupils equal, EOMI  Neck: Supple, normal ROM, no tenderness to palpation  CVS: Normal rate and rhythm  Pulm: Symmetric expansion, no respiratory distress  GI: Abdomen nondistended, nontender  MSK: Moves all extremities without restriction,  atraumatic  Skin: Dry, intact  Psych: Normal thought content and cognition    Neurosurgery Physical Exam  AOx3, GCS E4V5M6  Occasional word finding difficulties  CNII-XII: Intact on fine exam, PERRL, visual fields grossly intact, EOMI, facial sensation preserved, no facial asymmetry, tongue midline, shoulder shrug equal, no pronator drift  Extremities: 5/5 motor throughout, SILT, coordination intact throughout    Significant Labs:  Recent Labs   Lab 11/24/22  0346 11/25/22  0417   * 422*   * 131*   K 3.6 3.8   CL 98 100   CO2 24 19*   BUN 16 22   CREATININE 0.8 1.3   CALCIUM 9.3 9.2   MG 1.9 1.8       Recent Labs   Lab 11/24/22  0346 11/25/22  0417   WBC 11.11 13.88*   HGB 14.8 14.3   HCT 44.5 43.2    254       Recent Labs   Lab 11/24/22  0115   INR 1.0   APTT 22.4     Microbiology Results (last 7 days)       ** No results found for the last 168 hours. **          All pertinent labs from the last 24 hours have been reviewed.    Significant Diagnostics:  CT: No results found in the last 24 hours.  I have reviewed all pertinent imaging results/findings within the past 24 hours.  Review of Systems

## 2022-11-26 NOTE — PLAN OF CARE
Problem: Occupational Therapy  Goal: Occupational Therapy Goal  Description: Goals to be met by: 12/24/22     Patient will increase functional independence with ADLs by performing:    UE Dressing with [place].  LE Dressing with [place].  Grooming while standing with [place].  Toileting from toilet with [place] for hygiene and clothing management.   Bathing from  shower chair/bench with [place].    Outcome: Ongoing, Progressing

## 2022-11-26 NOTE — PROGRESS NOTES
[facility] - Neurosurgery ([facility])  [facility]  Progress Note    Patient Name: Candy Oreilly  MRN: [id number]  Patient Class: IP- Inpatient   Admission Date: 11/23/2022  Length of Stay: 2 days  Attending Physician: Briana Hui MD  Primary Care Provider: Chata Hurley MD        Subjective:     Principal Problem:Brain mass        HPI:  88 yo female with CAD, GERD, thyroid disease, arthritis, anxiety presenting to outside hospital for difficulty with word finding and mild disorientation transferred to [facility] for concern of L temporal brain mass. She has been having syncopal episodes as well during this past week as well during the word finding difficulties. She had some LLE tingling as well. This was not getting any better so her family brought her in for evaluation to outside ED. CT showing brain mass, transferred here for DEV vela. She denies any long history of smoking, she denies any weight loss fevers, no abnormal vaginal bleeding. She does occasionally get hemorrhoidal bleeding. She does report some diarrhea that last a while a few weeks ago, but has been clearing up. She denies any melena or BRBPR. She did have normal screening exams but those ended 2/2 age.     In ED, NSGY and NCC dane'd, approved for floor admission to medicine. Patient started on keppra and steroids. Medicine called for possible cancer workup.      Overview/Hospital Course:  On 11/25; CT C/A/P ordered yesterday. Result came back today showing;  1. Soft tissue mass in the left breast.  Malignancy not excluded.  Recommend correlation with physical exam and mammogram.   2. Few solid and ground-glass opacities in the lungs as detailed above measuring up to 0.6 cm.  Clinical and oncologic considerations will determine the role and schedule for continued surveillance.   3. Indeterminate small focus of sclerosis in the anterior C7 vertebral body.  Bone scan could provide further characterization if clinically indicated.   4.  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     Case was discussed with neurosurgery and oncology in details. Neurosurgery will defer brain bx since there is a breast mass that is more accessible for bx. Oncology will plan for breast tissue bx outpt.   Blood sugar levels are very high this morning, likely 2/2 dexamethasone. I ordered lantus and humalog sliding scale. Endo was consulted. Case was discussed with endo.    On 11/26:  Blood pressure was high yesterday due to anxiety.  Patient became anxious and upset after being diagnosed with possible metastatic breast cancer.  She was emotional, crying.  Multiple doses of extra medication were given including 2 doses of hydralazine 25 mg, as well as Coreg 6.25 mg b.i.d. was started.  This morning, blood pressure and mood are better.  Case was discussed with Endocrine, and adjustment to her insulin regimen were added.  I did spoke to PT/OT.  Patient will likely need skilled nursing facility.  I tried to reach neurosurgery to inquire about patient's DEXA regimen outpatient: But no answer as of yet.  I also consulted radiation oncology.      Interval History: feels dizzy, unsteady on her feet and has post nasal drip.      Review of Systems   Constitutional:  Negative for chills and fever.   HENT:  Negative for sore throat.    Eyes:  Negative for visual disturbance.   Respiratory:  Negative for cough and shortness of breath.    Cardiovascular:  Negative for chest pain and palpitations.   Gastrointestinal:  Negative for abdominal pain.   Endocrine: Negative for cold intolerance, heat intolerance, polydipsia and polyphagia.   Genitourinary:  Negative for dysuria, frequency and urgency.   Musculoskeletal:  Negative for back pain.   Neurological:  Positive for dizziness and light-headedness. Negative for syncope, facial asymmetry, weakness, numbness and headaches.        Forgetfulness, unsteady gait.    Objective:     Vital Signs (Most  Recent):  Temp: 98.8 °F (37.1 °C) (11/26/22 1205)  Pulse: 71 (11/26/22 1205)  Resp: 16 (11/26/22 1205)  BP: 127/61 (11/26/22 1205)  SpO2: (!) 91 % (11/26/22 1205)   Vital Signs (24h Range):  Temp:  [97.2 °F (36.2 °C)-98.8 °F (37.1 °C)] 98.8 °F (37.1 °C)  Pulse:  [54-75] 71  Resp:  [16-20] 16  SpO2:  [91 %-99 %] 91 %  BP: (110-199)/(55-82) 127/61     Weight: 92 kg (202 lb 13.2 oz)  Body mass index is 35.93 kg/m².    Intake/Output Summary (Last 24 hours) at 11/26/2022 1507  Last data filed at 11/26/2022 0610  Gross per 24 hour   Intake 980 ml   Output --   Net 980 ml        Physical Exam  Vitals reviewed.   Constitutional:       Appearance: Normal appearance.   HENT:      Head: Normocephalic and atraumatic.      Mouth/Throat:      Mouth: Mucous membranes are moist.   Eyes:      Extraocular Movements: Extraocular movements intact.      Pupils: Pupils are equal, round, and reactive to light.   Cardiovascular:      Rate and Rhythm: Normal rate and regular rhythm.   Pulmonary:      Effort: Pulmonary effort is normal.      Breath sounds: Normal breath sounds.   Abdominal:      General: Abdomen is flat. Bowel sounds are normal.      Palpations: Abdomen is soft.   Musculoskeletal:         General: Normal range of motion.      Cervical back: Normal range of motion and neck supple.   Skin:     General: Skin is warm.   Neurological:      General: No focal deficit present.      Mental Status: She is alert and oriented to person, place, and time.   Psychiatric:      Comments: Less emotional this morning.        MELD-Na score: 7 at 11/26/2022  5:41 AM  MELD score: 7 at 11/26/2022  5:41 AM  Calculated from:  Serum Creatinine: 0.8 mg/dL (Using min of 1 mg/dL) at 11/26/2022  5:41 AM  Serum Sodium: 136 mmol/L at 11/26/2022  5:41 AM  Total Bilirubin: 1.2 mg/dL at 11/26/2022  5:41 AM  INR(ratio): 1.0 at 11/24/2022  1:15 AM  Age: 87 years    Significant Labs:  CBC:  Recent Labs   Lab 11/25/22  0417 11/26/22  0541   WBC 13.88* 13.62*    HGB 14.3 13.5   HCT 43.2 40.5    241       CMP:  Recent Labs   Lab 11/25/22  0417 11/26/22  0541   * 136   K 3.8 3.7    102   CO2 19* 23   * 265*   BUN 22 22   CREATININE 1.3 0.8   CALCIUM 9.2 8.5*   PROT 5.9* 5.5*   ALBUMIN 3.3* 3.1*   BILITOT 0.7 1.2*   ALKPHOS 76 66   AST 10 10   ALT 16 15   ANIONGAP 12 11       PTINR:  No results for input(s): INR in the last 48 hours.          Assessment/Plan:      * Brain mass  Suspect metastatic given the left breast mass with possible lung mets seen on CT chest today.   Neurosurgery will defer brain bx since there is a breast mass that is more accessible for bx. Oncology will plan for breast tissue bx outpt.   Continue dexamethasone 4 mg q6h. I inquired about the dexa taper dose yesterday and today, but have not received an answer from neurosurgery as of yet.  24 h EEG today to rule out seizure.    Mass of left breast  Suspect metastatic breast cancer.  Patient has what appears to be bilateral lung nodules, and also brain mass.  Oncology will arrange outpatient tissue biopsy.      Type 2 diabetes mellitus without complication, without long-term current use of insulin  Acute, likely from steroid injection  Does not have history of diabetes prior to admission  HgbA1c came back at 8.2.  Case was discussed with endo today.  Endo will increase levemir from 10 to 12 units, and aspart from 5 to 7 units with meals. Keep sliding scale low.     Hypertension  I have changed her atenolol to coreg.   Continue hydralazine prn for sbp >160.      Adverse effect of glucocorticoid or synthetic analogue  Elevated blood sugar and HTN.   See plan of care of each.      Hypercholesterolemia  Chronic, controlled, continue statin      Gastroesophageal reflux disease  Chronic, controlled, continue PPI      Acquired hypothyroidism  Chronic, controlled, continue synthroid      Anxiety  Chronic, controlled, PRN benzo if needed        VTE Risk Mitigation (From admission,  onward)           Ordered     IP VTE HIGH RISK PATIENT  Once         11/24/22 0232     Place sequential compression device  Until discontinued         11/24/22 0232     Reason for No Pharmacological VTE Prophylaxis  Once        Question:  Reasons:  Answer:  Risk of Bleeding    11/24/22 0232                    Discharge Planning   LOVE:      Code Status: Full Code   Is the patient medically ready for discharge?:     Reason for patient still in hospital (select all that apply): Patient trending condition                     Briana Hiu MD  Department of Hospital Medicine   [facility] - Neurosurgery ([facility])

## 2022-11-26 NOTE — ASSESSMENT & PLAN NOTE
Currently on dexamethasone 4mg q6h for brain mass per NSGY  Will increase BG, especially prandial -- this prandial heavy insulin regimen currently   Please notify endocrine of plan for steroids at d/c as this will impact our d/c recommendations

## 2022-11-26 NOTE — ASSESSMENT & PLAN NOTE
New L breast mass and L temporal brain mass concerning for malignancy - planning for outpatient bx of breast mass  Management per primary

## 2022-11-26 NOTE — PT/OT/SLP EVAL
Occupational Therapy   Co-Evaluation and Treatment    Patient Name: Candy Oreilly   MRN: [id number]  Admit Date: 11/23/2022  Admitting Diagnosis: Brain mass   Recent Surgery: * No surgery found *      Co-treatment performed for this visit due to patient need for two skilled therapists to ensure patient and staff safety and to accommodate for patient activity tolerance/pain management   Recommendations:     Discharge Recommendations: nursing facility, skilled  Discharge Equipment Recommendations: walker, rolling, 3-in-1 commode, shower chair  Barriers to discharge: Other (Comment) (INC assistance required)    Assessment:     Candy Oreilly is a 87 y.o. female with a medical diagnosis of Brain mass. She presents with performance deficits affecting function including impaired functional mobility, impaired endurance, decreased safety awareness, impaired self care skills, impaired balance.     Pt presents ambulatory in room, going through her suitcase to get ready for a shower upon OT/PT co-evaluation entry. Pt demo's good UB strength, c/o increased blurryness to Left eye visual field despite intact visual tracking during eval, decreased balance w/ OOB mobility, and poor safety awareness. Pt req'd CGA x 1 w/ fxnl mobility 2/2 to instability, SBA w/ fxnl transfers, and supervision w/ ADLs on this date as further detailed below. D/C to SNF.     Rehab Prognosis: Good; patient would benefit from acute skilled OT services to address these deficits and reach maximum level of function.     Plan:     Patient to be seen 3 x/week to address the above listed problems via self-care/home management, therapeutic activities, therapeutic exercises, neuromuscular re-education, cognitive retraining  Plan of Care Expires: 12/24/22  Plan of Care Reviewed with: patient    Subjective     Communicated with RN prior to session. Patient found ambulatory in room/garcia upon OT entry to room, agreeable to evaluation.     Chief Complaint:  "Abnormal Ct Scan (Reports of Brain Mass on head CT. Was suppose to be transferred here on 11/21 but daughter stated would bring her here herself)    Patient/Family Comments/Goals: "the nursing home will always be a never option for me."    Occupational Profile:  Living Environment: Patient lives alone in 2SH w/ 0STE with access to bedroom & bathroom on first floor. 2 story home with no steps to enter.    Prior Level of Function: Prior to admission, patient was independent with ADLs & mobility  Roles and Routines: She reports being able to do all her household tasks and stays active inside; neighbors take care of yardwork and grocery shopping. Pt still drives and is retired.   Equipment Used at Home:FLOW(5344:LAST:1:1)@  DME owned (not currently used): none  Upon discharge, patient will have assistance from family and friend.    Pain/Comfort:  Pain Rating 1: 0/10  Pain Rating Post-Intervention 1: 0/10    Objective:   Patient found with: telemetry     General Precautions: Standard, fall   Orthopedic Precautions:N/A   Braces: N/A   Respiratory Status:   Room air  Vitals: /62 (BP Location: Left arm, Patient Position: Lying)   Pulse 60   Temp 97.7 °F (36.5 °C) (Oral)   Resp 16   Ht 5' 3" (1.6 m)   Wt 92 kg (202 lb 13.2 oz)   LMP  (LMP Unknown)   SpO2 98%   BMI 35.93 kg/m²     Cognitive and Psychosocial Function:   AxOx4 -- Person, Place, Time, and Situation   Follows Commands/attention: follows one-step commands  Communication: clear/fluent and intermittent difficulty w/ word finding  Memory: Impaired STM and Poor immediate recall  Safety awareness/insight to disability: impaired   Mood/Affect/Coping skills/emotional control: Appropriate to situation    Hearing: Intact    Vision: blurry vision to Left visual field    Physical Exam:  Postural examination/scapula alignment:    -       No postural abnormalities identified  Skin integrity: Visible skin intact     Left UE Right UE   UE Edema absent absent   UE " ROM AROM WFL AROM WFL   UE Strength 5/5 5/5    Strength grasp WFL grasp WFL   Sensation LUE INTACT:WFL, light/touch, and sharp/dull  RUE INTACT: light/touch and sharp/dull    Fine Motor Coordination:  LUE INTACT: hand, finger to nose, hand thumb/finger opposition skills , manipulation of objects, and diadochokinesis skill RUE INTACT: hand, finger to nose, hand thumb/finger opposition skills , manipulation of objects, and diadochokinesis skill   Gross Motor Coordination: LUE INTACT: WFL RUE INTACT:WFL       Functional Mobility/Transfers:  Sit <> Stand Transfer with stand by assistance with no assistive device  Toilet Transfer Step Transfer technique with stand by assistance with no AD  Bed <> Chair Transfer: Step Transfer technique with stand by assistance with no assistive device    Activities of Daily Living:  Grooming: supervision for safety while standing at sink    Upper Body Dressing: supervision bernarda shirt seated EOB  Lower Body Dressing: supervision to bernarda b/l socks and shoes seated EOB  Toileting: stand by assistance to complete pericare while seated on commode in bathroom    AMPAC 6 Click ADL:  AMPAC Total Score: 24    Treatment & Education:  Therapist provided facilitation and instruction of proper body mechanics, energy conservation, and fall prevention strategies during tasks listed above.  Pt educated on role of OT, POC and goals for therapy  Pt educated on importance of OOB activities with staff member assistance and sitting OOB majority of the day.       Patient left sitting edge of bed with all lines intact and call button in reach.    GOALS:   Multidisciplinary Problems       Occupational Therapy Goals          Problem: Occupational Therapy    Goal Priority Disciplines Outcome Interventions   Occupational Therapy Goal     OT, PT/OT Ongoing, Progressing    Description: Goals to be met by: 12/24/22     Patient will increase functional independence with ADLs by performing:    UE Dressing with  [place].  LE Dressing with [place].  Grooming while standing with [place].  Toileting from toilet with [place] for hygiene and clothing management.   Bathing from  shower chair/bench with [place].                         History:     Past Medical History:   Diagnosis Date    Anxiety disorder, unspecified     Coronary artery disease     GERD (gastroesophageal reflux disease)     Thyroid disease          Past Surgical History:   Procedure Laterality Date    APPENDECTOMY      CHOLECYSTECTOMY         Time Tracking:     OT Date of Treatment: 11/26/22  OT Start Time: 0943  OT Stop Time: 1004  OT Total Time (min): 21 min  Additional staff present: PT      Billable Minutes: Evaluation 10 Self Care/Home Management 11    11/26/2022

## 2022-11-26 NOTE — SUBJECTIVE & OBJECTIVE
Interval History: 11/26: NAEON, neuro stable.     Medications:   carvediloL  6.25 mg Oral BID    cetirizine  5 mg Oral Daily    dexAMETHasone  4 mg Intravenous Q6H    fluticasone propionate  2 spray Each Nostril Daily    insulin aspart U-100  5 Units Subcutaneous TIDWM    insulin detemir U-100  10 Units Subcutaneous Daily    levetiracetam IV  500 mg Intravenous Q12H    levothyroxine  75 mcg Oral Daily    NIFEdipine  30 mg Oral Daily    pantoprazole  40 mg Oral BID     PRN: acetaminophen, albuterol-ipratropium, ALPRAZolam, dextrose 10%, dextrose 10%, glucagon (human recombinant), glucose, glucose, hydrALAZINE, influenza 65up-adj, insulin aspart U-100, melatonin, naloxone, ondansetron, polyethylene glycol, prochlorperazine, simethicone, sodium chloride 0.9%, traZODone    Objective:     Weight: 92 kg (202 lb 13.2 oz)  Body mass index is 35.93 kg/m².  Vital Signs (Most Recent):  Temp: 98.3 °F (36.8 °C) (11/26/22 0734)  Pulse: (!) 58 (11/26/22 0734)  Resp: 16 (11/26/22 0734)  BP: 129/63 (11/26/22 0734)  SpO2: 98 % (11/26/22 0734)   Vital Signs (24h Range):  Temp:  [97.2 °F (36.2 °C)-98.5 °F (36.9 °C)] 98.3 °F (36.8 °C)  Pulse:  [54-75] 58  Resp:  [16-20] 16  SpO2:  [95 %-99 %] 98 %  BP: (110-199)/(55-82) 129/63                            Physical Exam  General: Awake, alert, oriented  Head: Non-traumatic, normocephalic  Eyes: Pupils equal, EOMI  Neck: Supple, normal ROM, no tenderness to palpation  CVS: Normal rate and rhythm  Pulm: Symmetric expansion, no respiratory distress  GI: Abdomen nondistended, nontender  MSK: Moves all extremities without restriction, atraumatic  Skin: Dry, intact  Psych: Normal thought content and cognition    Neurosurgery Physical Exam  AOx3, GCS E4V5M6  Occasional word finding difficulties  CNII-XII: Intact on fine exam, PERRL, visual fields grossly intact, EOMI, facial sensation preserved, no facial asymmetry, tongue midline, shoulder shrug equal, no pronator drift  Extremities: 5/5 motor  throughout, SILT, coordination intact throughout    Significant Labs:  Recent Labs   Lab 11/25/22 0417 11/26/22  0541   * 265*   * 136   K 3.8 3.7    102   CO2 19* 23   BUN 22 22   CREATININE 1.3 0.8   CALCIUM 9.2 8.5*   MG 1.8 1.8       Recent Labs   Lab 11/25/22 0417 11/26/22  0541   WBC 13.88* 13.62*   HGB 14.3 13.5   HCT 43.2 40.5    241       No results for input(s): LABPT, INR, APTT in the last 48 hours.    Microbiology Results (last 7 days)       ** No results found for the last 168 hours. **          All pertinent labs from the last 24 hours have been reviewed.    Significant Diagnostics:  CT: No results found in the last 24 hours.  I have reviewed all pertinent imaging results/findings within the past 24 hours.  Review of Systems

## 2022-11-26 NOTE — HPI
Reason for Consult: Management of newly diagnosed T2DM, Hyperglycemia     Surgical Procedure and Date: n/a    Diabetes diagnosis year: 11/2022 (current admission)    Home Diabetes Medications:  none    How often checking glucose at home?  N/a    BG readings on regimen: n/a  Hypoglycemia on the regimen?  n/a  Missed doses on regimen?  n/a    Diabetes Complications include:     Hyperglycemia    Complicating diabetes co morbidities:   Active Cancer and CAD      HPI:   Patient is a 87 y.o. female with a diagnosis of CAD, GERD, and hypothyroidism presented on 11/21/2022 to OSH with 1 week of disorientation and word finding difficulties. She has occasional episodes of wanting to pass out with associated LLE tingling. CTH at OSH revealed temporal brain mass. She was transferred to [facility] on 11/23/2022 for neurosurgery evaluation for new brain mass. At [facility] CT CAP was obtained and revealed L breast soft tissue mass and pulmonary opacities, and sclerosis of C7. Heme/onc was consulted, they recommend biopsy of L breast mass outpatient. NSGY recommended Keppra and 7-day course of dexamethasone 4mg q6h.    Endocrinology was consulted for hyperglycemia and newly diagnosed DM.    No previous knowledge of DM. She has been experiencing polyuria and polydipsia at home however.  No family hx of diabetes  She has hypothyroidism (for many years per pt) and is on levothyroxine 75 mcg daily. TSH 11/21/2022 at OSH normal. She is taking LT4 correctly.     Lab Results   Component Value Date    HGBA1C 8.2 (H) 11/25/2022

## 2022-11-26 NOTE — SUBJECTIVE & OBJECTIVE
Interval HPI:   Overnight events: Remains on neuro floor. BG above goal ranges on current SQ insulin regimen. Receiving Dex 4 mg q 8 hrs. Diet Cardiac    Eatin%  Nausea: No  Hypoglycemia and intervention: No  Fever: No  TPN and/or TF: No  If yes, type of TF/TPN and rate: n/a    PMH, PSH, FH, SH reviewed     Review of Systems   Constitutional:  Negative for appetite change, chills and fever.   HENT:  Negative for trouble swallowing and voice change.    Eyes:  Negative for pain and visual disturbance.   Respiratory:  Negative for cough and shortness of breath.    Cardiovascular:  Negative for chest pain and palpitations.   Gastrointestinal:  Negative for abdominal distention, abdominal pain, nausea and vomiting.   Endocrine: Positive for polydipsia and polyuria. Negative for cold intolerance and heat intolerance.   Genitourinary:  Negative for difficulty urinating and dysuria.   Musculoskeletal:  Positive for arthralgias (chronic). Negative for myalgias.   Skin:  Negative for wound.   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 Negative for tremors and headaches.   Psychiatric/Behavioral:  Negative for agitation and confusion.      Current Medications and/or Treatments Impacting Glycemic Control  Immunotherapy:    Immunosuppressants       None          Steroids:   Hormones (From admission, onward)      Start     Stop Route Frequency Ordered    22 0331  melatonin tablet 6 mg         -- Oral Nightly PRN 22 0232    22 0000  dexAMETHasone injection 4 mg         -- IV Every 6 hours 22 2247          Pressors:    Autonomic Drugs (From admission, onward)      None          Hyperglycemia/Diabetes Medications:   Antihyperglycemics (From admission, onward)      Start     Stop Route Frequency Ordered    22 1830  insulin aspart U-100 pen 0-5 Units         -- SubQ As needed (PRN) 22 1731    22 1745  insulin aspart U-100 pen 5  Units         -- SubQ 3 times daily with meals 11/25/22 1731    11/25/22 1400  insulin detemir U-100 pen 10 Units         -- SubQ Daily 11/25/22 1358             PHYSICAL EXAMINATION:  Vitals:    11/26/22 0757   BP:    Pulse: 66   Resp:    Temp:      Body mass index is 35.93 kg/m².    Physical Exam  Vitals and nursing note reviewed.   Constitutional:       General: She is not in acute distress.     Appearance: She is obese. She is not ill-appearing.   HENT:      Head: Normocephalic.      Mouth/Throat:      Mouth: Mucous membranes are moist.   Eyes:      Conjunctiva/sclera: Conjunctivae normal.   Cardiovascular:      Rate and Rhythm: Normal rate and regular rhythm.   Pulmonary:      Effort: Pulmonary effort is normal. No respiratory distress.   Abdominal:      General: There is no distension.      Palpations: Abdomen is soft.      Tenderness: There is no abdominal tenderness.   Musculoskeletal:         General: No swelling.      Cervical back: Normal range of motion and neck supple.   Skin:     General: Skin is warm and dry.   Neurological:      Mental Status: She is alert and oriented to person, place, and time.      Comments: Occasional word finding difficulty    Psychiatric:         Mood and Affect: Mood normal.         Behavior: Behavior normal.

## 2022-11-26 NOTE — ASSESSMENT & PLAN NOTE
Acute, likely from steroid injection  Does not have history of diabetes  Blood sugar went into the 400's this morning.  Started moderate sliding scale and consulted endo. I also added levemir 10 units.   Case was discussed with endo.  Endo recommended to add the aspart 5 units with meal, and decrease sliding scale to low.  We will keep Levemir 10 units daily.  Hemoglobin A1c was ordered this morning, but currently still pending.

## 2022-11-26 NOTE — ASSESSMENT & PLAN NOTE
Acute, likely from steroid injection  Does not have history of diabetes prior to admission  HgbA1c came back at 8.2.  Case was discussed with endo today.  Endo will increase levemir from 10 to 12 units, and aspart from 5 to 7 units with meals. Keep sliding scale low.

## 2022-11-26 NOTE — PT/OT/SLP EVAL
Physical Therapy Evaluation    Patient Name:  Candy Oreilly   MRN:  [id number]  Admit Date: 11/23/2022  Admitting Diagnosis:  Brain mass  Length of Stay: 2 days  Recent Surgery: * No surgery found *      Recommendations:     Discharge Recommendations:  nursing facility, skilled (may progress)   Discharge Equipment Recommendations: walker, rolling, shower chair   Barriers to discharge: Decreased caregiver support    Highest Level of Mobility: walked ~18 feet  Assistance Needed: contact guard assistance    Assessment:     Candy Oreilly is a 87 y.o. female admitted with a medical diagnosis of Brain mass. Medical history includes CAD and anxiety. She is most limited by poor balance. Today, she was able to perform bed mobility, standing, transfers, and ambulation. She seems to have poor insight to her deficits at times. Based on clinical presentation and co-morbidities, pt would benefit from acute skilled physical therapy services to improve functional mobility and return to max capacity prior level of function. See detailed evaluation below:    Problem List: impaired endurance, impaired sensation, impaired self care skills, impaired functional mobility, gait instability, impaired balance, decreased safety awareness, decreased coordination  Rehab Prognosis: Good; patient would benefit from acute skilled PT services to address these deficits and reach maximum level of function.      Plan:     During this hospitalization, patient to be seen 3 x/week to address the identified rehab impairments via gait training, therapeutic activities, therapeutic exercises, neuromuscular re-education and progress towards the established goals.    Plan of Care Expires:  12/26/22    Subjective   Communicated with RN prior to session.  Patient found  up in room getting toiletries together  upon PT entry to room, agreeable to evaluation.     Chief Complaint: Abnormal Ct Scan (Reports of Brain Mass on head CT. Was suppose to be  "transferred here on 11/21 but daughter stated would bring her here herself)    Patient/Family Comments/goals: to get better  Pain/Comfort:  Pain Rating 1: 0/10  Pain Rating Post-Intervention 1: 0/10    Living Environment:  Patient lives alone in a 2 story home with no steps to enter. Her bedroom is downstairs. She reports being able to do all her household tasks and stays active inside.     Prior Level of Function:   Patient reports being modified independent with mobility & with ADLs. Patient owns DME as follows: cane, quad, rollator.     Roles/Repsonsibilities:   Work: Retired. Drive: yes. Managing Medicines/Managing Home: yes.     Patient reports they will have assistance from children (reports they have been wanting her to move in with or closer to them) upon discharge.    Objective:   Patient found with: telemetry, peripheral IV     General Precautions: Standard, fall   Orthopedic Precautions:N/A   Braces: N/A   Oxygen Device: Room Air  Vitals: /61 (BP Location: Right arm, Patient Position: Sitting)   Pulse 71   Temp 98.8 °F (37.1 °C) (Oral)   Resp 16   Ht 5' 3" (1.6 m)   Wt 92 kg (202 lb 13.2 oz)   LMP  (LMP Unknown)   SpO2 (!) 91%   BMI 35.93 kg/m²     Exams:  Cognition:   Alert and Cooperative  AxOx4  Command following: Follows two-step commands  Fluency: clear/fluent  Hearing: Intact  Vision:  wears glasses   Skin Integrity: intact  Sensation: impaired to legs - feels like "tinglies"  Coordination: mildly impaired  LE Strength:  L Lower Extremity: WFL  R Lower Extremity: WFL  LE ROM:  L Lower Extremity: WFL  R Lower Extremity: WFL      Outcome Measures:  AM-PAC 6 CLICK MOBILITY  Turning over in bed (including adjusting bedclothes, sheets and blankets)?: 4  Sitting down on and standing up from a chair with arms (e.g., wheelchair, bedside commode, etc.): 4  Moving from lying on back to sitting on the side of the bed?: 4  Moving to and from a bed to a chair (including a wheelchair)?: 3  Need to " walk in hospital room?: 3  Climbing 3-5 steps with a railing?: 2  Basic Mobility Total Score: 20     Functional Mobility:    Sitting Balance at Edge of Bed:  Assistance Level Required: Supervision  Postural deviations noted: rounded shoulders    Transfers:   Sit <> Stand Transfer: stand by assistance with no assistive device   Standing Tolerance: stand by assistance with no assistive device   Deviations: mildly unstable  Bed <> Chair Transfer: Step Transfer technique with stand by assistance with no assistive device    Gait:   Patient ambulated: ~18 feet   Patient required: contact guard  Patient used: no assistive device  Gait Deviation(s): decreased speed, decreased step length, instability, lateral swaying  Patient reaching out for the walls and objects in the room for balance    Education:   Patient was educated on the following:  Role of PT, plan of care, and goals  In room safety and use of call button  Importance of continued upright mobility and exercise      Patient left sitting edge of bed with all lines intact, call button in reach, and RN notified.    GOALS:   Multidisciplinary Problems       Physical Therapy Goals          Problem: Physical Therapy    Goal Priority Disciplines Outcome Goal Variances Interventions   Physical Therapy Goal     PT, PT/OT Ongoing, Progressing     Description: PT goals to be met by: 12/16/22    Patient will perform supine <> sitting with independence.  Patient will perform sit <> stand transitions with supervision using RW.  Patient will perform transfers from bed <> chair or BSC with supervision using RW.  Patient will ambulate 100 feet with supervision using RW.                       History:     Past Medical History:   Diagnosis Date    Anxiety disorder, unspecified     Coronary artery disease     GERD (gastroesophageal reflux disease)     Thyroid disease        Past Surgical History:   Procedure Laterality Date    APPENDECTOMY      CHOLECYSTECTOMY         Time Tracking:      PT Received On: 11/26/22  PT Start Time: 0942     PT Stop Time: 1002  PT Total Time (min): 20 min     Additional staff present: OT - Co-evaluation with OT due to patient complexity and need for two skilled therapists to ensure safe mobilization.    Billable Minutes: Evaluation 10 and Gait Training 10    Kaylyn Spence, PT, DPT  11/26/2022

## 2022-11-27 ENCOUNTER — DOCUMENTATION ONLY (OUTPATIENT)
Dept: NEUROLOGY | Facility: CLINIC | Age: 87
End: 2022-11-27
Payer: MEDICARE

## 2022-11-27 LAB
ALBUMIN SERPL BCP-MCNC: 3.4 G/DL (ref 3.5–5.2)
ALP SERPL-CCNC: 75 U/L (ref 55–135)
ALT SERPL W/O P-5'-P-CCNC: 16 U/L (ref 10–44)
ANION GAP SERPL CALC-SCNC: 12 MMOL/L (ref 8–16)
AST SERPL-CCNC: 9 U/L (ref 10–40)
BASOPHILS # BLD AUTO: 0.03 K/UL (ref 0–0.2)
BASOPHILS NFR BLD: 0.2 % (ref 0–1.9)
BILIRUB SERPL-MCNC: 1.3 MG/DL (ref 0.1–1)
BUN SERPL-MCNC: 22 MG/DL (ref 8–23)
CALCIUM SERPL-MCNC: 8.8 MG/DL (ref 8.7–10.5)
CHLORIDE SERPL-SCNC: 101 MMOL/L (ref 95–110)
CO2 SERPL-SCNC: 20 MMOL/L (ref 23–29)
CREAT SERPL-MCNC: 0.9 MG/DL (ref 0.5–1.4)
DIFFERENTIAL METHOD: ABNORMAL
EOSINOPHIL # BLD AUTO: 0 K/UL (ref 0–0.5)
EOSINOPHIL NFR BLD: 0.1 % (ref 0–8)
ERYTHROCYTE [DISTWIDTH] IN BLOOD BY AUTOMATED COUNT: 13.7 % (ref 11.5–14.5)
EST. GFR  (NO RACE VARIABLE): >60 ML/MIN/1.73 M^2
GLUCOSE SERPL-MCNC: 348 MG/DL (ref 70–110)
HCT VFR BLD AUTO: 44.4 % (ref 37–48.5)
HGB BLD-MCNC: 14.6 G/DL (ref 12–16)
IMM GRANULOCYTES # BLD AUTO: 0.33 K/UL (ref 0–0.04)
IMM GRANULOCYTES NFR BLD AUTO: 2.5 % (ref 0–0.5)
LYMPHOCYTES # BLD AUTO: 2 K/UL (ref 1–4.8)
LYMPHOCYTES NFR BLD: 15.1 % (ref 18–48)
MCH RBC QN AUTO: 28.3 PG (ref 27–31)
MCHC RBC AUTO-ENTMCNC: 32.9 G/DL (ref 32–36)
MCV RBC AUTO: 86 FL (ref 82–98)
MONOCYTES # BLD AUTO: 0.9 K/UL (ref 0.3–1)
MONOCYTES NFR BLD: 6.5 % (ref 4–15)
NEUTROPHILS # BLD AUTO: 10.1 K/UL (ref 1.8–7.7)
NEUTROPHILS NFR BLD: 75.6 % (ref 38–73)
NRBC BLD-RTO: 0 /100 WBC
PLATELET # BLD AUTO: 265 K/UL (ref 150–450)
PMV BLD AUTO: 11.5 FL (ref 9.2–12.9)
POCT GLUCOSE: 241 MG/DL (ref 70–110)
POCT GLUCOSE: 248 MG/DL (ref 70–110)
POCT GLUCOSE: 326 MG/DL (ref 70–110)
POTASSIUM SERPL-SCNC: 3.9 MMOL/L (ref 3.5–5.1)
PROT SERPL-MCNC: 5.7 G/DL (ref 6–8.4)
RBC # BLD AUTO: 5.16 M/UL (ref 4–5.4)
SODIUM SERPL-SCNC: 133 MMOL/L (ref 136–145)
WBC # BLD AUTO: 13.32 K/UL (ref 3.9–12.7)

## 2022-11-27 PROCEDURE — 63600175 PHARM REV CODE 636 W HCPCS

## 2022-11-27 PROCEDURE — 95813 PR EEG, EXTENDED, 61-119 MINS: ICD-10-PCS | Mod: 26,,, | Performed by: PSYCHIATRY & NEUROLOGY

## 2022-11-27 PROCEDURE — 85025 COMPLETE CBC W/AUTO DIFF WBC: CPT | Performed by: HOSPITALIST

## 2022-11-27 PROCEDURE — 99232 PR SUBSEQUENT HOSPITAL CARE,LEVL II: ICD-10-PCS | Mod: GC,,, | Performed by: NEUROLOGICAL SURGERY

## 2022-11-27 PROCEDURE — 11000001 HC ACUTE MED/SURG PRIVATE ROOM

## 2022-11-27 PROCEDURE — 94761 N-INVAS EAR/PLS OXIMETRY MLT: CPT

## 2022-11-27 PROCEDURE — 80053 COMPREHEN METABOLIC PANEL: CPT | Performed by: HOSPITALIST

## 2022-11-27 PROCEDURE — 25000003 PHARM REV CODE 250: Performed by: HOSPITALIST

## 2022-11-27 PROCEDURE — 99232 SBSQ HOSP IP/OBS MODERATE 35: CPT | Mod: ,,, | Performed by: HOSPITALIST

## 2022-11-27 PROCEDURE — 99232 SBSQ HOSP IP/OBS MODERATE 35: CPT | Mod: GC,,, | Performed by: NEUROLOGICAL SURGERY

## 2022-11-27 PROCEDURE — 95813 EEG EXTND MNTR 61-119 MIN: CPT | Mod: 26,,, | Performed by: PSYCHIATRY & NEUROLOGY

## 2022-11-27 PROCEDURE — 36415 COLL VENOUS BLD VENIPUNCTURE: CPT | Performed by: HOSPITALIST

## 2022-11-27 PROCEDURE — 99232 PR SUBSEQUENT HOSPITAL CARE,LEVL II: ICD-10-PCS | Mod: ,,, | Performed by: HOSPITALIST

## 2022-11-27 PROCEDURE — 99222 PR INITIAL HOSPITAL CARE,LEVL II: ICD-10-PCS | Mod: ,,, | Performed by: STUDENT IN AN ORGANIZED HEALTH CARE EDUCATION/TRAINING PROGRAM

## 2022-11-27 PROCEDURE — 25000003 PHARM REV CODE 250: Performed by: INTERNAL MEDICINE

## 2022-11-27 PROCEDURE — 99222 1ST HOSP IP/OBS MODERATE 55: CPT | Mod: ,,, | Performed by: STUDENT IN AN ORGANIZED HEALTH CARE EDUCATION/TRAINING PROGRAM

## 2022-11-27 RX ADMIN — INSULIN ASPART 7 UNITS: 100 INJECTION, SOLUTION INTRAVENOUS; SUBCUTANEOUS at 08:11

## 2022-11-27 RX ADMIN — CARVEDILOL 6.25 MG: 6.25 TABLET, FILM COATED ORAL at 08:11

## 2022-11-27 RX ADMIN — INSULIN ASPART 2 UNITS: 100 INJECTION, SOLUTION INTRAVENOUS; SUBCUTANEOUS at 05:11

## 2022-11-27 RX ADMIN — DEXAMETHASONE SODIUM PHOSPHATE 4 MG: 4 INJECTION INTRA-ARTICULAR; INTRALESIONAL; INTRAMUSCULAR; INTRAVENOUS; SOFT TISSUE at 05:11

## 2022-11-27 RX ADMIN — FLUTICASONE PROPIONATE 100 MCG: 50 SPRAY, METERED NASAL at 08:11

## 2022-11-27 RX ADMIN — PANTOPRAZOLE SODIUM 40 MG: 20 TABLET, DELAYED RELEASE ORAL at 08:11

## 2022-11-27 RX ADMIN — INSULIN ASPART 2 UNITS: 100 INJECTION, SOLUTION INTRAVENOUS; SUBCUTANEOUS at 01:11

## 2022-11-27 RX ADMIN — INSULIN ASPART 5 UNITS: 100 INJECTION, SOLUTION INTRAVENOUS; SUBCUTANEOUS at 08:11

## 2022-11-27 RX ADMIN — DEXAMETHASONE SODIUM PHOSPHATE 4 MG: 4 INJECTION INTRA-ARTICULAR; INTRALESIONAL; INTRAMUSCULAR; INTRAVENOUS; SOFT TISSUE at 12:11

## 2022-11-27 RX ADMIN — LEVETIRACETAM 500 MG: 500 TABLET, FILM COATED ORAL at 08:11

## 2022-11-27 RX ADMIN — DEXAMETHASONE SODIUM PHOSPHATE 4 MG: 4 INJECTION INTRA-ARTICULAR; INTRALESIONAL; INTRAMUSCULAR; INTRAVENOUS; SOFT TISSUE at 01:11

## 2022-11-27 RX ADMIN — INSULIN ASPART 7 UNITS: 100 INJECTION, SOLUTION INTRAVENOUS; SUBCUTANEOUS at 01:11

## 2022-11-27 RX ADMIN — CETIRIZINE HYDROCHLORIDE 5 MG: 5 TABLET, FILM COATED ORAL at 08:11

## 2022-11-27 RX ADMIN — LEVOTHYROXINE SODIUM 75 MCG: 75 TABLET ORAL at 08:11

## 2022-11-27 RX ADMIN — ACETAMINOPHEN 650 MG: 325 TABLET ORAL at 08:11

## 2022-11-27 RX ADMIN — INSULIN ASPART 7 UNITS: 100 INJECTION, SOLUTION INTRAVENOUS; SUBCUTANEOUS at 05:11

## 2022-11-27 RX ADMIN — DEXAMETHASONE SODIUM PHOSPHATE 4 MG: 4 INJECTION INTRA-ARTICULAR; INTRALESIONAL; INTRAMUSCULAR; INTRAVENOUS; SOFT TISSUE at 06:11

## 2022-11-27 NOTE — PROGRESS NOTES
[facility] ([facility])  Neurosurgery  Progress Note    Subjective:     History of Present Illness: Candy Oreilly is a 87 y.o. female with PMH including CAD, GERD, thyroid disease and arthritis who presents with 1 week of disorientation and word finding difficulties. She has occasional episodes of wanting to pass out with associated LLE tingling. Patient went to OSH ED on Monday where they did a CTH and found a brain mass. She presents to the ED at [facility] today to establish care. Patient started taking the ED prescribed keppra and decadron today. 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      Post-Op Info:  * No surgery found *         Interval History: 11/27: NAEON, neuro stable. No seizure like activity overnight. On EEG    Medications:   carvediloL  6.25 mg Oral BID    cetirizine  5 mg Oral Daily    dexAMETHasone  4 mg Intravenous Q6H    fluticasone propionate  2 spray Each Nostril Daily    insulin aspart U-100  7 Units Subcutaneous TIDWM    insulin detemir U-100  15 Units Subcutaneous Daily    levETIRAcetam  500 mg Oral BID    levothyroxine  75 mcg Oral Daily    pantoprazole  40 mg Oral BID     PRN: acetaminophen, albuterol-ipratropium, ALPRAZolam, dextrose 10%, dextrose 10%, glucagon (human recombinant), glucose, glucose, hydrALAZINE, influenza 65up-adj, insulin aspart U-100, melatonin, naloxone, ondansetron, polyethylene glycol, prochlorperazine, simethicone, sodium chloride 0.9%, traZODone    Objective:     Weight: 92 kg (202 lb 13.2 oz)  Body mass index is 35.93 kg/m².  Vital Signs (Most Recent):  Temp: 96.7 °F (35.9 °C) (11/27/22 0856)  Pulse: 72 (11/27/22 0856)  Resp: 16 (11/27/22 0856)  BP: 125/62 (11/27/22 0856)  SpO2: 97 % (11/27/22 0856)   Vital Signs (24h Range):  Temp:  [96.7 °F (35.9 °C)-98.8 °F (37.1 °C)] 96.7 °F (35.9 °C)  Pulse:  [56-80] 72  Resp:  [16-18] 16  SpO2:  [91 %-98 %] 97 %  BP: (122-170)/(58-79) 125/62                             Physical Exam  General: Awake, alert, oriented  Head: Non-traumatic, normocephalic  Eyes: Pupils equal, EOMI  Neck: Supple, normal ROM, no tenderness to palpation  CVS: Normal rate and rhythm  Pulm: Symmetric expansion, no respiratory distress  GI: Abdomen nondistended, nontender  MSK: Moves all extremities without restriction, atraumatic  Skin: Dry, intact  Psych: Normal thought content and cognition    Neurosurgery Physical Exam  AOx3, GCS E4V5M6  Occasional word finding difficulties  CNII-XII: Intact on fine exam, PERRL, visual fields grossly intact, EOMI, facial sensation preserved, no facial asymmetry, tongue midline, shoulder shrug equal, no pronator drift  Extremities: 5/5 motor throughout, SILT, coordination intact throughout    Significant Labs:  Recent Labs   Lab 11/26/22  0541   *      K 3.7      CO2 23   BUN 22   CREATININE 0.8   CALCIUM 8.5*   MG 1.8       Recent Labs   Lab 11/26/22  0541   WBC 13.62*   HGB 13.5   HCT 40.5          No results for input(s): LABPT, INR, APTT in the last 48 hours.    Microbiology Results (last 7 days)       ** No results found for the last 168 hours. **          All pertinent labs from the last 24 hours have been reviewed.    Significant Diagnostics:  CT: No results found in the last 24 hours.  I have reviewed all pertinent imaging results/findings within the past 24 hours.  Review of Systems    Assessment/Plan:     * Brain mass  Candy Oreilly is a 87 y.o. female with PMH including CAD, GERD, thyroid disease and arthritis who presents with 1 week of disorientation and word finding difficulties. 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    --Admit to  for metastatic workup   -q4h neurochecks on floor  --CTH 11/23: grossly stable mass compared to 11/21  --MRI 11/23: enhancing L temporal mass with some surrounding edema, no blood on SWI  --CT chest/abdo/pelvis 11/24: soft tissue mass L breast, opacities in lungs, C7  body sclerosis, b/l calfied plaques (asbestos)   -Heme onc consulted - plan for L breast mass biopsy outpatient    - defer brain biopsy given breast mass more accessibly biopsed  --SBP <160  --Keppra 500 BID  --F/u EEG for possible seizures  --Dex 4q6 x7d, then taper over 2 weeks  --PUD PPx while steroid treatment ongoing  --Continue to monitor clinically, notify NSGY immediately with any changes in neuro status    Dispo: per primary          Roland Almeida MD  Neurosurgery  [facility] ([facility])

## 2022-11-27 NOTE — SUBJECTIVE & OBJECTIVE
Interval History: 11/27: NAEON, neuro stable. No seizure like activity overnight. On EEG    Medications:   carvediloL  6.25 mg Oral BID    cetirizine  5 mg Oral Daily    dexAMETHasone  4 mg Intravenous Q6H    fluticasone propionate  2 spray Each Nostril Daily    insulin aspart U-100  7 Units Subcutaneous TIDWM    insulin detemir U-100  15 Units Subcutaneous Daily    levETIRAcetam  500 mg Oral BID    levothyroxine  75 mcg Oral Daily    pantoprazole  40 mg Oral BID     PRN: acetaminophen, albuterol-ipratropium, ALPRAZolam, dextrose 10%, dextrose 10%, glucagon (human recombinant), glucose, glucose, hydrALAZINE, influenza 65up-adj, insulin aspart U-100, melatonin, naloxone, ondansetron, polyethylene glycol, prochlorperazine, simethicone, sodium chloride 0.9%, traZODone    Objective:     Weight: 92 kg (202 lb 13.2 oz)  Body mass index is 35.93 kg/m².  Vital Signs (Most Recent):  Temp: 96.7 °F (35.9 °C) (11/27/22 0856)  Pulse: 72 (11/27/22 0856)  Resp: 16 (11/27/22 0856)  BP: 125/62 (11/27/22 0856)  SpO2: 97 % (11/27/22 0856)   Vital Signs (24h Range):  Temp:  [96.7 °F (35.9 °C)-98.8 °F (37.1 °C)] 96.7 °F (35.9 °C)  Pulse:  [56-80] 72  Resp:  [16-18] 16  SpO2:  [91 %-98 %] 97 %  BP: (122-170)/(58-79) 125/62                            Physical Exam  General: Awake, alert, oriented  Head: Non-traumatic, normocephalic  Eyes: Pupils equal, EOMI  Neck: Supple, normal ROM, no tenderness to palpation  CVS: Normal rate and rhythm  Pulm: Symmetric expansion, no respiratory distress  GI: Abdomen nondistended, nontender  MSK: Moves all extremities without restriction, atraumatic  Skin: Dry, intact  Psych: Normal thought content and cognition    Neurosurgery Physical Exam  AOx3, GCS E4V5M6  Occasional word finding difficulties  CNII-XII: Intact on fine exam, PERRL, visual fields grossly intact, EOMI, facial sensation preserved, no facial asymmetry, tongue midline, shoulder shrug equal, no pronator drift  Extremities: 5/5 motor  throughout, SILT, coordination intact throughout    Significant Labs:  Recent Labs   Lab 11/26/22  0541   *      K 3.7      CO2 23   BUN 22   CREATININE 0.8   CALCIUM 8.5*   MG 1.8       Recent Labs   Lab 11/26/22  0541   WBC 13.62*   HGB 13.5   HCT 40.5          No results for input(s): LABPT, INR, APTT in the last 48 hours.    Microbiology Results (last 7 days)       ** No results found for the last 168 hours. **          All pertinent labs from the last 24 hours have been reviewed.    Significant Diagnostics:  CT: No results found in the last 24 hours.  I have reviewed all pertinent imaging results/findings within the past 24 hours.  Review of Systems

## 2022-11-27 NOTE — PROCEDURES
EEG REPORT      Candy Oreilly  [id number]  1935    DATE OF SERVICE: 11/27/2022     -2    METHODOLOGY      Extended electroencephalographic recording is made while the patient is ambulatory and continuing normal daily activities.  Electrodes are placed according to the International 10-20 placement system and included T1 and T2 electrode placement.  Twenty four (24) channels of digital signal (sampling rate of 512/sec) was simultaneously recorded from the scalp including EKG and eye monitors.  Recording band pass was 0.1 to 100 hz and all data was stored digitally on the recorder.  The patient is instructed to press an event button when clinical symptoms occur and write the symptoms into a diary. Activation procedures which include photic stimulation, hyperventilation and instructing patients to perform simple task are done in selected patients.        The EEG is displayed on a monitor screen and can be reformatted into different montages for evaluation.  The entire recoding is submitted for computer assisted analysis to detect spike and electrographic seizure activity.  The entire recording is visually reviewed and the times identified by computer analysis as being spikes or seizures are reviewed again.  Compresses spectral analysis (CSA) is also performed on the activity recorded from each individual channel.  This is displayed as a power display of frequencies from 0 to 30 Hz over time.   The CSA analysis is done and displayed continuously.  This is reviewed for asymmetries in power between homologous areas of the scalp and for presence of changes in power which canbe seen when seizures occur.  Sections of suspected abnormalities on the CSA is then compared with the original EEG recording.  .     [employer] software was also utilized in the review of this study.  This software suite analyzes the EEG recording in multiple domains.  Coherence and rhythmicity is computed to identify EEG sections which  may contain organized seizures.  Each channel undergoes analysis to detect presence of spike and sharp waves which have special and morphological characteristic of epileptic activity.  The routine EEG recording is converted from spacial into frequency domain.  This is then displayed comparing homologous areas to identify areas of significant asymmetry.  Algorithm to identify non-cortically generated artifact is used to separate eye movement, EMG and other artifact from the EEG     Recording Times    A total of 1:45:48 hours of EEG was recorded.      EEG FINDINGS:  Background activity:   The background rhythm was characterized by alpha and anterior dominant beta activity with a 10Hz posterior dominant alpha rhythm at 30-70 microvolts.   Symmetry and continuity: the background was continuous and symmetric     Sleep:   Normal sleep transients including sleep spindles, K complexes, vertex waves and POSTS were seen.    Activation procedures:   Answers questions correctly    Abnormal activity:   No epileptiform discharges, periodic discharges, lateralized rhythmic delta activity or electrographic seizures were seen.    IMPRESSION:   Normal continuous video EEG      Jamar Flores MD  Neurology-Epilepsy.  [facility].

## 2022-11-27 NOTE — PLAN OF CARE
Endocrinology plan of care  11/27/2022    -A1C   Lab Results   Component Value Date    HGBA1C 8.2 (H) 11/25/2022     -HOME REGIMEN: None    -INPATIENT REGIMEN:    - Levemir 12u qd  - Aspart 7u AC + low dose correction    -GLUCOSE TREND FOR THE PAST 24HRS: 240s-340s; although mostly 200s later in the day yesterday after my initial insulin adjustment.    -Glucose Goal 140-180mg/dL     -NO HYPOGYCEMIAS NOTED     -TOLERATING 100% OF PO DIET     -NPO?: No    -Steroids - Yes; Dexamethasone 4mg q6h     -Tube Feeds - No    Plan:   - Global hyperglycemia without significant prandial spikes in BG - suggesting insufficient basal. Due to this and already aggressive prandial dose, will increase levemir only for now.  - Increased Levemir to 15u qd this morning   - Continue Aspart 7u AC + low dose correction  - Accuchecks ac/hs  - DM diet    - Hypoglycemia protocol in place  - If blood glucose greater than 300, please ask patient not to eat food or drink anything other than water until correctional insulin has brought it back below 250     ** Please notify Endocrine for any change and/or advance in diet**  ** Please call Endocrine for any BG related issues **       Discharge planning:  - Will need all testing supplies prescribed at d/c. Additionally, pt should begin to learn how to check BG - can ask RN to allow pt to perform supervised accuchecks  - Will determine d/c recs based on insulin dose requirement and plan for steroids - as she may not need insulin at d/c if she will not continue on steroids as her a1c is 8, which is only slightly above her goal of <8 given her age and co-morbidities     **Please notify endocrinology of discharge plan for final recs**      Rowena Posey MD  [facility], [address]  [address] [place], [place], [address]    Office: [phone]  Fax: [phone]

## 2022-11-27 NOTE — ASSESSMENT & PLAN NOTE
Candy Oreilly is a 87 y.o. female with PMH including CAD, GERD, thyroid disease and arthritis who presents with 1 week of disorientation and word finding difficulties. 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    --Admit to  for metastatic workup   -q4h neurochecks on floor  --CTH 11/23: grossly stable mass compared to 11/21  --MRI 11/23: enhancing L temporal mass with some surrounding edema, no blood on SWI  --CT chest/abdo/pelvis 11/24: soft tissue mass L breast, opacities in lungs, C7 body sclerosis, b/l calfied plaques (asbestos)   -Heme onc consulted - plan for L breast mass biopsy outpatient    - defer brain biopsy given breast mass more accessibly biopsed  --SBP <160  --Keppra 500 BID  --F/u EEG for possible seizures  --Dex 4q6 x7d, then taper over 2 weeks  --PUD PPx while steroid treatment ongoing  --Continue to monitor clinically, notify NSGY immediately with any changes in neuro status    Dispo: per primary

## 2022-11-27 NOTE — CONSULTS
Radiation Oncology Inpatient Consult Note                                                                                                                                 Date of Service: 11/27/2022     Chief Complaint: Left temporal lesion on brain imaging and neurological symptoms     Reason for consult: Consideration for radiation therapy to the brain     Implantable devices: no electronics (has left knee prosthetics)     Therapy to Date:  No radiation        Diagnosis/Assessment:   Candy Oreilly is a 87 y.o. woman from [facility], recently transferred from [facility] after presenting with lightheadedness, confusion and word finding difficulty, found to have a 2.1 cm left medial temporal lobe lesion with edema, local mass effect, effacement of the left temporal horn, without midline shift or herniation, with resolved edema and symptoms on steroids. Workup has shown a 2.5 cm soft tissue mass in the left breast, and an indeterminate focus of sclerosis in the C7 VB    PMG CAD, GERD and thyroid disease     On steroids and Keppra, back to baseline neurologically     Radiation oncology consulted for radiation to management of the brain lesion     Plan/Recommendations:      - steroids and keppra per neuro team  - pending mammogram and biopsy of left breast  - no urgent need to radiation at this time  - recommend review at CNS tumor board for brain lesion management  - please notify when pathology is available      HPI:   Candy Oreilly is a 87 y.o. woman from [facility], recently transferred from [facility] after presenting with a left medial temporal lobe lesion on CT of the head in setting of neurological symptoms:     Oncologic history:  11/21/2022 presented to [facility] with brief episodes of lightheadedness, confusion and word finding difficulty: Rx Keppra and Dex    11/21/2022 CT head with contrast showed a 1.9 cm left medial temporal lobe lesion with surrounding edema,  local mass effect, effacement of the left temporal horn, without midline shift or herniation      11/23/2022 [place] [facility] [place] by neurosurgery    11/23/2022 MRI brain w wo contrast showed a 2.1cm enhancing lesion in the medial left temporal lobe, with minimal surrounding vasogenic and no mass effect.      11/24/2022 CT CAP showed a 1.6 x 2.5 cm soft tissue mass in the left breast and some solid, ground-glass opacities in the lungs up to 0.6 cm and an indeterminate small focus of sclerosis in the anterior C7 vertebral body              Subjective:   I saw patient at bedside, she was alone, and AAO x3.  She denied any focal neurological deficits motor or sensory or any headaches with exception of pressure like sensation behind her eyes. Denies any word finding difficulty.  She stated that prior to admission, she was living alone, independently, and was very strong, very active maintaining her yard and house.    She denies other major complaints     The patient denies any history of radiation therapy, implantable cardiac devices, or connective tissue disease.       Social history:  She lives in [place], [place].  She is a , her daughter lives in [place] and her son lives in [place]    Family History   Problem Relation Age of Onset    Thyroid cancer Mother     Lung cancer Father     Colon cancer Father     Breast cancer Sister     Cancer Brother          No current facility-administered medications on file prior to encounter.     Current Outpatient Medications on File Prior to Encounter   Medication Sig Dispense Refill    ALPRAZolam (XANAX) 0.5 MG tablet Take 0.5 mg by mouth daily as needed.      atenoloL (TENORMIN) 50 MG tablet Take 1 tablet by mouth once daily.      celecoxib (CELEBREX) 200 MG capsule Take 1 capsule by mouth once daily.      citalopram (CELEXA) 20 MG tablet Take 1 tablet by mouth once daily.      dexAMETHasone (DECADRON) 2 MG tablet Take 2 tablets (4 mg total) by mouth  daily with breakfast. for 10 days 20 tablet 0    fexofenadine (ALLEGRA) 180 MG tablet Take 1 tablet by mouth once daily.      fluticasone propionate (FLONASE) 50 mcg/actuation nasal spray 1 spray by Each Nostril route once daily.      hydroCHLOROthiazide (HYDRODIURIL) 25 MG tablet Take 0.5 tablets by mouth once daily.      levETIRAcetam (KEPPRA) 500 MG Tab Take 1 tablet (500 mg total) by mouth 2 (two) times daily. 60 tablet 0    levothyroxine (SYNTHROID) 75 MCG tablet Take 1 tablet by mouth once daily.      pantoprazole (PROTONIX) 40 MG tablet Take 1 tablet by mouth once daily.         Review of patient's allergies indicates:   Allergen Reactions    Pregabalin Swelling    Atorvastatin Other (See Comments)     Muscle weakness      Dexlansoprazole Other (See Comments)     Other reaction(s): HAND SPASMS  Muscle weakness      Ezetimibe Other (See Comments)     Other reaction(s): MUSCLE PAIN  Muscle pain      Gabapentin Other (See Comments)     Other reaction(s): MAKES HER DIZZY  Dizziness      Onabotulinumtoxina      Medical botox    Oxybutynin Other (See Comments)     Other reaction(s): CANT PEE  Stopped urine flow      Rosuvastatin Other (See Comments)     Leg muscle pain      Sertraline Other (See Comments)     Shaking of hands      Meperidine Nausea Only       Past Surgical History:   Procedure Laterality Date    APPENDECTOMY      CHOLECYSTECTOMY         Past Medical History:   Diagnosis Date    Anxiety disorder, unspecified     Coronary artery disease     GERD (gastroesophageal reflux disease)     Thyroid disease        Review of Systems  Negative unless as above        Objective:      Physical Exam  Vitals reviewed.     Constitutional:       Appearance: Normal appearance.  Lying in bed NAD  HENT:      Head: NC/AT  Eyes:      Conjunctiva/sclera: Conjunctivae normal.   Cardiovascular:      Comments: extremities well perfused  Pulmonary:      Effort: Pulmonary effort is normal.   Abdominal:      General: There is no  distension.   Musculoskeletal:         General: Normal range of motion.      Cervical back: Normal range of motion.   Neurological:      General: No focal deficit present.      Mental Status: Alert and oriented to person, place, and time.      CN 2-12 grossly intact; body sensation grossly intact; UE and LE strength 5/5  Psychiatric:         Mood and Affect: Mood normal.         Behavior: Behavior normal.      Imaging: I have personally reviewed the patient's available images and reports and summarized pertinent findings above in HPI.      No pathology available     Thank you for the opportunity to care for this patient. Please do not hesitate to contact me with any questions.     Willie Mandujano MD/PhD

## 2022-11-27 NOTE — PROCEDURES
EEG REPORT      Candy Oreilly  [id number]  1935    DATE OF SERVICE: 11/26/2022         METHODOLOGY      Extended electroencephalographic recording is made while the patient is ambulatory and continuing normal daily activities.  Electrodes are placed according to the International 10-20 placement system and included T1 and T2 electrode placement.  Twenty four (24) channels of digital signal (sampling rate of 512/sec) was simultaneously recorded from the scalp including EKG and eye monitors.  Recording band pass was 0.1 to 100 hz and all data was stored digitally on the recorder.  The patient is instructed to press an event button when clinical symptoms occur and write the symptoms into a diary. Activation procedures which include photic stimulation, hyperventilation and instructing patients to perform simple task are done in selected patients.        The EEG is displayed on a monitor screen and can be reformatted into different montages for evaluation.  The entire recoding is submitted for computer assisted analysis to detect spike and electrographic seizure activity.  The entire recording is visually reviewed and the times identified by computer analysis as being spikes or seizures are reviewed again.  Compresses spectral analysis (CSA) is also performed on the activity recorded from each individual channel.  This is displayed as a power display of frequencies from 0 to 30 Hz over time.   The CSA analysis is done and displayed continuously.  This is reviewed for asymmetries in power between homologous areas of the scalp and for presence of changes in power which canbe seen when seizures occur.  Sections of suspected abnormalities on the CSA is then compared with the original EEG recording.  .     [employer] Neuromedical software was also utilized in the review of this study.  This software suite analyzes the EEG recording in multiple domains.  Coherence and rhythmicity is computed to identify EEG sections which may  contain organized seizures.  Each channel undergoes analysis to detect presence of spike and sharp waves which have special and morphological characteristic of epileptic activity.  The routine EEG recording is converted from spacial into frequency domain.  This is then displayed comparing homologous areas to identify areas of significant asymmetry.  Algorithm to identify non-cortically generated artifact is used to separate eye movement, EMG and other artifact from the EEG     Recording Times  Start on 11/26/2022  Stop on 11/27/2022    A total of 18:06:04 hours of EEG was recorded.      EEG FINDINGS:  Background activity:   The background rhythm was characterized by alpha and anterior dominant beta activity with a 10Hz posterior dominant alpha rhythm at 30-70 microvolts.   Symmetry and continuity: the background was continuous and symmetric     Sleep:   Normal sleep transients including sleep spindles, K complexes, vertex waves and POSTS were seen.    Activation procedures:   Answers questions correctly    Abnormal activity:   No epileptiform discharges, periodic discharges, lateralized rhythmic delta activity or electrographic seizures were seen.    IMPRESSION:   Normal one day video EEG      Jamar Flores MD  Neurology-Epilepsy.  [facility].

## 2022-11-28 ENCOUNTER — TELEPHONE (OUTPATIENT)
Dept: NEUROSURGERY | Facility: CLINIC | Age: 87
End: 2022-11-28
Payer: MEDICARE

## 2022-11-28 PROBLEM — H53.8 BLURRY VISION, LEFT EYE: Status: ACTIVE | Noted: 2022-11-28

## 2022-11-28 LAB
ALBUMIN SERPL BCP-MCNC: 3.1 G/DL (ref 3.5–5.2)
ALP SERPL-CCNC: 67 U/L (ref 55–135)
ALT SERPL W/O P-5'-P-CCNC: 16 U/L (ref 10–44)
ANION GAP SERPL CALC-SCNC: 10 MMOL/L (ref 8–16)
AST SERPL-CCNC: 10 U/L (ref 10–40)
BASOPHILS # BLD AUTO: 0.04 K/UL (ref 0–0.2)
BASOPHILS NFR BLD: 0.4 % (ref 0–1.9)
BILIRUB SERPL-MCNC: 1 MG/DL (ref 0.1–1)
BUN SERPL-MCNC: 21 MG/DL (ref 8–23)
CALCIUM SERPL-MCNC: 8.6 MG/DL (ref 8.7–10.5)
CANCER AG27-29 SERPL-ACNC: <12 U/ML
CHLORIDE SERPL-SCNC: 101 MMOL/L (ref 95–110)
CO2 SERPL-SCNC: 22 MMOL/L (ref 23–29)
CREAT SERPL-MCNC: 0.8 MG/DL (ref 0.5–1.4)
DIFFERENTIAL METHOD: ABNORMAL
EOSINOPHIL # BLD AUTO: 0 K/UL (ref 0–0.5)
EOSINOPHIL NFR BLD: 0.1 % (ref 0–8)
ERYTHROCYTE [DISTWIDTH] IN BLOOD BY AUTOMATED COUNT: 13.7 % (ref 11.5–14.5)
EST. GFR  (NO RACE VARIABLE): >60 ML/MIN/1.73 M^2
GLUCOSE SERPL-MCNC: 292 MG/DL (ref 70–110)
HCT VFR BLD AUTO: 44.3 % (ref 37–48.5)
HGB BLD-MCNC: 14.4 G/DL (ref 12–16)
IMM GRANULOCYTES # BLD AUTO: 0.25 K/UL (ref 0–0.04)
IMM GRANULOCYTES NFR BLD AUTO: 2.2 % (ref 0–0.5)
LYMPHOCYTES # BLD AUTO: 1.6 K/UL (ref 1–4.8)
LYMPHOCYTES NFR BLD: 14.3 % (ref 18–48)
MCH RBC QN AUTO: 28.6 PG (ref 27–31)
MCHC RBC AUTO-ENTMCNC: 32.5 G/DL (ref 32–36)
MCV RBC AUTO: 88 FL (ref 82–98)
MONOCYTES # BLD AUTO: 0.6 K/UL (ref 0.3–1)
MONOCYTES NFR BLD: 4.8 % (ref 4–15)
NEUTROPHILS # BLD AUTO: 8.9 K/UL (ref 1.8–7.7)
NEUTROPHILS NFR BLD: 78.2 % (ref 38–73)
NRBC BLD-RTO: 0 /100 WBC
PLATELET # BLD AUTO: 221 K/UL (ref 150–450)
PMV BLD AUTO: 11.8 FL (ref 9.2–12.9)
POCT GLUCOSE: 259 MG/DL (ref 70–110)
POCT GLUCOSE: 259 MG/DL (ref 70–110)
POCT GLUCOSE: 274 MG/DL (ref 70–110)
POTASSIUM SERPL-SCNC: 4 MMOL/L (ref 3.5–5.1)
PROT SERPL-MCNC: 5.4 G/DL (ref 6–8.4)
RBC # BLD AUTO: 5.04 M/UL (ref 4–5.4)
SODIUM SERPL-SCNC: 133 MMOL/L (ref 136–145)
WBC # BLD AUTO: 11.36 K/UL (ref 3.9–12.7)

## 2022-11-28 PROCEDURE — 88341 PR IHC OR ICC EACH ADD'L SINGLE ANTIBODY  STAINPR: ICD-10-PCS | Mod: 26,,, | Performed by: STUDENT IN AN ORGANIZED HEALTH CARE EDUCATION/TRAINING PROGRAM

## 2022-11-28 PROCEDURE — 63600175 PHARM REV CODE 636 W HCPCS

## 2022-11-28 PROCEDURE — 94761 N-INVAS EAR/PLS OXIMETRY MLT: CPT

## 2022-11-28 PROCEDURE — 88342 CHG IMMUNOCYTOCHEMISTRY: ICD-10-PCS | Mod: 26,,, | Performed by: STUDENT IN AN ORGANIZED HEALTH CARE EDUCATION/TRAINING PROGRAM

## 2022-11-28 PROCEDURE — 88341 IMHCHEM/IMCYTCHM EA ADD ANTB: CPT | Mod: 59 | Performed by: STUDENT IN AN ORGANIZED HEALTH CARE EDUCATION/TRAINING PROGRAM

## 2022-11-28 PROCEDURE — 99233 SBSQ HOSP IP/OBS HIGH 50: CPT | Mod: ,,, | Performed by: HOSPITALIST

## 2022-11-28 PROCEDURE — 11000001 HC ACUTE MED/SURG PRIVATE ROOM

## 2022-11-28 PROCEDURE — 88341 IMHCHEM/IMCYTCHM EA ADD ANTB: CPT | Mod: 26,,, | Performed by: STUDENT IN AN ORGANIZED HEALTH CARE EDUCATION/TRAINING PROGRAM

## 2022-11-28 PROCEDURE — 25000003 PHARM REV CODE 250: Performed by: HOSPITALIST

## 2022-11-28 PROCEDURE — 88305 TISSUE EXAM BY PATHOLOGIST: CPT | Mod: 26,,, | Performed by: STUDENT IN AN ORGANIZED HEALTH CARE EDUCATION/TRAINING PROGRAM

## 2022-11-28 PROCEDURE — 88305 TISSUE EXAM BY PATHOLOGIST: ICD-10-PCS | Mod: 26,,, | Performed by: STUDENT IN AN ORGANIZED HEALTH CARE EDUCATION/TRAINING PROGRAM

## 2022-11-28 PROCEDURE — 88342 IMHCHEM/IMCYTCHM 1ST ANTB: CPT | Performed by: STUDENT IN AN ORGANIZED HEALTH CARE EDUCATION/TRAINING PROGRAM

## 2022-11-28 PROCEDURE — 85025 COMPLETE CBC W/AUTO DIFF WBC: CPT | Performed by: HOSPITALIST

## 2022-11-28 PROCEDURE — 25000003 PHARM REV CODE 250: Performed by: INTERNAL MEDICINE

## 2022-11-28 PROCEDURE — 88305 TISSUE EXAM BY PATHOLOGIST: CPT | Performed by: STUDENT IN AN ORGANIZED HEALTH CARE EDUCATION/TRAINING PROGRAM

## 2022-11-28 PROCEDURE — 80053 COMPREHEN METABOLIC PANEL: CPT | Performed by: HOSPITALIST

## 2022-11-28 PROCEDURE — 36415 COLL VENOUS BLD VENIPUNCTURE: CPT | Performed by: HOSPITALIST

## 2022-11-28 PROCEDURE — 88377 M/PHMTRC ALYS ISHQUANT/SEMIQ: CPT | Performed by: STUDENT IN AN ORGANIZED HEALTH CARE EDUCATION/TRAINING PROGRAM

## 2022-11-28 PROCEDURE — 88342 IMHCHEM/IMCYTCHM 1ST ANTB: CPT | Mod: 26,,, | Performed by: STUDENT IN AN ORGANIZED HEALTH CARE EDUCATION/TRAINING PROGRAM

## 2022-11-28 PROCEDURE — 99233 PR SUBSEQUENT HOSPITAL CARE,LEVL III: ICD-10-PCS | Mod: ,,, | Performed by: HOSPITALIST

## 2022-11-28 RX ORDER — INSULIN ASPART 100 [IU]/ML
0-5 INJECTION, SOLUTION INTRAVENOUS; SUBCUTANEOUS
Status: DISCONTINUED | OUTPATIENT
Start: 2022-11-28 | End: 2022-12-01 | Stop reason: HOSPADM

## 2022-11-28 RX ORDER — INSULIN ASPART 100 [IU]/ML
9 INJECTION, SOLUTION INTRAVENOUS; SUBCUTANEOUS
Status: DISCONTINUED | OUTPATIENT
Start: 2022-11-28 | End: 2022-11-30

## 2022-11-28 RX ORDER — LIDOCAINE HYDROCHLORIDE 10 MG/ML
3 INJECTION, SOLUTION EPIDURAL; INFILTRATION; INTRACAUDAL; PERINEURAL ONCE
Status: COMPLETED | OUTPATIENT
Start: 2022-11-28 | End: 2022-11-28

## 2022-11-28 RX ORDER — AMLODIPINE BESYLATE 5 MG/1
5 TABLET ORAL DAILY
Status: DISCONTINUED | OUTPATIENT
Start: 2022-11-28 | End: 2022-11-29

## 2022-11-28 RX ORDER — BUPIVACAINE HYDROCHLORIDE 2.5 MG/ML
10 INJECTION, SOLUTION EPIDURAL; INFILTRATION; INTRACAUDAL ONCE
Status: COMPLETED | OUTPATIENT
Start: 2022-11-28 | End: 2022-11-28

## 2022-11-28 RX ADMIN — FLUTICASONE PROPIONATE 100 MCG: 50 SPRAY, METERED NASAL at 08:11

## 2022-11-28 RX ADMIN — INSULIN ASPART 7 UNITS: 100 INJECTION, SOLUTION INTRAVENOUS; SUBCUTANEOUS at 08:11

## 2022-11-28 RX ADMIN — LEVOTHYROXINE SODIUM 75 MCG: 75 TABLET ORAL at 08:11

## 2022-11-28 RX ADMIN — HYDRALAZINE HYDROCHLORIDE 25 MG: 25 TABLET, FILM COATED ORAL at 07:11

## 2022-11-28 RX ADMIN — LEVETIRACETAM 500 MG: 500 TABLET, FILM COATED ORAL at 09:11

## 2022-11-28 RX ADMIN — CARVEDILOL 6.25 MG: 6.25 TABLET, FILM COATED ORAL at 09:11

## 2022-11-28 RX ADMIN — DEXAMETHASONE SODIUM PHOSPHATE 4 MG: 4 INJECTION INTRA-ARTICULAR; INTRALESIONAL; INTRAMUSCULAR; INTRAVENOUS; SOFT TISSUE at 01:11

## 2022-11-28 RX ADMIN — DEXAMETHASONE SODIUM PHOSPHATE 4 MG: 4 INJECTION INTRA-ARTICULAR; INTRALESIONAL; INTRAMUSCULAR; INTRAVENOUS; SOFT TISSUE at 06:11

## 2022-11-28 RX ADMIN — BUPIVACAINE HYDROCHLORIDE 10 ML: 2.5 INJECTION, SOLUTION EPIDURAL; INFILTRATION; INTRACAUDAL; PERINEURAL at 04:11

## 2022-11-28 RX ADMIN — ACETAMINOPHEN 650 MG: 325 TABLET ORAL at 06:11

## 2022-11-28 RX ADMIN — PANTOPRAZOLE SODIUM 40 MG: 20 TABLET, DELAYED RELEASE ORAL at 09:11

## 2022-11-28 RX ADMIN — CARVEDILOL 6.25 MG: 6.25 TABLET, FILM COATED ORAL at 08:11

## 2022-11-28 RX ADMIN — CETIRIZINE HYDROCHLORIDE 5 MG: 5 TABLET, FILM COATED ORAL at 08:11

## 2022-11-28 RX ADMIN — LEVETIRACETAM 500 MG: 500 TABLET, FILM COATED ORAL at 08:11

## 2022-11-28 RX ADMIN — INSULIN ASPART 9 UNITS: 100 INJECTION, SOLUTION INTRAVENOUS; SUBCUTANEOUS at 06:11

## 2022-11-28 RX ADMIN — DEXAMETHASONE SODIUM PHOSPHATE 4 MG: 4 INJECTION INTRA-ARTICULAR; INTRALESIONAL; INTRAMUSCULAR; INTRAVENOUS; SOFT TISSUE at 12:11

## 2022-11-28 RX ADMIN — PANTOPRAZOLE SODIUM 40 MG: 20 TABLET, DELAYED RELEASE ORAL at 08:11

## 2022-11-28 RX ADMIN — INSULIN ASPART 3 UNITS: 100 INJECTION, SOLUTION INTRAVENOUS; SUBCUTANEOUS at 08:11

## 2022-11-28 RX ADMIN — INSULIN ASPART 1 UNITS: 100 INJECTION, SOLUTION INTRAVENOUS; SUBCUTANEOUS at 09:11

## 2022-11-28 RX ADMIN — DEXAMETHASONE SODIUM PHOSPHATE 4 MG: 4 INJECTION INTRA-ARTICULAR; INTRALESIONAL; INTRAMUSCULAR; INTRAVENOUS; SOFT TISSUE at 08:11

## 2022-11-28 RX ADMIN — AMLODIPINE BESYLATE 5 MG: 5 TABLET ORAL at 08:11

## 2022-11-28 RX ADMIN — LIDOCAINE HYDROCHLORIDE 3 ML: 10 INJECTION, SOLUTION EPIDURAL; INFILTRATION; INTRACAUDAL; PERINEURAL at 04:11

## 2022-11-28 NOTE — PROGRESS NOTES
[facility] ([facility])  Neurosurgery  Progress Note    Subjective:     History of Present Illness: Candy Oreilly is a 87 y.o. female with PMH including CAD, GERD, thyroid disease and arthritis who presents with 1 week of disorientation and word finding difficulties. She has occasional episodes of wanting to pass out with associated LLE tingling. Patient went to [facility] ED on Monday where they did a CTH and found a brain mass. [place] [facility] [place]. Patient started taking the ED prescribed keppra and decadron today. 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      Post-Op Info:  * No surgery found *         Interval History: 11/28: NAEON. Exam stable. No seizures recorded on 24 hour EEG from 11/26 to 11/27.    Medications:   amLODIPine  5 mg Oral Daily    carvediloL  6.25 mg Oral BID    cetirizine  5 mg Oral Daily    dexAMETHasone  4 mg Intravenous Q6H    fluticasone propionate  2 spray Each Nostril Daily    insulin aspart U-100  9 Units Subcutaneous TIDWM    insulin detemir U-100  15 Units Subcutaneous Daily    levETIRAcetam  500 mg Oral BID    levothyroxine  75 mcg Oral Daily    pantoprazole  40 mg Oral BID     PRN: acetaminophen, albuterol-ipratropium, ALPRAZolam, dextrose 10%, dextrose 10%, glucagon (human recombinant), glucose, glucose, hydrALAZINE, influenza 65up-adj, insulin aspart U-100, melatonin, naloxone, ondansetron, polyethylene glycol, prochlorperazine, simethicone, sodium chloride 0.9%, traZODone    Objective:     Weight: 92 kg (202 lb 13.2 oz)  Body mass index is 35.93 kg/m².  Vital Signs (Most Recent):  Temp: 98.3 °F (36.8 °C) (11/28/22 1143)  Pulse: 81 (11/28/22 1143)  Resp: 16 (11/28/22 1143)  BP: (!) 142/65 (11/28/22 1143)  SpO2: (!) 93 % (11/28/22 1143)   Vital Signs (24h Range):  Temp:  [97.5 °F (36.4 °C)-98.3 °F (36.8 °C)] 98.3 °F (36.8 °C)  Pulse:  [] 81  Resp:  [16-20] 16  SpO2:  [93 %-97 %] 93 %  BP:  (142-168)/(65-78) 142/65                            Physical Exam  General: Awake, alert, oriented  Head: Non-traumatic, normocephalic  Eyes: Pupils equal, EOMI  Neck: Supple, normal ROM, no tenderness to palpation  CVS: Normal rate and rhythm  Pulm: Symmetric expansion, no respiratory distress  GI: Abdomen nondistended, nontender  MSK: Moves all extremities without restriction, atraumatic  Skin: Dry, intact  Psych: Normal thought content and cognition    Neurosurgery Physical Exam  AOx3, E4V5M6  Occasional word finding difficulties  CNII-XII: Intact on fine exam, PERRL, visual fields grossly intact, EOMI, facial sensation preserved, no facial asymmetry, tongue midline, shoulder shrug equal, no pronator drift  Extremities: 5/5 motor throughout, SILT, coordination intact throughout    Significant Labs:  Recent Labs   Lab 11/27/22  0857 11/28/22  0614   * 292*   * 133*   K 3.9 4.0    101   CO2 20* 22*   BUN 22 21   CREATININE 0.9 0.8   CALCIUM 8.8 8.6*       Recent Labs   Lab 11/27/22  0857 11/28/22  0614   WBC 13.32* 11.36   HGB 14.6 14.4   HCT 44.4 44.3    221       No results for input(s): LABPT, INR, APTT in the last 48 hours.    Microbiology Results (last 7 days)       ** No results found for the last 168 hours. **          All pertinent labs from the last 24 hours have been reviewed.    Significant Diagnostics:  CT: No results found in the last 24 hours.  I have reviewed all pertinent imaging results/findings within the past 24 hours.  Review of Systems  Assessment/Plan:     * Brain mass  Candy Oreilly is a 87 y.o. female with PMH including CAD, GERD, thyroid disease and arthritis who presents with 1 week of disorientation and word finding difficulties. NSGY consulted for possible L temporal brain mass.    --Admit to  for metastatic workup   -q4h neurochecks on floor  --CTH 11/23: grossly stable mass compared to 11/21  --MRI 11/23: enhancing L temporal mass with some  surrounding edema, no blood on SWI  --CT chest/abdo/pelvis 11/24: soft tissue mass L breast, opacities in lungs, C7 body sclerosis, b/l calfied plaques (asbestos)  --Heme onc consulted - plan for L breast mass biopsy outpatient   - defer brain biopsy given breast mass more accessibly biopsed  --Rad onc consulted - no urgent need for radiation at this time, recommend review at CNS tumor board  --SBP <160  --Keppra 500 BID  --F/u EEG for possible seizures - no sz recorded  --Dex 4q6 x7d, then taper over 2 weeks  --PUD PPx while steroid treatment ongoing  --NSGY will sign off - follow up in clinic in 4-6 weeks or when breast biopsy results have returned    Dispo: per primary        Aliyah Dueñas MD  Neurosurgery  [facility] ([facility])

## 2022-11-28 NOTE — ASSESSMENT & PLAN NOTE
continue coreg 6.25 mg bid. If bp continued to trend up, will increase coreg.   Continue hydralazine prn for sbp >160.

## 2022-11-28 NOTE — SUBJECTIVE & OBJECTIVE
Interval History: 11/28: NAEON. Exam stable. No seizures recorded on 24 hour EEG from 11/26 to 11/27.    Medications:   amLODIPine  5 mg Oral Daily    carvediloL  6.25 mg Oral BID    cetirizine  5 mg Oral Daily    dexAMETHasone  4 mg Intravenous Q6H    fluticasone propionate  2 spray Each Nostril Daily    insulin aspart U-100  9 Units Subcutaneous TIDWM    insulin detemir U-100  15 Units Subcutaneous Daily    levETIRAcetam  500 mg Oral BID    levothyroxine  75 mcg Oral Daily    pantoprazole  40 mg Oral BID     PRN: acetaminophen, albuterol-ipratropium, ALPRAZolam, dextrose 10%, dextrose 10%, glucagon (human recombinant), glucose, glucose, hydrALAZINE, influenza 65up-adj, insulin aspart U-100, melatonin, naloxone, ondansetron, polyethylene glycol, prochlorperazine, simethicone, sodium chloride 0.9%, traZODone    Objective:     Weight: 92 kg (202 lb 13.2 oz)  Body mass index is 35.93 kg/m².  Vital Signs (Most Recent):  Temp: 98.3 °F (36.8 °C) (11/28/22 1143)  Pulse: 81 (11/28/22 1143)  Resp: 16 (11/28/22 1143)  BP: (!) 142/65 (11/28/22 1143)  SpO2: (!) 93 % (11/28/22 1143)   Vital Signs (24h Range):  Temp:  [97.5 °F (36.4 °C)-98.3 °F (36.8 °C)] 98.3 °F (36.8 °C)  Pulse:  [] 81  Resp:  [16-20] 16  SpO2:  [93 %-97 %] 93 %  BP: (142-168)/(65-78) 142/65                            Physical Exam  General: Awake, alert, oriented  Head: Non-traumatic, normocephalic  Eyes: Pupils equal, EOMI  Neck: Supple, normal ROM, no tenderness to palpation  CVS: Normal rate and rhythm  Pulm: Symmetric expansion, no respiratory distress  GI: Abdomen nondistended, nontender  MSK: Moves all extremities without restriction, atraumatic  Skin: Dry, intact  Psych: Normal thought content and cognition    Neurosurgery Physical Exam  AOx3, E4V5M6  Occasional word finding difficulties  CNII-XII: Intact on fine exam, PERRL, visual fields grossly intact, EOMI, facial sensation preserved, no facial asymmetry, tongue midline, shoulder shrug equal,  no pronator drift  Extremities: 5/5 motor throughout, SILT, coordination intact throughout    Significant Labs:  Recent Labs   Lab 11/27/22  0857 11/28/22  0614   * 292*   * 133*   K 3.9 4.0    101   CO2 20* 22*   BUN 22 21   CREATININE 0.9 0.8   CALCIUM 8.8 8.6*       Recent Labs   Lab 11/27/22  0857 11/28/22  0614   WBC 13.32* 11.36   HGB 14.6 14.4   HCT 44.4 44.3    221       No results for input(s): LABPT, INR, APTT in the last 48 hours.    Microbiology Results (last 7 days)       ** No results found for the last 168 hours. **          All pertinent labs from the last 24 hours have been reviewed.    Significant Diagnostics:  CT: No results found in the last 24 hours.  I have reviewed all pertinent imaging results/findings within the past 24 hours.  Review of Systems

## 2022-11-28 NOTE — ASSESSMENT & PLAN NOTE
Candy Oreilly is a 87 y.o. female with PMH including CAD, GERD, thyroid disease and arthritis who presents with 1 week of disorientation and word finding difficulties. NSGY consulted for possible L temporal brain mass.    --Admit to  for metastatic workup   -q4h neurochecks on floor  --CTH 11/23: grossly stable mass compared to 11/21  --MRI 11/23: enhancing L temporal mass with some surrounding edema, no blood on SWI  --CT chest/abdo/pelvis 11/24: soft tissue mass L breast, opacities in lungs, C7 body sclerosis, b/l calfied plaques (asbestos)  --Heme onc consulted - plan for L breast mass biopsy outpatient   - defer brain biopsy given breast mass more accessibly biopsed  --Rad onc consulted - no urgent need for radiation at this time, recommend review at CNS tumor board  --SBP <160  --Keppra 500 BID  --F/u EEG for possible seizures - no sz recorded  --Dex 4q6 x7d, then taper over 2 weeks  --PUD PPx while steroid treatment ongoing  --NSGY will sign off - follow up in clinic in 4-6 weeks or when breast biopsy results have returned    Dispo: per primary

## 2022-11-28 NOTE — PLAN OF CARE
[facility] completed LOCET via phone. [facility] faxed PASSR to obtain the 142 for NH admission.    CHIOMA Irby  [phone]

## 2022-11-28 NOTE — PLAN OF CARE
Endocrinology plan of care  11/28/2022     Attempted to check on patient this morning but on both occasions she remained in the bathroom.  Spoke with nursing staff who reported no overnight complaints.      -A1C     Lab Results   Component Value Date    HGBA1C 8.2 (H) 11/25/2022     -HOME REGIMEN: None     -INPATIENT REGIMEN:    - Levemir 15u qd   - Aspart 7u AC + low dose correction      - GLUCOSE TREND FOR THE PAST 24HRS: 240s-290s      - Glucose Goal 140-180mg/dL       - NO HYPOGYCEMIAS NOTED       -TOLERATING 100% OF PO DIET       - NPO?: No      - Steroids - Yes; Dexamethasone 4mg q6h (uncertain of duration yet)      - Tube Feeds - No    Plan:   - Global hyperglycemia still with post prandial elevation still suggesting more prandial needed  - Basal recently adjusted from 12 to 15 units Levemir daily    - Increase Levemir to 18 u qd starting tomorrow morning   - Increase Aspart to 9u AC + low dose correction  - Accuchecks ac/hs  - DM diet     - Hypoglycemia protocol in place  - If blood glucose greater than 300, please ask patient not to eat food or drink anything other than water until correctional insulin has brought it back below 250     ** Please notify Endocrine for any change and/or advance in diet**  ** Please call Endocrine for any BG related issues **  ** Notify endocrine if plans to stop steroids**        Discharge planning:  - Will need all testing supplies prescribed at d/c. Additionally, pt should begin to learn how to check BG - can ask RN to allow pt to perform supervised accuchecks  - Will determine d/c recs based on insulin dose requirement and plan for steroids - as she may not need insulin at d/c if she will not continue on steroids as her a1c is 8, which is only slightly above her goal of <8 given her age and co-morbidities                 **Please notify endocrinology of discharge plan for final recs**    Nicholas Manuel DO [facility], [address]  [address] [place]  [place], [place], [address]    Office: [phone]  Fax: [phone]

## 2022-11-28 NOTE — PLAN OF CARE
Problem: Adult Inpatient Plan of Care  Goal: Plan of Care Review  Outcome: Ongoing, Progressing  Goal: Patient-Specific Goal (Individualized)  Outcome: Ongoing, Progressing  Goal: Absence of Hospital-Acquired Illness or Injury  Outcome: Ongoing, Progressing  Goal: Optimal Comfort and Wellbeing  Outcome: Ongoing, Progressing  Goal: Readiness for Transition of Care  Outcome: Ongoing, Progressing     Problem: Fluid Imbalance (Pneumonia)  Goal: Fluid Balance  Outcome: Ongoing, Progressing     Problem: Infection (Pneumonia)  Goal: Resolution of Infection Signs and Symptoms  Outcome: Ongoing, Progressing     Problem: Respiratory Compromise (Pneumonia)  Goal: Effective Oxygenation and Ventilation  Outcome: Ongoing, Progressing     Problem: Skin Injury Risk Increased  Goal: Skin Health and Integrity  Outcome: Ongoing, Progressing     Problem: Fall Injury Risk  Goal: Absence of Fall and Fall-Related Injury  Outcome: Ongoing, Progressing     Problem: Diabetes Comorbidity  Goal: Blood Glucose Level Within Targeted Range  Outcome: Ongoing, Progressing

## 2022-11-28 NOTE — PLAN OF CARE
Problem: Diabetes Comorbidity  Goal: Blood Glucose Level Within Targeted Range  Outcome: Ongoing, Progressing     Problem: Adult Inpatient Plan of Care  Goal: Absence of Hospital-Acquired Illness or Injury  Outcome: Ongoing, Progressing       POC reviewed with the patient and patient verbalized understanding. All comments and concerns addressed. Bed locked in lowest position with bed alarm set, call light within reach. Safety precautions maintained. VSS, see flowsheets. Will continue to monitor for changes to POC and clinical condition.

## 2022-11-28 NOTE — SUBJECTIVE & OBJECTIVE
Interval History: feels better today. In a better spirit overall. Still dizzy and forgetful occasionally. Denies nausea.      Review of Systems   Constitutional:  Negative for chills and fever.   HENT:  Negative for sore throat.    Eyes:  Negative for visual disturbance.   Respiratory:  Negative for cough and shortness of breath.    Cardiovascular:  Negative for chest pain and palpitations.   Gastrointestinal:  Negative for abdominal pain.   Endocrine: Negative for cold intolerance, heat intolerance, polydipsia and polyphagia.   Genitourinary:  Negative for dysuria, frequency and urgency.   Musculoskeletal:  Negative for back pain.   Neurological:  Positive for dizziness and light-headedness. Negative for syncope, facial asymmetry, weakness, numbness and headaches.        Forgetfulness, unsteady gait.    Objective:     Vital Signs (Most Recent):  Temp: 97.7 °F (36.5 °C) (11/27/22 1510)  Pulse: 84 (11/27/22 1545)  Resp: 20 (11/27/22 1510)  BP: (!) 168/75 (11/27/22 1510)  SpO2: 95 % (11/27/22 1510)   Vital Signs (24h Range):  Temp:  [96.7 °F (35.9 °C)-97.8 °F (36.6 °C)] 97.7 °F (36.5 °C)  Pulse:  [56-84] 84  Resp:  [16-20] 20  SpO2:  [95 %-97 %] 95 %  BP: (122-170)/(58-79) 168/75     Weight: 92 kg (202 lb 13.2 oz)  Body mass index is 35.93 kg/m².    Intake/Output Summary (Last 24 hours) at 11/27/2022 1850  Last data filed at 11/27/2022 0546  Gross per 24 hour   Intake 240 ml   Output --   Net 240 ml        Physical Exam  Vitals reviewed.   Constitutional:       Appearance: Normal appearance.   HENT:      Head: Normocephalic and atraumatic.      Mouth/Throat:      Mouth: Mucous membranes are moist.   Eyes:      Extraocular Movements: Extraocular movements intact.      Pupils: Pupils are equal, round, and reactive to light.   Cardiovascular:      Rate and Rhythm: Normal rate and regular rhythm.   Pulmonary:      Effort: Pulmonary effort is normal.      Breath sounds: Normal breath sounds.   Abdominal:      General:  Abdomen is flat. Bowel sounds are normal.      Palpations: Abdomen is soft.   Musculoskeletal:         General: Normal range of motion.      Cervical back: Normal range of motion and neck supple.   Skin:     General: Skin is warm.   Neurological:      General: No focal deficit present.      Mental Status: She is alert and oriented to person, place, and time.   Psychiatric:      Comments: Less emotional this morning.        MELD-Na score: 7 at 11/26/2022  5:41 AM  MELD score: 7 at 11/26/2022  5:41 AM  Calculated from:  Serum Creatinine: 0.8 mg/dL (Using min of 1 mg/dL) at 11/26/2022  5:41 AM  Serum Sodium: 136 mmol/L at 11/26/2022  5:41 AM  Total Bilirubin: 1.2 mg/dL at 11/26/2022  5:41 AM  INR(ratio): 1.0 at 11/24/2022  1:15 AM  Age: 87 years    Significant Labs:  CBC:  Recent Labs   Lab 11/26/22  0541 11/27/22  0857   WBC 13.62* 13.32*   HGB 13.5 14.6   HCT 40.5 44.4    265       CMP:  Recent Labs   Lab 11/26/22  0541 11/27/22  0857    133*   K 3.7 3.9    101   CO2 23 20*   * 348*   BUN 22 22   CREATININE 0.8 0.9   CALCIUM 8.5* 8.8   PROT 5.5* 5.7*   ALBUMIN 3.1* 3.4*   BILITOT 1.2* 1.3*   ALKPHOS 66 75   AST 10 9*   ALT 15 16   ANIONGAP 11 12       PTINR:  No results for input(s): INR in the last 48 hours.

## 2022-11-28 NOTE — PLAN OF CARE
CM met with patient and daughter to discuss discharge plan.  Rec is SNF.  Patient wants to stay in area where daughter lives instead of home area.  Referrals sent.   11/28/22 2064   Post-Acute Status   Post-Acute Authorization Placement   Post-Acute Placement Status Referrals Sent

## 2022-11-28 NOTE — PLAN OF CARE
[facility] (H[facility])  Initial Discharge Assessment       Primary Care Provider: Chata Hurley MD    Admission Diagnosis: Brain mass [G93.89]  Chest pain [R07.9]    Admission Date: 11/23/2022  Expected Discharge Date:          Payor: MEDICARE / Plan: MEDICARE PART A & B / Product Type: Government /     Extended Emergency Contact Information  Primary Emergency Contact: mariaelena zhong  Mobile Phone: [phone]  Relation: Daughter  Preferred language: English   needed? No  Secondary Emergency Contact: Corey Oreilly  Mobile Phone: [phone]  Relation: Son   needed? No    Discharge Plan A: Skilled Nursing Facility  Discharge Plan B: Home with family, Home Health      [address] [address]  Phone: [phone] Fax: [phone]       CM obtained discharge planning assessment with patient and daughter.  Patient provided with discharge planning booklet.    Initial Assessment (most recent)       Adult Discharge Assessment - 11/28/22 3005          Discharge Assessment    Assessment Type Discharge Planning Assessment     Confirmed/corrected address, phone number and insurance Yes     Confirmed Demographics Correct on Facesheet     Source of Information patient     Communicated LOVE with patient/caregiver Date not available/Unable to determine     Reason For Admission brain mass     Lives With alone     Do you expect to return to your current living situation? Yes     Do you have help at home or someone to help you manage your care at home? Yes     Who are your caregiver(s) and their phone number(s)? Tyesha Oreilly - daughter [phone]     Prior to hospitilization cognitive status: Alert/Oriented     Current cognitive status: Alert/Oriented     Walking or Climbing Stairs Difficulty ambulation difficulty, requires equipment     Mobility Management rollator and cane     Dressing/Bathing Difficulty none     Equipment  Currently Used at Home rollator;cane, quad     Readmission within 30 days? No     Patient currently being followed by outpatient case management? No     Do you currently have service(s) that help you manage your care at home? No     Do you take prescription medications? Yes     Do you have prescription coverage? Yes     Coverage MEDICARE - MEDICARE PART A & B     Do you have any problems affording any of your prescribed medications? No     Is the patient taking medications as prescribed? yes     Who is going to help you get home at discharge? family     How do you get to doctors appointments? family or friend will provide     Are you on dialysis? No     Do you take coumadin? No     Discharge Plan A Skilled Nursing Facility     Discharge Plan B Home with family;Home Health     DME Needed Upon Discharge  none     Discharge Plan discussed with: Patient;Adult children

## 2022-11-28 NOTE — PT/OT/SLP PROGRESS
Physical Therapy      Patient Name:  Candy Oreilly   MRN:  [id number]    Patient not seen today secondary to nurse sated pt is off floor . Will follow-up next schedule PT visit.

## 2022-11-28 NOTE — ASSESSMENT & PLAN NOTE
Candy Oreilly is a 87 y.o. female with PMH including CAD, GERD, thyroid disease and arthritis who presents with 1 week of disorientation and word finding difficulties. NSGY consulted for possible L temporal brain mass.    --Admit to  for metastatic workup   -q4h neurochecks on floor  --CTH 11/23: grossly stable mass compared to 11/21  --MRI 11/23: enhancing L temporal mass with some surrounding edema, no blood on SWI  --CT chest/abdo/pelvis 11/24: soft tissue mass L breast, opacities in lungs, C7 body sclerosis, b/l calfied plaques (asbestos)  --Heme onc consulted - plan for L breast mass biopsy outpatient   - defer brain biopsy given breast mass more accessibly biopsed  --Rad onc consulted - no urgent need for radiation at this time, recommend review at CNS tumor board  --SBP <160  --Keppra 500 BID  --F/u EEG for possible seizures - no sz recorded  --Dex 4q6 x7d, then taper over 2 weeks  --PUD PPx while steroid treatment ongoing  --Continue to monitor clinically, notify NSGY immediately with any changes in neuro status    Dispo: per primary

## 2022-11-28 NOTE — TELEPHONE ENCOUNTER
LM to pt regarding appointment scheduled on 12/12 at 11:30a. Also informed will send appt info in letter to address on file

## 2022-11-28 NOTE — PT/OT/SLP PROGRESS
Occupational Therapy      Patient Name:  Candy Oreilly   MRN:  [id number]    Patient not seen today secondary to Off the floor for procedure/surgery: MAMMO DIAG with tentative breast biopsy to follow. Will follow-up on next available date.    11/28/2022

## 2022-11-28 NOTE — NURSING
Pt Bg 248 however, insulin was given at 1733 and as a result , advisory states medication is too soon. Will  recheck BG after 10 pm

## 2022-11-28 NOTE — ASSESSMENT & PLAN NOTE
Suspect metastatic given the left breast mass with possible lung mets seen on CT chest today.   Neurosurgery will defer brain bx since there is a breast mass that is more accessible for bx. Oncology will plan for breast tissue bx outpt.   Continue dexamethasone 4 mg q6h. I inquired about the dexa taper dose, but have not received an answer from neurosurgery as of yet.  24 h EEG negative for seizure.

## 2022-11-28 NOTE — ASSESSMENT & PLAN NOTE
Acute, likely from steroid injection  Does not have history of diabetes prior to admission  HgbA1c came back at 8.2.  Case was discussed with endo.  Endo will increase levemir from 12 to 15 units. keep aspart at 7 units with meals. Keep sliding scale low.

## 2022-11-28 NOTE — PROGRESS NOTES
[facility] ([facility])  [facility] Medicine  Progress Note    Patient Name: Candy Oreilly  MRN: [id number]  Patient Class: IP- Inpatient   Admission Date: 11/23/2022  Length of Stay: 3 days  Attending Physician: Briana Hui MD  Primary Care Provider: Chata Hurley MD        Subjective:     Principal Problem:Brain mass        HPI:  88 yo female with CAD, GERD, thyroid disease, arthritis, anxiety presenting to outside hospital for difficulty with word finding and mild disorientation transferred to [facility] for concern of L temporal brain mass. She has been having syncopal episodes as well during this past week as well during the word finding difficulties. She had some LLE tingling as well. This was not getting any better so her family brought her in for evaluation to outside ED. CT showing brain mass, transferred here for DEV vela. She denies any long history of smoking, she denies any weight loss fevers, no abnormal vaginal bleeding. She does occasionally get hemorrhoidal bleeding. She does report some diarrhea that last a while a few weeks ago, but has been clearing up. She denies any melena or BRBPR. She did have normal screening exams but those ended 2/2 age.     In ED, NSGY and NCC dane'd, approved for floor admission to medicine. Patient started on keppra and steroids. Medicine called for possible cancer workup.      Overview/Hospital Course:  On 11/25; CT C/A/P ordered yesterday. Result came back today showing;  1. Soft tissue mass in the left breast.  Malignancy not excluded.  Recommend correlation with physical exam and mammogram.   2. Few solid and ground-glass opacities in the lungs as detailed above measuring up to 0.6 cm.  Clinical and oncologic considerations will determine the role and schedule for continued surveillance.   3. Indeterminate small focus of sclerosis in the anterior C7 vertebral body.  Bone scan could provide further characterization if clinically indicated.   4.  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     Case was discussed with neurosurgery and oncology in details. Neurosurgery will defer brain bx since there is a breast mass that is more accessible for bx. Oncology will plan for breast tissue bx outpt.   Blood sugar levels are very high this morning, likely 2/2 dexamethasone. I ordered lantus and humalog sliding scale. Endo was consulted. Case was discussed with endo.    On 11/26:  Blood pressure was high yesterday due to anxiety.  Patient became anxious and upset after being diagnosed with possible metastatic breast cancer.  She was emotional, crying.  Multiple doses of extra medication were given including 2 doses of hydralazine 25 mg, as well as Coreg 6.25 mg b.i.d. was started.  This morning, blood pressure and mood are better.  Case was discussed with Endocrine, and adjustment to her insulin regimen were added.  I did spoke to PT/OT.  Patient will likely need skilled nursing facility.  I tried to reach neurosurgery to inquire about patient's DEXA regimen outpatient: But no answer as of yet.  I also consulted radiation oncology.    On 11/27; 24 h EEG extended from yesterday to today and it was negative.  Pending radiation oncology eval and SNF placement.      Interval History: feels better today. In a better spirit overall. Still dizzy and forgetful occasionally. Denies nausea.      Review of Systems   Constitutional:  Negative for chills and fever.   HENT:  Negative for sore throat.    Eyes:  Negative for visual disturbance.   Respiratory:  Negative for cough and shortness of breath.    Cardiovascular:  Negative for chest pain and palpitations.   Gastrointestinal:  Negative for abdominal pain.   Endocrine: Negative for cold intolerance, heat intolerance, polydipsia and polyphagia.   Genitourinary:  Negative for dysuria, frequency and urgency.   Musculoskeletal:  Negative for back pain.   Neurological:  Positive for  dizziness and light-headedness. Negative for syncope, facial asymmetry, weakness, numbness and headaches.        Forgetfulness, unsteady gait.    Objective:     Vital Signs (Most Recent):  Temp: 97.7 °F (36.5 °C) (11/27/22 1510)  Pulse: 84 (11/27/22 1545)  Resp: 20 (11/27/22 1510)  BP: (!) 168/75 (11/27/22 1510)  SpO2: 95 % (11/27/22 1510)   Vital Signs (24h Range):  Temp:  [96.7 °F (35.9 °C)-97.8 °F (36.6 °C)] 97.7 °F (36.5 °C)  Pulse:  [56-84] 84  Resp:  [16-20] 20  SpO2:  [95 %-97 %] 95 %  BP: (122-170)/(58-79) 168/75     Weight: 92 kg (202 lb 13.2 oz)  Body mass index is 35.93 kg/m².    Intake/Output Summary (Last 24 hours) at 11/27/2022 1850  Last data filed at 11/27/2022 0546  Gross per 24 hour   Intake 240 ml   Output --   Net 240 ml        Physical Exam  Vitals reviewed.   Constitutional:       Appearance: Normal appearance.   HENT:      Head: Normocephalic and atraumatic.      Mouth/Throat:      Mouth: Mucous membranes are moist.   Eyes:      Extraocular Movements: Extraocular movements intact.      Pupils: Pupils are equal, round, and reactive to light.   Cardiovascular:      Rate and Rhythm: Normal rate and regular rhythm.   Pulmonary:      Effort: Pulmonary effort is normal.      Breath sounds: Normal breath sounds.   Abdominal:      General: Abdomen is flat. Bowel sounds are normal.      Palpations: Abdomen is soft.   Musculoskeletal:         General: Normal range of motion.      Cervical back: Normal range of motion and neck supple.   Skin:     General: Skin is warm.   Neurological:      General: No focal deficit present.      Mental Status: She is alert and oriented to person, place, and time.   Psychiatric:      Comments: Less emotional this morning.        MELD-Na score: 7 at 11/26/2022  5:41 AM  MELD score: 7 at 11/26/2022  5:41 AM  Calculated from:  Serum Creatinine: 0.8 mg/dL (Using min of 1 mg/dL) at 11/26/2022  5:41 AM  Serum Sodium: 136 mmol/L at 11/26/2022  5:41 AM  Total Bilirubin: 1.2 mg/dL  at 11/26/2022  5:41 AM  INR(ratio): 1.0 at 11/24/2022  1:15 AM  Age: 87 years    Significant Labs:  CBC:  Recent Labs   Lab 11/26/22  0541 11/27/22  0857   WBC 13.62* 13.32*   HGB 13.5 14.6   HCT 40.5 44.4    265       CMP:  Recent Labs   Lab 11/26/22  0541 11/27/22  0857    133*   K 3.7 3.9    101   CO2 23 20*   * 348*   BUN 22 22   CREATININE 0.8 0.9   CALCIUM 8.5* 8.8   PROT 5.5* 5.7*   ALBUMIN 3.1* 3.4*   BILITOT 1.2* 1.3*   ALKPHOS 66 75   AST 10 9*   ALT 15 16   ANIONGAP 11 12       PTINR:  No results for input(s): INR in the last 48 hours.          Assessment/Plan:      * Brain mass  Suspect metastatic given the left breast mass with possible lung mets seen on CT chest today.   Neurosurgery will defer brain bx since there is a breast mass that is more accessible for bx. Oncology will plan for breast tissue bx outpt.   Continue dexamethasone 4 mg q6h. I inquired about the dexa taper dose, but have not received an answer from neurosurgery as of yet.  24 h EEG negative for seizure.    Mass of left breast  Suspect metastatic breast cancer.  Patient has what appears to be bilateral lung nodules, and also brain mass.  Oncology will arrange outpatient tissue biopsy.      Type 2 diabetes mellitus without complication, without long-term current use of insulin  Acute, likely from steroid injection  Does not have history of diabetes prior to admission  HgbA1c came back at 8.2.  Case was discussed with endo.  Endo will increase levemir from 12 to 15 units. keep aspart at 7 units with meals. Keep sliding scale low.     Hypertension  continue coreg 6.25 mg bid. If bp continued to trend up, will increase coreg.   Continue hydralazine prn for sbp >160.      Adverse effect of glucocorticoid or synthetic analogue  Elevated blood sugar and HTN.   See plan of care of each.      Hypercholesterolemia  Chronic, controlled, continue statin      Gastroesophageal reflux disease  Chronic, controlled, continue  PPI      Acquired hypothyroidism  Chronic, controlled, continue synthroid      Anxiety  Chronic, controlled, PRN benzo if needed        VTE Risk Mitigation (From admission, onward)         Ordered     IP VTE HIGH RISK PATIENT  Once         11/24/22 0232     Place sequential compression device  Until discontinued         11/24/22 0232     Reason for No Pharmacological VTE Prophylaxis  Once        Question:  Reasons:  Answer:  Risk of Bleeding    11/24/22 0232                Discharge Planning   LOVE:      Code Status: Full Code   Is the patient medically ready for discharge?:     Reason for patient still in hospital (select all that apply): Patient trending condition           Pending radiation oncology recs, and SNF placement.          Briana Hui MD  Department of Hospital Medicine   [facility] - Neurosurgery ([facility])

## 2022-11-29 LAB
ALBUMIN SERPL BCP-MCNC: 3.1 G/DL (ref 3.5–5.2)
ALP SERPL-CCNC: 67 U/L (ref 55–135)
ALT SERPL W/O P-5'-P-CCNC: 15 U/L (ref 10–44)
ANION GAP SERPL CALC-SCNC: 9 MMOL/L (ref 8–16)
AST SERPL-CCNC: 10 U/L (ref 10–40)
BASOPHILS # BLD AUTO: 0.04 K/UL (ref 0–0.2)
BASOPHILS NFR BLD: 0.3 % (ref 0–1.9)
BILIRUB SERPL-MCNC: 1.1 MG/DL (ref 0.1–1)
BUN SERPL-MCNC: 19 MG/DL (ref 8–23)
CALCIUM SERPL-MCNC: 8.6 MG/DL (ref 8.7–10.5)
CHLORIDE SERPL-SCNC: 100 MMOL/L (ref 95–110)
CO2 SERPL-SCNC: 26 MMOL/L (ref 23–29)
CREAT SERPL-MCNC: 0.8 MG/DL (ref 0.5–1.4)
DIFFERENTIAL METHOD: ABNORMAL
EOSINOPHIL # BLD AUTO: 0 K/UL (ref 0–0.5)
EOSINOPHIL NFR BLD: 0 % (ref 0–8)
ERYTHROCYTE [DISTWIDTH] IN BLOOD BY AUTOMATED COUNT: 13.5 % (ref 11.5–14.5)
EST. GFR  (NO RACE VARIABLE): >60 ML/MIN/1.73 M^2
GLUCOSE SERPL-MCNC: 260 MG/DL (ref 70–110)
HCT VFR BLD AUTO: 44.3 % (ref 37–48.5)
HGB BLD-MCNC: 14.5 G/DL (ref 12–16)
IMM GRANULOCYTES # BLD AUTO: 0.36 K/UL (ref 0–0.04)
IMM GRANULOCYTES NFR BLD AUTO: 3 % (ref 0–0.5)
LYMPHOCYTES # BLD AUTO: 1.6 K/UL (ref 1–4.8)
LYMPHOCYTES NFR BLD: 12.8 % (ref 18–48)
MCH RBC QN AUTO: 28.4 PG (ref 27–31)
MCHC RBC AUTO-ENTMCNC: 32.7 G/DL (ref 32–36)
MCV RBC AUTO: 87 FL (ref 82–98)
MONOCYTES # BLD AUTO: 0.7 K/UL (ref 0.3–1)
MONOCYTES NFR BLD: 5.5 % (ref 4–15)
NEUTROPHILS # BLD AUTO: 9.5 K/UL (ref 1.8–7.7)
NEUTROPHILS NFR BLD: 78.4 % (ref 38–73)
NRBC BLD-RTO: 0 /100 WBC
PLATELET # BLD AUTO: 246 K/UL (ref 150–450)
PMV BLD AUTO: 11.2 FL (ref 9.2–12.9)
POCT GLUCOSE: 178 MG/DL (ref 70–110)
POCT GLUCOSE: 189 MG/DL (ref 70–110)
POCT GLUCOSE: 239 MG/DL (ref 70–110)
POCT GLUCOSE: 251 MG/DL (ref 70–110)
POCT GLUCOSE: 260 MG/DL (ref 70–110)
POTASSIUM SERPL-SCNC: 4.2 MMOL/L (ref 3.5–5.1)
PROT SERPL-MCNC: 5.5 G/DL (ref 6–8.4)
RBC # BLD AUTO: 5.1 M/UL (ref 4–5.4)
SODIUM SERPL-SCNC: 135 MMOL/L (ref 136–145)
WBC # BLD AUTO: 12.11 K/UL (ref 3.9–12.7)

## 2022-11-29 PROCEDURE — 63600175 PHARM REV CODE 636 W HCPCS

## 2022-11-29 PROCEDURE — 99232 PR SUBSEQUENT HOSPITAL CARE,LEVL II: ICD-10-PCS | Mod: ,,, | Performed by: HOSPITALIST

## 2022-11-29 PROCEDURE — 94761 N-INVAS EAR/PLS OXIMETRY MLT: CPT

## 2022-11-29 PROCEDURE — 85025 COMPLETE CBC W/AUTO DIFF WBC: CPT | Performed by: HOSPITALIST

## 2022-11-29 PROCEDURE — 36415 COLL VENOUS BLD VENIPUNCTURE: CPT | Performed by: HOSPITALIST

## 2022-11-29 PROCEDURE — 86580 TB INTRADERMAL TEST: CPT | Performed by: HOSPITALIST

## 2022-11-29 PROCEDURE — 99232 SBSQ HOSP IP/OBS MODERATE 35: CPT | Mod: GC,,, | Performed by: INTERNAL MEDICINE

## 2022-11-29 PROCEDURE — 99232 SBSQ HOSP IP/OBS MODERATE 35: CPT | Mod: ,,, | Performed by: HOSPITALIST

## 2022-11-29 PROCEDURE — 99232 PR SUBSEQUENT HOSPITAL CARE,LEVL II: ICD-10-PCS | Mod: GC,,, | Performed by: INTERNAL MEDICINE

## 2022-11-29 PROCEDURE — 97530 THERAPEUTIC ACTIVITIES: CPT

## 2022-11-29 PROCEDURE — 25000003 PHARM REV CODE 250: Performed by: INTERNAL MEDICINE

## 2022-11-29 PROCEDURE — 30200315 PPD INTRADERMAL TEST REV CODE 302: Performed by: HOSPITALIST

## 2022-11-29 PROCEDURE — 25000003 PHARM REV CODE 250: Performed by: HOSPITALIST

## 2022-11-29 PROCEDURE — 11000001 HC ACUTE MED/SURG PRIVATE ROOM

## 2022-11-29 PROCEDURE — 80053 COMPREHEN METABOLIC PANEL: CPT | Performed by: HOSPITALIST

## 2022-11-29 PROCEDURE — 97116 GAIT TRAINING THERAPY: CPT

## 2022-11-29 RX ORDER — AMLODIPINE BESYLATE 10 MG/1
10 TABLET ORAL DAILY
Status: DISCONTINUED | OUTPATIENT
Start: 2022-11-29 | End: 2022-12-01 | Stop reason: HOSPADM

## 2022-11-29 RX ADMIN — TUBERCULIN PURIFIED PROTEIN DERIVATIVE 5 UNITS: 5 INJECTION, SOLUTION INTRADERMAL at 05:11

## 2022-11-29 RX ADMIN — CARVEDILOL 6.25 MG: 6.25 TABLET, FILM COATED ORAL at 08:11

## 2022-11-29 RX ADMIN — DEXAMETHASONE SODIUM PHOSPHATE 4 MG: 4 INJECTION INTRA-ARTICULAR; INTRALESIONAL; INTRAMUSCULAR; INTRAVENOUS; SOFT TISSUE at 12:11

## 2022-11-29 RX ADMIN — INSULIN ASPART 9 UNITS: 100 INJECTION, SOLUTION INTRAVENOUS; SUBCUTANEOUS at 05:11

## 2022-11-29 RX ADMIN — LEVETIRACETAM 500 MG: 500 TABLET, FILM COATED ORAL at 08:11

## 2022-11-29 RX ADMIN — INSULIN ASPART 3 UNITS: 100 INJECTION, SOLUTION INTRAVENOUS; SUBCUTANEOUS at 07:11

## 2022-11-29 RX ADMIN — CETIRIZINE HYDROCHLORIDE 5 MG: 5 TABLET, FILM COATED ORAL at 08:11

## 2022-11-29 RX ADMIN — DEXAMETHASONE SODIUM PHOSPHATE 4 MG: 4 INJECTION INTRA-ARTICULAR; INTRALESIONAL; INTRAMUSCULAR; INTRAVENOUS; SOFT TISSUE at 05:11

## 2022-11-29 RX ADMIN — PANTOPRAZOLE SODIUM 40 MG: 20 TABLET, DELAYED RELEASE ORAL at 08:11

## 2022-11-29 RX ADMIN — INSULIN ASPART 1 UNITS: 100 INJECTION, SOLUTION INTRAVENOUS; SUBCUTANEOUS at 05:11

## 2022-11-29 RX ADMIN — AMLODIPINE BESYLATE 10 MG: 10 TABLET ORAL at 08:11

## 2022-11-29 RX ADMIN — INSULIN ASPART 9 UNITS: 100 INJECTION, SOLUTION INTRAVENOUS; SUBCUTANEOUS at 07:11

## 2022-11-29 RX ADMIN — INSULIN ASPART 9 UNITS: 100 INJECTION, SOLUTION INTRAVENOUS; SUBCUTANEOUS at 12:11

## 2022-11-29 RX ADMIN — INSULIN ASPART 2 UNITS: 100 INJECTION, SOLUTION INTRAVENOUS; SUBCUTANEOUS at 12:11

## 2022-11-29 RX ADMIN — FLUTICASONE PROPIONATE 100 MCG: 50 SPRAY, METERED NASAL at 09:11

## 2022-11-29 RX ADMIN — HYDRALAZINE HYDROCHLORIDE 25 MG: 25 TABLET, FILM COATED ORAL at 05:11

## 2022-11-29 RX ADMIN — TRAZODONE HYDROCHLORIDE 25 MG: 50 TABLET ORAL at 12:11

## 2022-11-29 RX ADMIN — LEVOTHYROXINE SODIUM 75 MCG: 75 TABLET ORAL at 08:11

## 2022-11-29 RX ADMIN — Medication 6 MG: at 08:11

## 2022-11-29 RX ADMIN — ALPRAZOLAM 0.5 MG: 0.25 TABLET ORAL at 08:11

## 2022-11-29 RX ADMIN — INSULIN ASPART 3 UNITS: 100 INJECTION, SOLUTION INTRAVENOUS; SUBCUTANEOUS at 08:11

## 2022-11-29 NOTE — PROGRESS NOTES
Gilberto Reid - Neurosurgery ([facility])  Endocrinology  Progress Note    Admit Date: 2022     Reason for Consult: Management of newly diagnosed T2DM, Hyperglycemia     Surgical Procedure and Date: n/a    Diabetes diagnosis year: 2022 (current admission)    Home Diabetes Medications:  none    How often checking glucose at home?  N/a    BG readings on regimen: n/a  Hypoglycemia on the regimen?  n/a  Missed doses on regimen?  n/a    Diabetes Complications include:     Hyperglycemia    Complicating diabetes co morbidities:   Active Cancer and CAD      HPI:   Patient is a 87 y.o. female with a diagnosis of CAD, GERD, and hypothyroidism presented on 2022 to OSH with 1 week of disorientation and word finding difficulties. She has occasional episodes of wanting to pass out with associated LLE tingling. CTH at OSH revealed temporal brain mass. She was transferred to [facility] [place] [place] on 2022 for neurosurgery evaluation for new brain mass. At [facility] [place] [place] CT CAP was obtained and revealed L breast soft tissue mass and pulmonary opacities, and sclerosis of C7. Heme/onc was consulted, they recommend biopsy of L breast mass outpatient. NSGY recommended Keppra and 7-day course of dexamethasone 4mg q6h.    Endocrinology was consulted for hyperglycemia and newly diagnosed DM.    No previous knowledge of DM. She has been experiencing polyuria and polydipsia at home however.  No family hx of diabetes  She has hypothyroidism (for many years per pt) and is on levothyroxine 75 mcg daily. TSH 2022 at OSH normal. She is taking LT4 correctly.     Lab Results   Component Value Date    HGBA1C 8.2 (H) 2022           Interval HPI:   Overnight events: Overnight no new complaints.  Continues to be on Dexamethasone.  Cardiac diet in place with no concerns for nausea or vomiting.  Only a mild headache this AM.  Regimen adjusted yesterday PM with some improvement seen but still above goal.   Eatin%  Nausea: No  Hypoglycemia and  "intervention: No  Fever: No  TPN and/or TF: No  If yes, type of TF/TPN and rate: N/A    /71   Pulse 81   Temp 97.7 °F (36.5 °C)   Resp 14   Ht 5' 3" (1.6 m)   Wt 92 kg (202 lb 13.2 oz)   LMP  (LMP Unknown)   SpO2 96%   BMI 35.93 kg/m²     Labs Reviewed and Include    Recent Labs   Lab 11/29/22  0455   *   CALCIUM 8.6*   ALBUMIN 3.1*   PROT 5.5*   *   K 4.2   CO2 26      BUN 19   CREATININE 0.8   ALKPHOS 67   ALT 15   AST 10   BILITOT 1.1*     Lab Results   Component Value Date    WBC 12.11 11/29/2022    HGB 14.5 11/29/2022    HCT 44.3 11/29/2022    MCV 87 11/29/2022     11/29/2022     No results for input(s): TSH, FREET4 in the last 168 hours.  Lab Results   Component Value Date    HGBA1C 8.2 (H) 11/25/2022       Nutritional status:   Body mass index is 35.93 kg/m².  Lab Results   Component Value Date    ALBUMIN 3.1 (L) 11/29/2022    ALBUMIN 3.1 (L) 11/28/2022    ALBUMIN 3.4 (L) 11/27/2022     No results found for: PREALBUMIN    Estimated Creatinine Clearance: 53.3 mL/min (based on SCr of 0.8 mg/dL).    Accu-Checks  Recent Labs     11/26/22  1626 11/26/22  2128 11/27/22  0849 11/27/22  1200 11/27/22  2042 11/28/22  0844 11/28/22  1136 11/28/22  1629 11/28/22  2210 11/29/22  0752   POCTGLUCOSE 265* 264* 326* 241* 248* 259* 274* 259* 178* 260*       Current Medications and/or Treatments Impacting Glycemic Control  Immunotherapy:    Immunosuppressants       None          Steroids:   Hormones (From admission, onward)      Start     Stop Route Frequency Ordered    11/24/22 0331  melatonin tablet 6 mg         -- Oral Nightly PRN 11/24/22 0232    11/24/22 0000  dexAMETHasone injection 4 mg         -- IV Every 6 hours 11/23/22 2247          Pressors:    Autonomic Drugs (From admission, onward)      None          Hyperglycemia/Diabetes Medications:   Antihyperglycemics (From admission, onward)      Start     Stop Route Frequency Ordered    11/29/22 0900  insulin detemir U-100 pen 18 " Units         -- SubQ Daily 11/28/22 1542    11/28/22 2100  insulin aspart U-100 pen 0-5 Units         -- SubQ Before meals & nightly 11/28/22 2035    11/28/22 1645  insulin aspart U-100 pen 9 Units         -- SubQ 3 times daily with meals 11/28/22 1214            ASSESSMENT and PLAN    Adverse effect of glucocorticoid or synthetic analogue  Currently on dexamethasone 4mg q6h for brain mass per NSGY  Will increase BG, especially prandial -- this prandial heavy insulin regimen currently   Please notify endocrine of plan for steroids at d/c as this will impact our d/c recommendations     Mass of left breast  New L breast mass and L temporal brain mass concerning for malignancy  Underwent imaging with biopsy of left breast mass 11/28/22 (path pending) and marker placement.  Management per primary    Type 2 diabetes mellitus without complication, without long-term current use of insulin  Newly diagnosed DM this admission, likely T2DM  Lab Results   Component Value Date    HGBA1C 8.2 (H) 11/25/2022     BG above goal  - increased both prandial and basal doses in the past 24 hours  Note prandial heavy regimen due to high dose corticosteroids currently (started this admission for brain mass. Previously no glucocorticoids outpatient)    - Continue Levemir to 18u qd  - Continue Aspart to 9u AC  - Continue low dose correction scale  - accuchecks ac/hs  - DM diet  - Will need all testing supplies prescribed at d/c. Additionally, pt should begin to learn how to check BG - can ask RN to allow pt to perform supervised accuchecks  - Will determine d/c recs based on insulin dose requirement and plan for steroids - as she may not need insulin at d/c if she will not continue on steroids as her a1c is 8, which is only slightly above her goal of <8 given her age and comorbidities    - Hypoglycemia protocol in place  - If blood glucose greater than 300, please ask patient not to eat food or drink anything other than water until  correctional insulin has brought it back below 250    ** Please notify Endocrine for any change and/or advance in diet**  ** Please call Endocrine for any BG related issues **      Acquired hypothyroidism  Pt reports she has had hypothyroidism for many years   Taking levothyroxine 75 mcg daily - no recent dose adjustments. She is taking it correctly, first thing in the morning without food or other meds.  TSH at OSH Normal  Continue home levothyroxine    Lab Results   Component Value Date    TSH 2.4840 11/21/2022             Jose Bello, DO  Endocrinology  [facility] - Neurosurgery ([facility])

## 2022-11-29 NOTE — ASSESSMENT & PLAN NOTE
Suspect metastatic given the left breast mass with possible lung mets seen on CT chest today.   Neurosurgery will defer brain bx since there is a breast mass that is more accessible for bx.   Pt had US guided left breast bx done today.   Continue dexamethasone 4 mg q6h for 7 days, then taper over 2 weeks per neurosurgery.  24 h EEG negative for seizure.

## 2022-11-29 NOTE — ASSESSMENT & PLAN NOTE
Acute, likely from steroid injection  Does not have history of diabetes prior to admission  HgbA1c came back at 8.2.  Case was discussed with endo.  Endo increased levemir from to to 18 units, and aspart from 7 to 9 units with meals. Keep sliding scale low.

## 2022-11-29 NOTE — PLAN OF CARE
Problem: Adult Inpatient Plan of Care  Goal: Readiness for Transition of Care  Outcome: Ongoing, Progressing     Problem: Adult Inpatient Plan of Care  Goal: Optimal Comfort and Wellbeing  Outcome: Ongoing, Progressing     POC reviewed with the patient and they verbalized understanding. Waiting for SNF place ment , All comments and concerns addressed. Bed locked in lowest position with bed alarm set, call light within reach. Safety precautions maintained. VSS, see flowsheets.  Will continue to monitor for changes to POC and clinical condition.

## 2022-11-29 NOTE — ASSESSMENT & PLAN NOTE
Newly diagnosed DM this admission, likely T2DM  Lab Results   Component Value Date    HGBA1C 8.2 (H) 11/25/2022     BG above goal  - increased both prandial and basal doses in the past 24 hours  Note prandial heavy regimen due to high dose corticosteroids currently (started this admission for brain mass. Previously no glucocorticoids outpatient)    - Continue Levemir to 18u qd  - Continue Aspart to 9u AC  - Continue low dose correction scale  - accuchecks ac/hs  - DM diet  - Will need all testing supplies prescribed at d/c. Additionally, pt should begin to learn how to check BG - can ask RN to allow pt to perform supervised accuchecks  - Will determine d/c recs based on insulin dose requirement and plan for steroids - as she may not need insulin at d/c if she will not continue on steroids as her a1c is 8, which is only slightly above her goal of <8 given her age and comorbidities    - Hypoglycemia protocol in place  - If blood glucose greater than 300, please ask patient not to eat food or drink anything other than water until correctional insulin has brought it back below 250    ** Please notify Endocrine for any change and/or advance in diet**  ** Please call Endocrine for any BG related issues **

## 2022-11-29 NOTE — ASSESSMENT & PLAN NOTE
Suspect metastatic breast cancer.  Patient has what appears to be bilateral lung nodules, and also brain mass.  Pt had mammogram today and US guided left breast bx.

## 2022-11-29 NOTE — ASSESSMENT & PLAN NOTE
New L breast mass and L temporal brain mass concerning for malignancy  Underwent imaging with biopsy of left breast mass 11/28/22 (path pending) and marker placement.  Management per primary

## 2022-11-29 NOTE — ASSESSMENT & PLAN NOTE
continue coreg 6.25 mg bid. If bp continued to trend up, will increase coreg. Norvasc added today for better bp control.   Continue hydralazine prn for sbp >160.

## 2022-11-29 NOTE — CONSULTS
"Consultation Report  Ophthalmology Service    Date: 11/29/2022    Chief complaint/Reason for Consult: "left eye blurry vision, brain mass"     History of Present Illness: Candy Oreilly is a 87 y.o. female with PMHx of CAD, GERD, thyroid disease, arthritis, anxiety who presented to OSH for word finding difficulty, mild disorientation, and syncope found to have  L temporal brain mass. Pt transferred to [facility] [place] for NSGY eval. Patient reporting blurry vision and FBS OS as well as mild pressure over left temple. Ophthalmology consulted for evaluation of blurry vision OS.     Patient reports the above sx have been gradually worsening over the past few months, and reports that the blurriness occasionally clears with blinking. Also reports occasional epiphora and mucus buildup OS.  Denies eye pain/pain w/ eye movement, diplopia, flashes, floaters, or curtains/veils over vision.    POcularHx:   PCIOL s/p yag OU ~2010  Refractive error/presbyopia     Current eye gtts: Denies     Family Hx: Denies family history of glaucoma, macular degeneration, or blindness. family history includes Breast cancer in her sister; Cancer in her brother; Colon cancer in her father; Lung cancer in her father; Thyroid cancer in her mother.     PMHx:  has a past medical history of Anxiety disorder, unspecified, Coronary artery disease, GERD (gastroesophageal reflux disease), and Thyroid disease.     PSurgHx:  has a past surgical history that includes Cholecystectomy and Appendectomy.     Home Medications:   Prior to Admission medications    Medication Sig Start Date End Date Taking? Authorizing Provider   ALPRAZolam (XANAX) 0.5 MG tablet Take 0.5 mg by mouth daily as needed. 10/22/22   Historical Provider   atenoloL (TENORMIN) 50 MG tablet Take 1 tablet by mouth once daily. 10/17/22   Historical Provider   celecoxib (CELEBREX) 200 MG capsule Take 1 capsule by mouth once daily. 10/17/22   Historical Provider   citalopram (CELEXA) 20 MG tablet Take " 1 tablet by mouth once daily. 8/6/22   Historical Provider   dexAMETHasone (DECADRON) 2 MG tablet Take 2 tablets (4 mg total) by mouth daily with breakfast. for 10 days 11/22/22 12/2/22  López Banerjee MD   fexofenadine (ALLEGRA) 180 MG tablet Take 1 tablet by mouth once daily. 10/17/22   Historical Provider   fluticasone propionate (FLONASE) 50 mcg/actuation nasal spray 1 spray by Each Nostril route once daily. 10/17/22   Historical Provider   hydroCHLOROthiazide (HYDRODIURIL) 25 MG tablet Take 0.5 tablets by mouth once daily. 10/17/22   Historical Provider   levETIRAcetam (KEPPRA) 500 MG Tab Take 1 tablet (500 mg total) by mouth 2 (two) times daily. 11/22/22 12/22/22  López Banerjee MD   levothyroxine (SYNTHROID) 75 MCG tablet Take 1 tablet by mouth once daily. 10/17/22   Historical Provider   pantoprazole (PROTONIX) 40 MG tablet Take 1 tablet by mouth once daily. 10/17/22   Historical Provider        Medications this encounter:    amLODIPine  10 mg Oral Daily    carvediloL  6.25 mg Oral BID    cetirizine  5 mg Oral Daily    dexAMETHasone  4 mg Intravenous Q6H    fluticasone propionate  2 spray Each Nostril Daily    insulin aspart U-100  0-5 Units Subcutaneous QID (AC & HS)    insulin aspart U-100  9 Units Subcutaneous TIDWM    insulin detemir U-100  18 Units Subcutaneous Daily    levETIRAcetam  500 mg Oral BID    levothyroxine  75 mcg Oral Daily    pantoprazole  40 mg Oral BID       Allergies: is allergic to pregabalin, atorvastatin, dexlansoprazole, ezetimibe, gabapentin, onabotulinumtoxina, oxybutynin, rosuvastatin, sertraline, and meperidine.     Social:  reports that she has never smoked. She has never used smokeless tobacco.     ROS: As per HPI    Ocular examination/Dilated fundus examination:  Base Eye Exam       Visual Acuity (Snellen - Linear)         Right Left    Dist sc 20/50 20/40    Dist ph sc 20/40 20/30              Tonometry (Tonopen, 11:27 AM)         Right Left    Pressure 16 16               Pupils         Pupils Dark Light Shape React APD    Right PERRL 5 3 Round Brisk None    Left PERRL 5 3 Round Brisk None              Visual Fields         Right Left     Full Full              Extraocular Movement         Right Left     Full, Ortho Full, Ortho              Neuro/Psych       Oriented x3: Yes    Mood/Affect: Normal              Dilation       Both eyes: 1% Mydriacyl, 2.5% Phenylephrine @ 11:27 AM                  Slit Lamp and Fundus Exam       External Exam         Right Left    External Normal Normal              Slit Lamp Exam         Right Left    Lids/Lashes Normal Normal    Conjunctiva/Sclera White and quiet White and quiet    Cornea Few PEEs Few PEEs    Anterior Chamber Deep and formed Deep and formed    Iris Round and reactive Round and reactive    Lens PCIOL s/p yag cap PCIOL s/p yag cap    Anterior Vitreous Normal Normal              Fundus Exam         Right Left    Disc Pink & sharp Pink & sharp    Macula Flat, pigment mottling Flat, pigment mottling    Vessels Normal Normal    Periphery Flat w/o holes/tears/detachment, scattered areas of pigment mottling, no masses/lesions Flat w/o holes/tears/detachment, scattered areas of pigment mottling, no masses/lesions                      Assessment/Plan:     1. Blurry Vision, LEFT eye   2. Left Temporal Lobe Mass   3. Diabetes without retinopathy  4. Dry Eye Syndrome, OU  - Few mo of progressively worsening blurry vision OS that clears w/ blinking as well as FBS, pressure over L temple, and increased tearing OS  - Denies flashes, floaters, curtains/veils over vision, eye pain, diplopia   - PERRL no APD, EOMI, phVA 20/40 // 20/30, IOP wnl, CVF and color plates full OU  - Anterior exam and DFE within normal limits w/o evidence of masses/mets, no proptosis or incr iop, no optic nerve edema   - Pt w/ new DM diagnosis w/ A1c 8.2% 11/25/22, no retinopathy on exam   - Return precautions and RD precautions discussed, patient expressed  understanding  - Start Artificial tears TID-QID prn for GUZMAN relief   - Patient can continue to follow w/ outside ophthalmologist, encouraged pt to be seen within 1-2mo of discharge   - Patient and plan discussed w/ Dr Cherry     Patient's Best Contact Number: [phone]     John Valera MD  [facility] Ophthalmology, PGY-2  11/29/2022  11:34 AM

## 2022-11-29 NOTE — PT/OT/SLP PROGRESS
Physical Therapy Treatment    Patient Name:  Candy Oreilly   MRN:  [id number]    Recommendations:     Discharge Recommendations:  nursing facility, skilled   Discharge Equipment Recommendations: walker, rolling, 3-in-1 commode, shower chair   Barriers to discharge: None    Assessment:     Candy Oreilly is a 87 y.o. female admitted with a medical diagnosis of Brain mass.  She presents with the following impairments/functional limitations:  impaired functional mobility, impaired endurance, decreased safety awareness, decreased coordination, impaired balance Patient tolerated PT session well. Patient ambulated 40ft + 80ft with contact guard assistance . Patient performed sit to stands with contact guard assist. Patient demonstrated R shoe wear pattern showing increased inversion with initial contact of RLE that was apparent during gait. Patient exhibited no LOB episodes during ambulation. Patient reported feelings of midline orientation shifting to the right when ambulating. Patients gait distance limited due to endurance deficits and patients c/o SOB at end of gait trial.    Rehab Prognosis: Good; patient would benefit from acute skilled PT services to address these deficits and reach maximum level of function.    Recent Surgery: * No surgery found *      Plan:     During this hospitalization, patient to be seen 3 x/week to address the identified rehab impairments via gait training, therapeutic activities, therapeutic exercises, therapeutic groups and progress toward the following goals:    Plan of Care Expires:  12/26/22    Subjective     Chief Complaint: shortness of breath with increased activity and reports of feelings of midline shifting to the right  Patient/Family Comments/goals: Patient wishes to get back home  Pain/Comfort:  Pain Rating 1: 0/10  Pain Rating Post-Intervention 1: 0/10      Objective:     Communicated with nurse prior to session.  Patient found HOB elevated with telemetry upon PT entry  to room.     General Precautions: Standard, fall   Orthopedic Precautions:N/A   Braces: N/A  Respiratory Status: Room air     Functional Mobility:  Bed Mobility:     Supine to Sit: modified independent  Sit to Supine: modified independent  Transfers:     Sit to Stand:  3 separate trials of contact guard assistance with no AD  Gait: Patient ambulated 40ft and 80ft with No Assistive Device and contact guard assistance  using reciprocal gait. Patient demonstrated decreased carmen, decreased step length, increased stride width, increased lateral weight shifting, and deviation to the right during gait due to impaired balance and impaired endurance . Patient had increased shoe wear pattern on outside of R shoe suggesting excessive inversion at initial contact that was apparent during gait.  Patient performed 3 lateral steps to the right with CGA and no AD      AM-PAC 6 CLICK MOBILITY  Turning over in bed (including adjusting bedclothes, sheets and blankets)?: 4  Sitting down on and standing up from a chair with arms (e.g., wheelchair, bedside commode, etc.): 4  Moving from lying on back to sitting on the side of the bed?: 4  Moving to and from a bed to a chair (including a wheelchair)?: 3  Need to walk in hospital room?: 3  Climbing 3-5 steps with a railing?: 3  Basic Mobility Total Score: 21       Treatment & Education:  Patient educated in:  -PT role and POC  -safety with transfers including hand placement  -gait sequencing  -OOB activity to maximize recovery and effects of immobility  -safety with ADLs and home environment navigation  -to call nursing staff for supervision before attempting to get out of bed    Patient left supine with all lines intact, call button in reach, and nurse notified..    GOALS:   Multidisciplinary Problems       Physical Therapy Goals          Problem: Physical Therapy    Goal Priority Disciplines Outcome Goal Variances Interventions   Physical Therapy Goal     PT, PT/OT Ongoing,  Progressing     Description: PT goals to be met by: 12/16/22    Patient will perform supine <> sitting with independence.  Patient will perform sit <> stand transitions with supervision using RW.  Patient will perform transfers from bed <> chair or BSC with supervision using RW.  Patient will ambulate 100 feet with supervision using RW.                       Time Tracking:     PT Received On: 11/29/22  PT Start Time: 0858     PT Stop Time: 0921  PT Total Time (min): 23 min     Billable Minutes: Gait Training 15 and Therapeutic Activity 8    Treatment Type: Treatment  PT/PTA: PT     PTA Visit Number: 0     11/29/2022

## 2022-11-29 NOTE — PROGRESS NOTES
[facility] - Neurosurgery ([facility])  [facility] Medicine  Progress Note    Patient Name: Candy Oreilly  MRN: [id number]  Patient Class: IP- Inpatient   Admission Date: 11/23/2022  Length of Stay: 4 days  Attending Physician: Briana Hui MD  Primary Care Provider: Chata Hurley MD        Subjective:     Principal Problem:Brain mass        HPI:  88 yo female with CAD, GERD, thyroid disease, arthritis, anxiety presenting to outside hospital for difficulty with word finding and mild disorientation [place] [facility] [place] concern of L temporal brain mass. She has been having syncopal episodes as well during this past week as well during the word finding difficulties. She had some LLE tingling as well. This was not getting any better so her family brought her in for evaluation to outside ED. CT showing brain mass, transferred here for DEV vela. She denies any long history of smoking, she denies any weight loss fevers, no abnormal vaginal bleeding. She does occasionally get hemorrhoidal bleeding. She does report some diarrhea that last a while a few weeks ago, but has been clearing up. She denies any melena or BRBPR. She did have normal screening exams but those ended 2/2 age.     In ED, NSGY and NCC dane'd, approved for floor admission to medicine. Patient started on keppra and steroids. Medicine called for possible cancer workup.      Overview/Hospital Course:  On 11/25; CT C/A/P ordered yesterday. Result came back today showing;  1. Soft tissue mass in the left breast.  Malignancy not excluded.  Recommend correlation with physical exam and mammogram.   2. Few solid and ground-glass opacities in the lungs as detailed above measuring up to 0.6 cm.  Clinical and oncologic considerations will determine the role and schedule for continued surveillance.   3. Indeterminate small focus of sclerosis in the anterior C7 vertebral body.  Bone scan could provide further characterization if clinically indicated.   4.  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     Case was discussed with neurosurgery and oncology in details. Neurosurgery will defer brain bx since there is a breast mass that is more accessible for bx. Oncology will plan for breast tissue bx outpt.   Blood sugar levels are very high this morning, likely 2/2 dexamethasone. I ordered lantus and humalog sliding scale. Endo was consulted. Case was discussed with endo.    On 11/26:  Blood pressure was high yesterday due to anxiety.  Patient became anxious and upset after being diagnosed with possible metastatic breast cancer.  She was emotional, crying.  Multiple doses of extra medication were given including 2 doses of hydralazine 25 mg, as well as Coreg 6.25 mg b.i.d. was started.  This morning, blood pressure and mood are better.  Case was discussed with Endocrine, and adjustment to her insulin regimen were added.  I did spoke to PT/OT.  Patient will likely need skilled nursing facility.  I tried to reach neurosurgery to inquire about patient's DEXA regimen outpatient: But no answer as of yet.  I also consulted radiation oncology.    On 11/27; 24 h EEG extended from yesterday to today and it was negative.  Pending radiation oncology eval and SNF placement.    On 11/28; case discussed with neurosurgery, who recommended dexa 4q6h for one week followed by 2 week slow taper.   Case discussed with case management, pending SNF placement.        Interval History: feels ok. Pt today clarified that she doesn't have dizziness per say but rather blurry vision left eye.     Review of Systems   Constitutional:  Negative for chills and fever.   HENT:  Negative for sore throat.    Eyes:  Positive for visual disturbance.   Respiratory:  Negative for cough and shortness of breath.    Cardiovascular:  Negative for chest pain and palpitations.   Gastrointestinal:  Negative for abdominal pain.   Endocrine: Negative for cold intolerance, heat  intolerance, polydipsia and polyphagia.   Genitourinary:  Negative for dysuria, frequency and urgency.   Musculoskeletal:  Negative for back pain.   Neurological:  Negative for dizziness, syncope, facial asymmetry, weakness, numbness and headaches.        Forgetfulness, unsteady gait.    Objective:     Vital Signs (Most Recent):  Temp: 96.5 °F (35.8 °C) (11/28/22 1837)  Pulse: 66 (11/28/22 1915)  Resp: 16 (11/28/22 1837)  BP: (!) 166/79 (11/28/22 1837)  SpO2: 96 % (11/28/22 1837)   Vital Signs (24h Range):  Temp:  [96.5 °F (35.8 °C)-98.3 °F (36.8 °C)] 96.5 °F (35.8 °C)  Pulse:  [] 66  Resp:  [16-20] 16  SpO2:  [93 %-97 %] 96 %  BP: (142-173)/(65-79) 166/79     Weight: 92 kg (202 lb 13.2 oz)  Body mass index is 35.93 kg/m².    Intake/Output Summary (Last 24 hours) at 11/28/2022 1935  Last data filed at 11/28/2022 0642  Gross per 24 hour   Intake 240 ml   Output --   Net 240 ml        Physical Exam  Vitals reviewed.   Constitutional:       Appearance: Normal appearance.   HENT:      Head: Normocephalic and atraumatic.      Mouth/Throat:      Mouth: Mucous membranes are moist.   Eyes:      Extraocular Movements: Extraocular movements intact.      Pupils: Pupils are equal, round, and reactive to light.   Cardiovascular:      Rate and Rhythm: Normal rate and regular rhythm.   Pulmonary:      Effort: Pulmonary effort is normal.      Breath sounds: Normal breath sounds.   Abdominal:      General: Abdomen is flat. Bowel sounds are normal.      Palpations: Abdomen is soft.   Musculoskeletal:         General: Normal range of motion.      Cervical back: Normal range of motion and neck supple.   Skin:     General: Skin is warm.   Neurological:      General: No focal deficit present.      Mental Status: She is alert and oriented to person, place, and time.   Psychiatric:         Mood and Affect: Mood normal.         Behavior: Behavior normal.       MELD-Na score: 7 at 11/26/2022  5:41 AM  MELD score: 7 at 11/26/2022  5:41  AM  Calculated from:  Serum Creatinine: 0.8 mg/dL (Using min of 1 mg/dL) at 11/26/2022  5:41 AM  Serum Sodium: 136 mmol/L at 11/26/2022  5:41 AM  Total Bilirubin: 1.2 mg/dL at 11/26/2022  5:41 AM  INR(ratio): 1.0 at 11/24/2022  1:15 AM  Age: 87 years    Significant Labs:  CBC:  Recent Labs   Lab 11/27/22  0857 11/28/22  0614   WBC 13.32* 11.36   HGB 14.6 14.4   HCT 44.4 44.3    221       CMP:  Recent Labs   Lab 11/27/22  0857 11/28/22 0614   * 133*   K 3.9 4.0    101   CO2 20* 22*   * 292*   BUN 22 21   CREATININE 0.9 0.8   CALCIUM 8.8 8.6*   PROT 5.7* 5.4*   ALBUMIN 3.4* 3.1*   BILITOT 1.3* 1.0   ALKPHOS 75 67   AST 9* 10   ALT 16 16   ANIONGAP 12 10       PTINR:  No results for input(s): INR in the last 48 hours.          Assessment/Plan:      * Brain mass  Suspect metastatic given the left breast mass with possible lung mets seen on CT chest today.   Neurosurgery will defer brain bx since there is a breast mass that is more accessible for bx.   Pt had US guided left breast bx done today.   Continue dexamethasone 4 mg q6h for 7 days, then taper over 2 weeks per neurosurgery.  24 h EEG negative for seizure.    Mass of left breast  Suspect metastatic breast cancer.  Patient has what appears to be bilateral lung nodules, and also brain mass.  Pt had mammogram today and US guided left breast bx.     Type 2 diabetes mellitus without complication, without long-term current use of insulin  Acute, likely from steroid injection  Does not have history of diabetes prior to admission  HgbA1c came back at 8.2.  Case was discussed with endo.  Endo increased levemir from to to 18 units, and aspart from 7 to 9 units with meals. Keep sliding scale low.     Blurry vision, left eye  Will consult ophthalmogy.     Hypertension  continue coreg 6.25 mg bid. If bp continued to trend up, will increase coreg. Norvasc added today for better bp control.   Continue hydralazine prn for sbp >160.      Adverse effect  of glucocorticoid or synthetic analogue  Elevated blood sugar and HTN.   See plan of care of each.      Hypercholesterolemia  Chronic, controlled, continue statin      Gastroesophageal reflux disease  Chronic, controlled, continue PPI      Acquired hypothyroidism  Chronic, controlled, continue synthroid      Anxiety  Chronic, controlled, PRN benzo if needed      VTE Risk Mitigation (From admission, onward)           Ordered     IP VTE HIGH RISK PATIENT  Once         11/24/22 0232     Place sequential compression device  Until discontinued         11/24/22 0232     Reason for No Pharmacological VTE Prophylaxis  Once        Question:  Reasons:  Answer:  Risk of Bleeding    11/24/22 0232                    Discharge Planning   LOVE: 11/30/2022     Code Status: Full Code   Is the patient medically ready for discharge?:     Reason for patient still in hospital (select all that apply): Patient trending condition  Discharge Plan A: Skilled Nursing Facility        Case discussed with neurosurgery and CMS.      Briana Hui MD  Department of Hospital Medicine   [facility] - Neurosurgery ([facility])

## 2022-11-29 NOTE — SUBJECTIVE & OBJECTIVE
"Interval HPI:   Overnight events: Overnight no new complaints.  Continues to be on Dexamethasone.  Cardiac diet in place with no concerns for nausea or vomiting.  Only a mild headache this AM.  Regimen adjusted yesterday PM with some improvement seen but still above goal.   Eatin%  Nausea: No  Hypoglycemia and intervention: No  Fever: No  TPN and/or TF: No  If yes, type of TF/TPN and rate: N/A    /71   Pulse 81   Temp 97.7 °F (36.5 °C)   Resp 14   Ht 5' 3" (1.6 m)   Wt 92 kg (202 lb 13.2 oz)   LMP  (LMP Unknown)   SpO2 96%   BMI 35.93 kg/m²     Labs Reviewed and Include    Recent Labs   Lab 22  0455   *   CALCIUM 8.6*   ALBUMIN 3.1*   PROT 5.5*   *   K 4.2   CO2 26      BUN 19   CREATININE 0.8   ALKPHOS 67   ALT 15   AST 10   BILITOT 1.1*     Lab Results   Component Value Date    WBC 12.11 2022    HGB 14.5 2022    HCT 44.3 2022    MCV 87 2022     2022     No results for input(s): TSH, FREET4 in the last 168 hours.  Lab Results   Component Value Date    HGBA1C 8.2 (H) 2022       Nutritional status:   Body mass index is 35.93 kg/m².  Lab Results   Component Value Date    ALBUMIN 3.1 (L) 2022    ALBUMIN 3.1 (L) 2022    ALBUMIN 3.4 (L) 2022     No results found for: PREALBUMIN    Estimated Creatinine Clearance: 53.3 mL/min (based on SCr of 0.8 mg/dL).    Accu-Checks  Recent Labs     22  1626 22  2128 22  0849 22  1200 22  2042 22  0844 22  1136 22  1629 22  2210 22  0752   POCTGLUCOSE 265* 264* 326* 241* 248* 259* 274* 259* 178* 260*       Current Medications and/or Treatments Impacting Glycemic Control  Immunotherapy:    Immunosuppressants       None          Steroids:   Hormones (From admission, onward)      Start     Stop Route Frequency Ordered    22 0331  melatonin tablet 6 mg         -- Oral Nightly PRN 22 0232    22 0000  " dexAMETHasone injection 4 mg         -- IV Every 6 hours 11/23/22 7673          Pressors:    Autonomic Drugs (From admission, onward)      None          Hyperglycemia/Diabetes Medications:   Antihyperglycemics (From admission, onward)      Start     Stop Route Frequency Ordered    11/29/22 0900  insulin detemir U-100 pen 18 Units         -- SubQ Daily 11/28/22 1542    11/28/22 2100  insulin aspart U-100 pen 0-5 Units         -- SubQ Before meals & nightly 11/28/22 2035    11/28/22 1645  insulin aspart U-100 pen 9 Units         -- SubQ 3 times daily with meals 11/28/22 1214

## 2022-11-29 NOTE — SUBJECTIVE & OBJECTIVE
Interval History: feels ok. Pt today clarified that she doesn't have dizziness per say but rather blurry vision left eye.     Review of Systems   Constitutional:  Negative for chills and fever.   HENT:  Negative for sore throat.    Eyes:  Positive for visual disturbance.   Respiratory:  Negative for cough and shortness of breath.    Cardiovascular:  Negative for chest pain and palpitations.   Gastrointestinal:  Negative for abdominal pain.   Endocrine: Negative for cold intolerance, heat intolerance, polydipsia and polyphagia.   Genitourinary:  Negative for dysuria, frequency and urgency.   Musculoskeletal:  Negative for back pain.   Neurological:  Negative for dizziness, syncope, facial asymmetry, weakness, numbness and headaches.        Forgetfulness, unsteady gait.    Objective:     Vital Signs (Most Recent):  Temp: 96.5 °F (35.8 °C) (11/28/22 1837)  Pulse: 66 (11/28/22 1915)  Resp: 16 (11/28/22 1837)  BP: (!) 166/79 (11/28/22 1837)  SpO2: 96 % (11/28/22 1837)   Vital Signs (24h Range):  Temp:  [96.5 °F (35.8 °C)-98.3 °F (36.8 °C)] 96.5 °F (35.8 °C)  Pulse:  [] 66  Resp:  [16-20] 16  SpO2:  [93 %-97 %] 96 %  BP: (142-173)/(65-79) 166/79     Weight: 92 kg (202 lb 13.2 oz)  Body mass index is 35.93 kg/m².    Intake/Output Summary (Last 24 hours) at 11/28/2022 1935  Last data filed at 11/28/2022 0642  Gross per 24 hour   Intake 240 ml   Output --   Net 240 ml        Physical Exam  Vitals reviewed.   Constitutional:       Appearance: Normal appearance.   HENT:      Head: Normocephalic and atraumatic.      Mouth/Throat:      Mouth: Mucous membranes are moist.   Eyes:      Extraocular Movements: Extraocular movements intact.      Pupils: Pupils are equal, round, and reactive to light.   Cardiovascular:      Rate and Rhythm: Normal rate and regular rhythm.   Pulmonary:      Effort: Pulmonary effort is normal.      Breath sounds: Normal breath sounds.   Abdominal:      General: Abdomen is flat. Bowel sounds are  normal.      Palpations: Abdomen is soft.   Musculoskeletal:         General: Normal range of motion.      Cervical back: Normal range of motion and neck supple.   Skin:     General: Skin is warm.   Neurological:      General: No focal deficit present.      Mental Status: She is alert and oriented to person, place, and time.   Psychiatric:         Mood and Affect: Mood normal.         Behavior: Behavior normal.       MELD-Na score: 7 at 11/26/2022  5:41 AM  MELD score: 7 at 11/26/2022  5:41 AM  Calculated from:  Serum Creatinine: 0.8 mg/dL (Using min of 1 mg/dL) at 11/26/2022  5:41 AM  Serum Sodium: 136 mmol/L at 11/26/2022  5:41 AM  Total Bilirubin: 1.2 mg/dL at 11/26/2022  5:41 AM  INR(ratio): 1.0 at 11/24/2022  1:15 AM  Age: 87 years    Significant Labs:  CBC:  Recent Labs   Lab 11/27/22  0857 11/28/22  0614   WBC 13.32* 11.36   HGB 14.6 14.4   HCT 44.4 44.3    221       CMP:  Recent Labs   Lab 11/27/22  0857 11/28/22  0614   * 133*   K 3.9 4.0    101   CO2 20* 22*   * 292*   BUN 22 21   CREATININE 0.9 0.8   CALCIUM 8.8 8.6*   PROT 5.7* 5.4*   ALBUMIN 3.4* 3.1*   BILITOT 1.3* 1.0   ALKPHOS 75 67   AST 9* 10   ALT 16 16   ANIONGAP 12 10       PTINR:  No results for input(s): INR in the last 48 hours.

## 2022-11-30 PROBLEM — D72.829 LEUKOCYTOSIS: Status: ACTIVE | Noted: 2022-11-30

## 2022-11-30 LAB
ALBUMIN SERPL BCP-MCNC: 3.2 G/DL (ref 3.5–5.2)
ALP SERPL-CCNC: 72 U/L (ref 55–135)
ALT SERPL W/O P-5'-P-CCNC: 13 U/L (ref 10–44)
ANION GAP SERPL CALC-SCNC: 11 MMOL/L (ref 8–16)
AST SERPL-CCNC: 10 U/L (ref 10–40)
BACTERIA #/AREA URNS AUTO: NORMAL /HPF
BASOPHILS # BLD AUTO: 0.09 K/UL (ref 0–0.2)
BASOPHILS NFR BLD: 0.6 % (ref 0–1.9)
BILIRUB SERPL-MCNC: 1 MG/DL (ref 0.1–1)
BILIRUB UR QL STRIP: NEGATIVE
BUN SERPL-MCNC: 21 MG/DL (ref 8–23)
CALCIUM SERPL-MCNC: 8.8 MG/DL (ref 8.7–10.5)
CHLORIDE SERPL-SCNC: 100 MMOL/L (ref 95–110)
CLARITY UR REFRACT.AUTO: CLEAR
CO2 SERPL-SCNC: 21 MMOL/L (ref 23–29)
COLOR UR AUTO: YELLOW
CREAT SERPL-MCNC: 0.8 MG/DL (ref 0.5–1.4)
DIFFERENTIAL METHOD: ABNORMAL
EOSINOPHIL # BLD AUTO: 0 K/UL (ref 0–0.5)
EOSINOPHIL NFR BLD: 0.1 % (ref 0–8)
ERYTHROCYTE [DISTWIDTH] IN BLOOD BY AUTOMATED COUNT: 13.9 % (ref 11.5–14.5)
EST. GFR  (NO RACE VARIABLE): >60 ML/MIN/1.73 M^2
GLUCOSE SERPL-MCNC: 234 MG/DL (ref 70–110)
GLUCOSE UR QL STRIP: ABNORMAL
HCT VFR BLD AUTO: 47.8 % (ref 37–48.5)
HGB BLD-MCNC: 15.5 G/DL (ref 12–16)
HGB UR QL STRIP: ABNORMAL
IMM GRANULOCYTES # BLD AUTO: 0.55 K/UL (ref 0–0.04)
IMM GRANULOCYTES NFR BLD AUTO: 3.4 % (ref 0–0.5)
KETONES UR QL STRIP: ABNORMAL
LEUKOCYTE ESTERASE UR QL STRIP: NEGATIVE
LYMPHOCYTES # BLD AUTO: 2.1 K/UL (ref 1–4.8)
LYMPHOCYTES NFR BLD: 13.1 % (ref 18–48)
MCH RBC QN AUTO: 28.4 PG (ref 27–31)
MCHC RBC AUTO-ENTMCNC: 32.4 G/DL (ref 32–36)
MCV RBC AUTO: 88 FL (ref 82–98)
MICROSCOPIC COMMENT: NORMAL
MONOCYTES # BLD AUTO: 0.9 K/UL (ref 0.3–1)
MONOCYTES NFR BLD: 5.8 % (ref 4–15)
NEUTROPHILS # BLD AUTO: 12.6 K/UL (ref 1.8–7.7)
NEUTROPHILS NFR BLD: 77 % (ref 38–73)
NITRITE UR QL STRIP: NEGATIVE
NRBC BLD-RTO: 0 /100 WBC
PH UR STRIP: 5 [PH] (ref 5–8)
PLATELET # BLD AUTO: 238 K/UL (ref 150–450)
PMV BLD AUTO: 10.6 FL (ref 9.2–12.9)
POCT GLUCOSE: 195 MG/DL (ref 70–110)
POCT GLUCOSE: 298 MG/DL (ref 70–110)
POCT GLUCOSE: 314 MG/DL (ref 70–110)
POTASSIUM SERPL-SCNC: 4.3 MMOL/L (ref 3.5–5.1)
PROT SERPL-MCNC: 5.7 G/DL (ref 6–8.4)
PROT UR QL STRIP: NEGATIVE
RBC # BLD AUTO: 5.45 M/UL (ref 4–5.4)
RBC #/AREA URNS AUTO: 0 /HPF (ref 0–4)
SARS-COV-2 RNA RESP QL NAA+PROBE: NOT DETECTED
SODIUM SERPL-SCNC: 132 MMOL/L (ref 136–145)
SP GR UR STRIP: 1.02 (ref 1–1.03)
SQUAMOUS #/AREA URNS AUTO: 1 /HPF
URN SPEC COLLECT METH UR: ABNORMAL
WBC # BLD AUTO: 16.32 K/UL (ref 3.9–12.7)
WBC #/AREA URNS AUTO: 0 /HPF (ref 0–5)
YEAST UR QL AUTO: NORMAL

## 2022-11-30 PROCEDURE — 25000003 PHARM REV CODE 250: Performed by: INTERNAL MEDICINE

## 2022-11-30 PROCEDURE — 97530 THERAPEUTIC ACTIVITIES: CPT

## 2022-11-30 PROCEDURE — 25000003 PHARM REV CODE 250: Performed by: HOSPITALIST

## 2022-11-30 PROCEDURE — 85025 COMPLETE CBC W/AUTO DIFF WBC: CPT | Performed by: HOSPITALIST

## 2022-11-30 PROCEDURE — 80053 COMPREHEN METABOLIC PANEL: CPT | Performed by: HOSPITALIST

## 2022-11-30 PROCEDURE — 11000001 HC ACUTE MED/SURG PRIVATE ROOM

## 2022-11-30 PROCEDURE — 81001 URINALYSIS AUTO W/SCOPE: CPT | Performed by: HOSPITALIST

## 2022-11-30 PROCEDURE — 99232 PR SUBSEQUENT HOSPITAL CARE,LEVL II: ICD-10-PCS | Mod: GC,,, | Performed by: INTERNAL MEDICINE

## 2022-11-30 PROCEDURE — U0003 INFECTIOUS AGENT DETECTION BY NUCLEIC ACID (DNA OR RNA); SEVERE ACUTE RESPIRATORY SYNDROME CORONAVIRUS 2 (SARS-COV-2) (CORONAVIRUS DISEASE [COVID-19]), AMPLIFIED PROBE TECHNIQUE, MAKING USE OF HIGH THROUGHPUT TECHNOLOGIES AS DESCRIBED BY CMS-2020-01-R: HCPCS | Performed by: HOSPITALIST

## 2022-11-30 PROCEDURE — 99232 SBSQ HOSP IP/OBS MODERATE 35: CPT | Mod: GC,,, | Performed by: INTERNAL MEDICINE

## 2022-11-30 PROCEDURE — 63600175 PHARM REV CODE 636 W HCPCS: Performed by: HOSPITALIST

## 2022-11-30 PROCEDURE — 36415 COLL VENOUS BLD VENIPUNCTURE: CPT | Performed by: HOSPITALIST

## 2022-11-30 PROCEDURE — 99233 PR SUBSEQUENT HOSPITAL CARE,LEVL III: ICD-10-PCS | Mod: ,,, | Performed by: HOSPITALIST

## 2022-11-30 PROCEDURE — 99233 SBSQ HOSP IP/OBS HIGH 50: CPT | Mod: ,,, | Performed by: HOSPITALIST

## 2022-11-30 PROCEDURE — U0005 INFEC AGEN DETEC AMPLI PROBE: HCPCS | Performed by: HOSPITALIST

## 2022-11-30 PROCEDURE — 63600175 PHARM REV CODE 636 W HCPCS

## 2022-11-30 RX ORDER — TALC
6 POWDER (GRAM) TOPICAL NIGHTLY PRN
Refills: 0
Start: 2022-11-30

## 2022-11-30 RX ORDER — INSULIN ASPART 100 [IU]/ML
10 INJECTION, SOLUTION INTRAVENOUS; SUBCUTANEOUS 3 TIMES DAILY
Refills: 0
Start: 2022-11-30 | End: 2022-11-30 | Stop reason: SDUPTHER

## 2022-11-30 RX ORDER — INSULIN ASPART 100 [IU]/ML
0-5 INJECTION, SOLUTION INTRAVENOUS; SUBCUTANEOUS
Refills: 0
Start: 2022-11-30 | End: 2023-11-30

## 2022-11-30 RX ORDER — DEXAMETHASONE 4 MG/1
4 TABLET ORAL EVERY 8 HOURS
Status: DISCONTINUED | OUTPATIENT
Start: 2022-12-01 | End: 2022-12-01 | Stop reason: HOSPADM

## 2022-11-30 RX ORDER — CARVEDILOL 6.25 MG/1
6.25 TABLET ORAL 2 TIMES DAILY
Qty: 60 TABLET | Refills: 11
Start: 2022-11-30 | End: 2023-03-07 | Stop reason: SDUPTHER

## 2022-11-30 RX ORDER — DEXAMETHASONE 4 MG/1
4 TABLET ORAL SEE ADMIN INSTRUCTIONS
Qty: 20 TABLET | Refills: 0
Start: 2022-11-30 | End: 2022-12-10

## 2022-11-30 RX ORDER — AMLODIPINE BESYLATE 10 MG/1
10 TABLET ORAL DAILY
Qty: 30 TABLET | Refills: 11
Start: 2022-12-01 | End: 2023-03-07 | Stop reason: SDUPTHER

## 2022-11-30 RX ORDER — DEXAMETHASONE 1 MG/1
1 TABLET ORAL SEE ADMIN INSTRUCTIONS
Qty: 20 TABLET | Refills: 0
Start: 2022-12-10 | End: 2022-12-20

## 2022-11-30 RX ORDER — INSULIN ASPART 100 [IU]/ML
10 INJECTION, SOLUTION INTRAVENOUS; SUBCUTANEOUS
Status: DISCONTINUED | OUTPATIENT
Start: 2022-11-30 | End: 2022-12-01 | Stop reason: HOSPADM

## 2022-11-30 RX ORDER — MECLIZINE HCL 12.5 MG 12.5 MG/1
25 TABLET ORAL EVERY 6 HOURS PRN
Status: DISCONTINUED | OUTPATIENT
Start: 2022-11-30 | End: 2022-12-01 | Stop reason: HOSPADM

## 2022-11-30 RX ORDER — POLYETHYLENE GLYCOL 3350 17 G/17G
17 POWDER, FOR SOLUTION ORAL 2 TIMES DAILY PRN
Refills: 0
Start: 2022-11-30

## 2022-11-30 RX ORDER — PANTOPRAZOLE SODIUM 40 MG/1
40 TABLET, DELAYED RELEASE ORAL 2 TIMES DAILY
Start: 2022-11-30 | End: 2023-03-07 | Stop reason: SDUPTHER

## 2022-11-30 RX ORDER — TRAZODONE HYDROCHLORIDE 50 MG/1
25 TABLET ORAL NIGHTLY PRN
Start: 2022-11-30 | End: 2023-03-07 | Stop reason: SDUPTHER

## 2022-11-30 RX ORDER — CARVEDILOL 12.5 MG/1
12.5 TABLET ORAL 2 TIMES DAILY
Status: DISCONTINUED | OUTPATIENT
Start: 2022-11-30 | End: 2022-12-01 | Stop reason: HOSPADM

## 2022-11-30 RX ORDER — ONDANSETRON 4 MG/1
4 TABLET, ORALLY DISINTEGRATING ORAL EVERY 8 HOURS PRN
Start: 2022-11-30

## 2022-11-30 RX ORDER — DEXAMETHASONE 1 MG/1
1 TABLET ORAL EVERY 6 HOURS
Status: DISCONTINUED | OUTPATIENT
Start: 2022-11-30 | End: 2022-11-30

## 2022-11-30 RX ORDER — DEXAMETHASONE 4 MG/1
4 TABLET ORAL ONCE
Status: COMPLETED | OUTPATIENT
Start: 2022-11-30 | End: 2022-12-01

## 2022-11-30 RX ORDER — ALPRAZOLAM 0.5 MG/1
0.5 TABLET ORAL DAILY PRN
Qty: 3 TABLET | Refills: 0 | Status: SHIPPED | OUTPATIENT
Start: 2022-11-30 | End: 2022-12-01 | Stop reason: SDUPTHER

## 2022-11-30 RX ORDER — INSULIN ASPART 100 [IU]/ML
8 INJECTION, SOLUTION INTRAVENOUS; SUBCUTANEOUS 3 TIMES DAILY
Refills: 0
Start: 2022-11-30 | End: 2023-11-30

## 2022-11-30 RX ORDER — MECLIZINE HYDROCHLORIDE 25 MG/1
25 TABLET ORAL EVERY 6 HOURS PRN
Refills: 0
Start: 2022-11-30 | End: 2023-03-07 | Stop reason: SDUPTHER

## 2022-11-30 RX ORDER — HYDRALAZINE HYDROCHLORIDE 25 MG/1
25 TABLET, FILM COATED ORAL EVERY 8 HOURS PRN
Start: 2022-11-30 | End: 2023-11-30

## 2022-11-30 RX ORDER — IPRATROPIUM BROMIDE AND ALBUTEROL SULFATE 2.5; .5 MG/3ML; MG/3ML
3 SOLUTION RESPIRATORY (INHALATION) EVERY 6 HOURS PRN
Qty: 75 ML | Refills: 0
Start: 2022-11-30 | End: 2023-11-30

## 2022-11-30 RX ORDER — SIMETHICONE 80 MG
80 TABLET,CHEWABLE ORAL 4 TIMES DAILY PRN
Refills: 0
Start: 2022-11-30

## 2022-11-30 RX ORDER — DEXAMETHASONE 4 MG/1
4 TABLET ORAL EVERY 6 HOURS
Status: DISCONTINUED | OUTPATIENT
Start: 2022-11-30 | End: 2022-11-30

## 2022-11-30 RX ADMIN — DEXAMETHASONE 4 MG: 4 TABLET ORAL at 06:11

## 2022-11-30 RX ADMIN — HYPROMELLOSE 2910 1 DROP: 5 SOLUTION/ DROPS OPHTHALMIC at 09:11

## 2022-11-30 RX ADMIN — MECLIZINE HYDROCHLORIDE 25 MG: 12.5 TABLET ORAL at 11:11

## 2022-11-30 RX ADMIN — CARVEDILOL 6.25 MG: 6.25 TABLET, FILM COATED ORAL at 09:11

## 2022-11-30 RX ADMIN — LEVETIRACETAM 500 MG: 500 TABLET, FILM COATED ORAL at 09:11

## 2022-11-30 RX ADMIN — HYPROMELLOSE 2910 1 DROP: 5 SOLUTION/ DROPS OPHTHALMIC at 03:11

## 2022-11-30 RX ADMIN — ALPRAZOLAM 0.5 MG: 0.25 TABLET ORAL at 09:11

## 2022-11-30 RX ADMIN — AMLODIPINE BESYLATE 10 MG: 10 TABLET ORAL at 09:11

## 2022-11-30 RX ADMIN — PANTOPRAZOLE SODIUM 40 MG: 20 TABLET, DELAYED RELEASE ORAL at 09:11

## 2022-11-30 RX ADMIN — INSULIN ASPART 3 UNITS: 100 INJECTION, SOLUTION INTRAVENOUS; SUBCUTANEOUS at 09:11

## 2022-11-30 RX ADMIN — HYPROMELLOSE 2910 1 DROP: 5 SOLUTION/ DROPS OPHTHALMIC at 10:11

## 2022-11-30 RX ADMIN — Medication 6 MG: at 09:11

## 2022-11-30 RX ADMIN — INSULIN ASPART 10 UNITS: 100 INJECTION, SOLUTION INTRAVENOUS; SUBCUTANEOUS at 08:11

## 2022-11-30 RX ADMIN — INSULIN ASPART 1 UNITS: 100 INJECTION, SOLUTION INTRAVENOUS; SUBCUTANEOUS at 11:11

## 2022-11-30 RX ADMIN — LEVOTHYROXINE SODIUM 75 MCG: 75 TABLET ORAL at 09:11

## 2022-11-30 RX ADMIN — INSULIN ASPART 3 UNITS: 100 INJECTION, SOLUTION INTRAVENOUS; SUBCUTANEOUS at 07:11

## 2022-11-30 RX ADMIN — INSULIN ASPART 10 UNITS: 100 INJECTION, SOLUTION INTRAVENOUS; SUBCUTANEOUS at 05:11

## 2022-11-30 RX ADMIN — FLUTICASONE PROPIONATE 100 MCG: 50 SPRAY, METERED NASAL at 09:11

## 2022-11-30 RX ADMIN — DEXAMETHASONE SODIUM PHOSPHATE 4 MG: 4 INJECTION INTRA-ARTICULAR; INTRALESIONAL; INTRAMUSCULAR; INTRAVENOUS; SOFT TISSUE at 12:11

## 2022-11-30 RX ADMIN — INSULIN ASPART 4 UNITS: 100 INJECTION, SOLUTION INTRAVENOUS; SUBCUTANEOUS at 05:11

## 2022-11-30 RX ADMIN — INSULIN ASPART 10 UNITS: 100 INJECTION, SOLUTION INTRAVENOUS; SUBCUTANEOUS at 11:11

## 2022-11-30 RX ADMIN — CETIRIZINE HYDROCHLORIDE 5 MG: 5 TABLET, FILM COATED ORAL at 09:11

## 2022-11-30 RX ADMIN — DEXAMETHASONE 4 MG: 4 TABLET ORAL at 12:11

## 2022-11-30 RX ADMIN — CARVEDILOL 12.5 MG: 12.5 TABLET, FILM COATED ORAL at 09:11

## 2022-11-30 RX ADMIN — DEXAMETHASONE SODIUM PHOSPHATE 4 MG: 4 INJECTION INTRA-ARTICULAR; INTRALESIONAL; INTRAMUSCULAR; INTRAVENOUS; SOFT TISSUE at 06:11

## 2022-11-30 NOTE — PT/OT/SLP PROGRESS
"Occupational Therapy   Treatment    Name: Candy Oreilly  MRN: [id number]  Admitting Diagnosis:  Brain mass       Recommendations:     Discharge Recommendations: nursing facility, skilled  Discharge Equipment Recommendations:  walker, rolling, 3-in-1 commode, shower chair  Barriers to discharge:  Other (Comment) (fall risk)    Assessment:     Candy Oreilly is a 87 y.o. female with a medical diagnosis of Brain mass.  Pt tolerated session well and without incident, but she still requires assistance to safely perform ADLs and mobility.  She presents with the following. Performance deficits affecting function are weakness, impaired endurance, impaired functional mobility, gait instability, impaired self care skills, impaired balance, decreased coordination, impaired cardiopulmonary response to activity.     Rehab Prognosis:  Good; patient would benefit from acute skilled OT services to address these deficits and reach maximum level of function.       Plan:     Patient to be seen 3 x/week to address the above listed problems via self-care/home management, therapeutic activities, therapeutic exercises  Plan of Care Expires: 12/24/22  Plan of Care Reviewed with: patient    Subjective   "It's all in God's hands."  Pain/Comfort:  Pain Rating 1: 0/10  Pain Rating Post-Intervention 1: 0/10    Objective:     Communicated with: nursing prior to session.  Patient found HOB elevated with telemetry, bed alarm upon OT entry to room.    General Precautions: Standard, fall   Orthopedic Precautions:N/A   Braces: N/A  Respiratory Status: Room air     Occupational Performance:     Bed Mobility:    Patient completed Rolling/Turning to Right with stand by assistance  Patient completed Scooting/Bridging with stand by assistance  Patient completed Supine to Sit with stand by assistance  Patient completed Sit to Supine with stand by assistance     Functional Mobility/Transfers:  Patient completed Sit <> Stand Transfer with contact " guard assistance  with  no assistive device   Functional Mobility: Pt ambulated a functional household distance in her room and in the hallway with CGA with HHA and holding onto the handrail.  She reported dizziness and was returned to her room.     Activities of Daily Living:  Upper Body Dressing: Min A to bernarda back gown as a robe while sitting EOB    [facility] 6 Click ADL: 19    Treatment & Education:  Pt edu on role of OT, POC, safety when performing self care tasks, benefit of performing OOB activity, and safety when performing functional transfers and mobility.    - Self care tasks completed-- as noted above      Patient left HOB elevated with all lines intact, call button in reach, and bed alarm on    GOALS:   Multidisciplinary Problems       Occupational Therapy Goals          Problem: Occupational Therapy    Goal Priority Disciplines Outcome Interventions   Occupational Therapy Goal     OT, PT/OT Ongoing, Progressing    Description: Goals to be met by: 12/24/22     Patient will increase functional independence with ADLs by performing:    UE Dressing with Cook.  LE Dressing with Cook.  Grooming while standing with Cook.  Toileting from toilet with Cook for hygiene and clothing management.   Bathing from  shower chair/bench with Cook.                         Time Tracking:     OT Date of Treatment: 11/30/22  OT Start Time: 1510  OT Stop Time: 1530  OT Total Time (min): 20 min    Billable Minutes:Therapeutic Activity 20 min    OT/SEGUNDO: OT     SEGUNDO Visit Number: 0    11/30/2022

## 2022-11-30 NOTE — SUBJECTIVE & OBJECTIVE
Interval History: feels ok. No new complaints.    Review of Systems   Constitutional:  Negative for chills and fever.   HENT:  Negative for sore throat.    Eyes:  Positive for visual disturbance.   Respiratory:  Negative for cough and shortness of breath.    Cardiovascular:  Negative for chest pain and palpitations.   Gastrointestinal:  Negative for abdominal pain.   Endocrine: Negative for cold intolerance, heat intolerance, polydipsia and polyphagia.   Genitourinary:  Negative for dysuria, frequency and urgency.   Musculoskeletal:  Negative for back pain.   Neurological:  Negative for dizziness, syncope, facial asymmetry, weakness, numbness and headaches.        Forgetfulness, unsteady gait, hard time finding words occasionally.   Objective:     Vital Signs (Most Recent):  Temp: 97.9 °F (36.6 °C) (11/29/22 1726)  Pulse: 74 (11/29/22 1726)  Resp: 15 (11/29/22 1726)  BP: (!) 165/78 (11/29/22 1726)  SpO2: 96 % (11/29/22 1726)   Vital Signs (24h Range):  Temp:  [97.3 °F (36.3 °C)-97.9 °F (36.6 °C)] 97.9 °F (36.6 °C)  Pulse:  [50-83] 74  Resp:  [14-20] 15  SpO2:  [94 %-97 %] 96 %  BP: (135-184)/(68-81) 165/78     Weight: 92 kg (202 lb 13.2 oz)  Body mass index is 35.93 kg/m².    Intake/Output Summary (Last 24 hours) at 11/29/2022 1846  Last data filed at 11/29/2022 1200  Gross per 24 hour   Intake 500 ml   Output --   Net 500 ml        Physical Exam  Vitals reviewed.   Constitutional:       Appearance: Normal appearance.   HENT:      Head: Normocephalic and atraumatic.      Mouth/Throat:      Mouth: Mucous membranes are moist.   Eyes:      Extraocular Movements: Extraocular movements intact.      Pupils: Pupils are equal, round, and reactive to light.   Cardiovascular:      Rate and Rhythm: Normal rate and regular rhythm.   Pulmonary:      Effort: Pulmonary effort is normal.      Breath sounds: Normal breath sounds.   Abdominal:      General: Abdomen is flat. Bowel sounds are normal.      Palpations: Abdomen is soft.    Musculoskeletal:         General: Normal range of motion.      Cervical back: Normal range of motion and neck supple.   Skin:     General: Skin is warm.   Neurological:      General: No focal deficit present.      Mental Status: She is alert and oriented to person, place, and time.   Psychiatric:         Mood and Affect: Mood normal.         Behavior: Behavior normal.       MELD-Na score: 7 at 11/26/2022  5:41 AM  MELD score: 7 at 11/26/2022  5:41 AM  Calculated from:  Serum Creatinine: 0.8 mg/dL (Using min of 1 mg/dL) at 11/26/2022  5:41 AM  Serum Sodium: 136 mmol/L at 11/26/2022  5:41 AM  Total Bilirubin: 1.2 mg/dL at 11/26/2022  5:41 AM  INR(ratio): 1.0 at 11/24/2022  1:15 AM  Age: 87 years    Significant Labs:  CBC:  Recent Labs   Lab 11/28/22  0614 11/29/22  0455   WBC 11.36 12.11   HGB 14.4 14.5   HCT 44.3 44.3    246       CMP:  Recent Labs   Lab 11/28/22  0614 11/29/22  0455   * 135*   K 4.0 4.2    100   CO2 22* 26   * 260*   BUN 21 19   CREATININE 0.8 0.8   CALCIUM 8.6* 8.6*   PROT 5.4* 5.5*   ALBUMIN 3.1* 3.1*   BILITOT 1.0 1.1*   ALKPHOS 67 67   AST 10 10   ALT 16 15   ANIONGAP 10 9       PTINR:  No results for input(s): INR in the last 48 hours.

## 2022-11-30 NOTE — ASSESSMENT & PLAN NOTE
Acute, likely from steroid injection  Does not have history of diabetes prior to admission  HgbA1c came back at 8.2.  continue levemir at 18 units, and aspart at 9 units with meals. Keep sliding scale low.

## 2022-11-30 NOTE — NURSING
Pt is newly diagnosed with breast cancer with mets to the brain. Met with the patient to introduce myself and the cancer team. Pt is having difficulty retaining information which she is relating to the brain tumor. She requested I communicate through her daughter, Tyseha Oreilly (number in chart).     Pt will likely d/c to SNF and need to establish care with an oncologist after d/c from SNF. Although pt lives near [place], she will stay with her sister near [place]. They would like to receive care at the [facility]. I have tentatively scheduled them with Dr. Ortez, who has privileges at [facility] in [place]. Corinne Coniglio, Nurse Navigator, has been updated on her case and will verify she does not need anything else prior to the appt.     I have given the patient a cancer services guide, meet the team flyer, and my business card if they need anything in the meantime.  I updated Tyesha about these items and the plan moving forward.     Ms. Gupta would also like Tyesha (and all of her children) to have access to her chart and [facility] in order to help facilitate care. Invite sent to Tyesha for set up.    Future Appointments   Date Time Provider Department Center   12/12/2022 11:30 AM Grazyna Funes MD [place] [id number] [facility]   12/16/2022 11:00 AM Hattie Ortez MD [facility] GIOVANNY AndersonN, RN  Oncology Discharge Navigator

## 2022-11-30 NOTE — PLAN OF CARE
Problem: Adult Inpatient Plan of Care  Goal: Plan of Care Review  Outcome: Ongoing, Progressing  Goal: Patient-Specific Goal (Individualized)  Outcome: Ongoing, Progressing  Goal: Absence of Hospital-Acquired Illness or Injury  Outcome: Ongoing, Progressing  Goal: Optimal Comfort and Wellbeing  Outcome: Ongoing, Progressing  Goal: Readiness for Transition of Care  Outcome: Ongoing, Progressing     Problem: Fall Injury Risk  Goal: Absence of Fall and Fall-Related Injury  Outcome: Ongoing, Progressing     Problem: Diabetes Comorbidity  Goal: Blood Glucose Level Within Targeted Range  Outcome: Ongoing, Progressing   Poc and meds reviewed with patient.Patient is awake and alert, ambulates to bathroom under supervision.No event noted during this shift .U/A  collected, covid 19 test done.,Bed  is in low position, call light within reach and bed alarm on.Patient always call for assistance.

## 2022-11-30 NOTE — ASSESSMENT & PLAN NOTE
Seen by ophth today.  - Anterior exam and DFE within normal limits w/o evidence of masses/mets, no proptosis or incr iop, no optic nerve edema   - Pt w/ new DM diagnosis w/ A1c 8.2% 11/25/22, no retinopathy on exam   - Return precautions and RD precautions discussed, patient expressed understanding  - Start Artificial tears TID-QID prn for GUZMAN relief   - Patient can continue to follow w/ outside ophthalmologist, encouraged pt to be seen within 1-2mo of discharge

## 2022-11-30 NOTE — PROGRESS NOTES
[facility] - Neurosurgery ([facility])  [facility] Medicine  Progress Note    Patient Name: Candy Oreilly  MRN: [id number]  Patient Class: IP- Inpatient   Admission Date: 11/23/2022  Length of Stay: 5 days  Attending Physician: Briana Hui MD  Primary Care Provider: Chata Hurley MD        Subjective:     Principal Problem:Brain mass        HPI:  88 yo female with CAD, GERD, thyroid disease, arthritis, anxiety presenting to outside hospital for difficulty with word finding and mild disorientation transferred to [facility] for concern of L temporal brain mass. She has been having syncopal episodes as well during this past week as well during the word finding difficulties. She had some LLE tingling as well. This was not getting any better so her family brought her in for evaluation to outside ED. CT showing brain mass, transferred here for DEV vela. She denies any long history of smoking, she denies any weight loss fevers, no abnormal vaginal bleeding. She does occasionally get hemorrhoidal bleeding. She does report some diarrhea that last a while a few weeks ago, but has been clearing up. She denies any melena or BRBPR. She did have normal screening exams but those ended 2/2 age.     In ED, NSGY and NCC dane'd, approved for floor admission to medicine. Patient started on keppra and steroids. Medicine called for possible cancer workup.      Overview/Hospital Course:  On 11/25; CT C/A/P ordered yesterday. Result came back today showing;  1. Soft tissue mass in the left breast.  Malignancy not excluded.  Recommend correlation with physical exam and mammogram.   2. Few solid and ground-glass opacities in the lungs as detailed above measuring up to 0.6 cm.  Clinical and oncologic considerations will determine the role and schedule for continued surveillance.   3. Indeterminate small focus of sclerosis in the anterior C7 vertebral body.  Bone scan could provide further characterization if clinically indicated.   4.  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     Case was discussed with neurosurgery and oncology in details. Neurosurgery will defer brain bx since there is a breast mass that is more accessible for bx. Oncology will plan for breast tissue bx outpt.   Blood sugar levels are very high this morning, likely 2/2 dexamethasone. I ordered lantus and humalog sliding scale. Endo was consulted. Case was discussed with endo.    On 11/26:  Blood pressure was high yesterday due to anxiety.  Patient became anxious and upset after being diagnosed with possible metastatic breast cancer.  She was emotional, crying.  Multiple doses of extra medication were given including 2 doses of hydralazine 25 mg, as well as Coreg 6.25 mg b.i.d. was started.  This morning, blood pressure and mood are better.  Case was discussed with Endocrine, and adjustment to her insulin regimen were added.  I did spoke to PT/OT.  Patient will likely need skilled nursing facility.  I tried to reach neurosurgery to inquire about patient's DEXA regimen outpatient: But no answer as of yet.  I also consulted radiation oncology.    On 11/27; bp was elevated yesterday, norvasc increased from 5 to 10 mg today.  Ophth was consulted for the left eye blurry vision.     On 11/27; 24 h EEG extended from yesterday to today and it was negative.  Pending radiation oncology eval and SNF placement.    On 11/28; case discussed with neurosurgery, who recommended dexa 4q6h for one week followed by 2 week slow taper.   Had mammogram and US guided tissue bx done.  Case discussed with case management, pending SNF placement.    On 11/29; seen by ophth.  Pending SNF placement.      Interval History: feels ok. No new complaints.    Review of Systems   Constitutional:  Negative for chills and fever.   HENT:  Negative for sore throat.    Eyes:  Positive for visual disturbance.   Respiratory:  Negative for cough and shortness of breath.     Cardiovascular:  Negative for chest pain and palpitations.   Gastrointestinal:  Negative for abdominal pain.   Endocrine: Negative for cold intolerance, heat intolerance, polydipsia and polyphagia.   Genitourinary:  Negative for dysuria, frequency and urgency.   Musculoskeletal:  Negative for back pain.   Neurological:  Negative for dizziness, syncope, facial asymmetry, weakness, numbness and headaches.        Forgetfulness, unsteady gait, hard time finding words occasionally.   Objective:     Vital Signs (Most Recent):  Temp: 97.9 °F (36.6 °C) (11/29/22 1726)  Pulse: 74 (11/29/22 1726)  Resp: 15 (11/29/22 1726)  BP: (!) 165/78 (11/29/22 1726)  SpO2: 96 % (11/29/22 1726)   Vital Signs (24h Range):  Temp:  [97.3 °F (36.3 °C)-97.9 °F (36.6 °C)] 97.9 °F (36.6 °C)  Pulse:  [50-83] 74  Resp:  [14-20] 15  SpO2:  [94 %-97 %] 96 %  BP: (135-184)/(68-81) 165/78     Weight: 92 kg (202 lb 13.2 oz)  Body mass index is 35.93 kg/m².    Intake/Output Summary (Last 24 hours) at 11/29/2022 1846  Last data filed at 11/29/2022 1200  Gross per 24 hour   Intake 500 ml   Output --   Net 500 ml        Physical Exam  Vitals reviewed.   Constitutional:       Appearance: Normal appearance.   HENT:      Head: Normocephalic and atraumatic.      Mouth/Throat:      Mouth: Mucous membranes are moist.   Eyes:      Extraocular Movements: Extraocular movements intact.      Pupils: Pupils are equal, round, and reactive to light.   Cardiovascular:      Rate and Rhythm: Normal rate and regular rhythm.   Pulmonary:      Effort: Pulmonary effort is normal.      Breath sounds: Normal breath sounds.   Abdominal:      General: Abdomen is flat. Bowel sounds are normal.      Palpations: Abdomen is soft.   Musculoskeletal:         General: Normal range of motion.      Cervical back: Normal range of motion and neck supple.   Skin:     General: Skin is warm.   Neurological:      General: No focal deficit present.      Mental Status: She is alert and oriented  to person, place, and time.   Psychiatric:         Mood and Affect: Mood normal.         Behavior: Behavior normal.       MELD-Na score: 7 at 11/26/2022  5:41 AM  MELD score: 7 at 11/26/2022  5:41 AM  Calculated from:  Serum Creatinine: 0.8 mg/dL (Using min of 1 mg/dL) at 11/26/2022  5:41 AM  Serum Sodium: 136 mmol/L at 11/26/2022  5:41 AM  Total Bilirubin: 1.2 mg/dL at 11/26/2022  5:41 AM  INR(ratio): 1.0 at 11/24/2022  1:15 AM  Age: 87 years    Significant Labs:  CBC:  Recent Labs   Lab 11/28/22  0614 11/29/22  0455   WBC 11.36 12.11   HGB 14.4 14.5   HCT 44.3 44.3    246       CMP:  Recent Labs   Lab 11/28/22  0614 11/29/22  0455   * 135*   K 4.0 4.2    100   CO2 22* 26   * 260*   BUN 21 19   CREATININE 0.8 0.8   CALCIUM 8.6* 8.6*   PROT 5.4* 5.5*   ALBUMIN 3.1* 3.1*   BILITOT 1.0 1.1*   ALKPHOS 67 67   AST 10 10   ALT 16 15   ANIONGAP 10 9       PTINR:  No results for input(s): INR in the last 48 hours.          Assessment/Plan:      * Brain mass  Suspect metastatic given the left breast mass with possible lung mets seen on CT chest today.   Neurosurgery will defer brain bx since there is a breast mass that is more accessible for bx.   Pt had US guided left breast bx done today.   Continue dexamethasone 4 mg q6h for 7 days (6/7), then taper over 2 weeks per neurosurgery.  24 h EEG negative for seizure.    Mass of left breast  Suspect metastatic breast cancer.  Patient has what appears to be bilateral lung nodules, and also brain mass.  Pt had mammogram and US guided left breast bx done inpt on 11/28.     Type 2 diabetes mellitus without complication, without long-term current use of insulin  Acute, likely from steroid injection  Does not have history of diabetes prior to admission  HgbA1c came back at 8.2.  continue levemir at 18 units, and aspart at 9 units with meals. Keep sliding scale low.     Blurry vision, left eye  Seen by ophth today.  - Anterior exam and DFE within normal  limits w/o evidence of masses/mets, no proptosis or incr iop, no optic nerve edema   - Pt w/ new DM diagnosis w/ A1c 8.2% 11/25/22, no retinopathy on exam   - Return precautions and RD precautions discussed, patient expressed understanding  - Start Artificial tears TID-QID prn for GUZMAN relief   - Patient can continue to follow w/ outside ophthalmologist, encouraged pt to be seen within 1-2mo of discharge          Hypertension  continue coreg 6.25 mg bid.   Increased norvasc from 5 to 10 mg daily.  Continue hydralazine prn for sbp >160.      Adverse effect of glucocorticoid or synthetic analogue  Elevated blood sugar and HTN.   See plan of care of each.      Hypercholesterolemia  Chronic, controlled, continue statin      Gastroesophageal reflux disease  Chronic, controlled, continue PPI      Acquired hypothyroidism  Chronic, controlled, continue synthroid      Anxiety  Chronic, controlled, PRN benzo if needed        VTE Risk Mitigation (From admission, onward)           Ordered     IP VTE HIGH RISK PATIENT  Once         11/24/22 0232     Place sequential compression device  Until discontinued         11/24/22 0232     Reason for No Pharmacological VTE Prophylaxis  Once        Question:  Reasons:  Answer:  Risk of Bleeding    11/24/22 0232                    Discharge Planning   LOVE: 11/30/2022     Code Status: Full Code   Is the patient medically ready for discharge?:     Reason for patient still in hospital (select all that apply): Patient trending condition  Discharge Plan A: Skilled Nursing Facility          Pending SNF placement.      Briana Hui MD  Department of Hospital Medicine   [facility] - Neurosurgery ([facility])

## 2022-11-30 NOTE — PROGRESS NOTES
[facility] - Neurosurgery ([facility])  Endocrinology  Progress Note    Admit Date: 2022     Reason for Consult: Management of newly diagnosed T2DM, Hyperglycemia     Surgical Procedure and Date: n/a    Diabetes diagnosis year: 2022 (current admission)    Home Diabetes Medications:  none    How often checking glucose at home?  N/a    BG readings on regimen: n/a  Hypoglycemia on the regimen?  n/a  Missed doses on regimen?  n/a    Diabetes Complications include:     Hyperglycemia    Complicating diabetes co morbidities:   Active Cancer and CAD      HPI:   Patient is a 87 y.o. female with a diagnosis of CAD, GERD, and hypothyroidism presented on 2022 to OSH with 1 week of disorientation and word finding difficulties. She has occasional episodes of wanting to pass out with associated LLE tingling. CTH at OSH revealed temporal brain mass. She was transferred to [facility] on 2022 for neurosurgery evaluation for new brain mass. At [facility] CT CAP was obtained and revealed L breast soft tissue mass and pulmonary opacities, and sclerosis of C7. Heme/onc was consulted, they recommend biopsy of L breast mass outpatient. NSGY recommended Keppra and 7-day course of dexamethasone 4mg q6h.    Endocrinology was consulted for hyperglycemia and newly diagnosed DM.    No previous knowledge of DM. She has been experiencing polyuria and polydipsia at home however.  No family hx of diabetes  She has hypothyroidism (for many years per pt) and is on levothyroxine 75 mcg daily. TSH 2022 at OSH normal. She is taking LT4 correctly.     Lab Results   Component Value Date    HGBA1C 8.2 (H) 2022           Interval HPI:   Overnight events: Improved glucose levels overnight.  No new overnight issues.  Talks of DC today with steroid taper.   Eatin%  Nausea: No  Hypoglycemia and intervention: No  Fever: No  TPN and/or TF: No  If yes, type of TF/TPN and rate: N/A    BP (!) 157/72 (BP Location: Right arm, Patient Position:  "Lying)   Pulse 83   Temp 97.9 °F (36.6 °C) (Oral)   Resp 16   Ht 5' 3" (1.6 m)   Wt 92 kg (202 lb 13.2 oz)   LMP  (LMP Unknown)   SpO2 97%   BMI 35.93 kg/m²     Labs Reviewed and Include    Recent Labs   Lab 11/30/22  0442   *   CALCIUM 8.8   ALBUMIN 3.2*   PROT 5.7*   *   K 4.3   CO2 21*      BUN 21   CREATININE 0.8   ALKPHOS 72   ALT 13   AST 10   BILITOT 1.0     Lab Results   Component Value Date    WBC 16.32 (H) 11/30/2022    HGB 15.5 11/30/2022    HCT 47.8 11/30/2022    MCV 88 11/30/2022     11/30/2022     No results for input(s): TSH, FREET4 in the last 168 hours.  Lab Results   Component Value Date    HGBA1C 8.2 (H) 11/25/2022       Nutritional status:   Body mass index is 35.93 kg/m².  Lab Results   Component Value Date    ALBUMIN 3.2 (L) 11/30/2022    ALBUMIN 3.1 (L) 11/29/2022    ALBUMIN 3.1 (L) 11/28/2022     No results found for: PREALBUMIN    Estimated Creatinine Clearance: 53.3 mL/min (based on SCr of 0.8 mg/dL).    Accu-Checks  Recent Labs     11/27/22  2042 11/28/22  0844 11/28/22  1136 11/28/22  1629 11/28/22  2210 11/29/22  0752 11/29/22  1150 11/29/22  1626 11/29/22 2013 11/30/22  1159   POCTGLUCOSE 248* 259* 274* 259* 178* 260* 239* 189* 251* 195*       Current Medications and/or Treatments Impacting Glycemic Control  Immunotherapy:    Immunosuppressants       None          Steroids:   Hormones (From admission, onward)      Start     Stop Route Frequency Ordered    11/30/22 1200  dexAMETHasone tablet 4 mg         -- Oral Every 6 hours 11/30/22 1156    11/24/22 0331  melatonin tablet 6 mg         -- Oral Nightly PRN 11/24/22 0232          Pressors:    Autonomic Drugs (From admission, onward)      None          Hyperglycemia/Diabetes Medications:   Antihyperglycemics (From admission, onward)      Start     Stop Route Frequency Ordered    11/30/22 0900  insulin detemir U-100 pen 20 Units         -- SubQ Daily 11/30/22 0646    11/30/22 0715  insulin aspart U-100 pen " 10 Units         -- SubQ 3 times daily with meals 11/30/22 0645    11/28/22 2100  insulin aspart U-100 pen 0-5 Units         -- SubQ Before meals & nightly 11/28/22 2035            ASSESSMENT and PLAN    Adverse effect of glucocorticoid or synthetic analogue  Currently on dexamethasone 4mg q6h for brain mass per NSGY with steroid taper planned over 2 weeks at discharge  Will increase BG, especially prandial -- this prandial heavy insulin regimen currently   Please notify endocrine of any changes in discharge steroid plans  Should not need insulin near the end of her steroid taper.      Mass of left breast  New L breast mass and L temporal brain mass concerning for malignancy  Underwent imaging with biopsy of left breast mass 11/28/22 (path pending) and marker placement.  Management per primary    Type 2 diabetes mellitus without complication, without long-term current use of insulin  Newly diagnosed DM this admission, likely T2DM  Lab Results   Component Value Date    HGBA1C 8.2 (H) 11/25/2022     BG above goal  - increased both prandial and basal doses in the past 24 hours overnight.    Note prandial heavy regimen due to high dose corticosteroids currently (started this admission for brain mass. Previously no glucocorticoids outpatient)    While hospitalized:  - Continue Levemir to 20u qd  - Continue Aspart to 10u AC  - Continue low dose correction scale  - accuchecks ac/hs  - DM diet  - Will need all testing supplies prescribed at d/c. Additionally, pt should begin to learn how to check BG - can ask RN to allow pt to perform supervised accuchecks  - Hypoglycemia protocol in place  - If blood glucose greater than 300, please ask patient not to eat food or drink anything other than water until correctional insulin has brought it back below 250    ** Please notify Endocrine for any change and/or advance in diet**  ** Please call Endocrine for any BG related issues **    **Discharge Recommendations:  - Start patient  on Januvia 100 mg once daily in addition to the insulin taper as laid out below.     Day 1-6 (start tomorrow):   Januvia 100 mg daily   Levemir 18 units daily   Novolog 8 units TID with meals   Low dose correction ACHS    Day 7-9:   Januvia 100 mg daily   Levemir 12 units daily   Novolog 6 units TID with meals   Low dose correction ACHS    Day 10 and on:   Januvia 100 mg daily   Low dose correction ACHS only   (Stop scheduled Levemir and Novolog)       Should follow up with facility physician for further adjustments from there.  Would also recommend an ambulatory referral to diabetes education and endocrinology since this is a new diagnosis for the patient.  May be able to have diabetes treated with PCP alone in the future when off all steroids.          Acquired hypothyroidism  Pt reports she has had hypothyroidism for many years   Taking levothyroxine 75 mcg daily - no recent dose adjustments. She is taking it correctly, first thing in the morning without food or other meds.  TSH at OSH Normal  Continue home levothyroxine    Lab Results   Component Value Date    TSH 2.4840 11/21/2022             Jose Bello, DO  Endocrinology

## 2022-11-30 NOTE — ASSESSMENT & PLAN NOTE
Currently on dexamethasone 4mg q6h for brain mass per NSGY with steroid taper planned over 2 weeks at discharge  Will increase BG, especially prandial -- this prandial heavy insulin regimen currently   Please notify endocrine of any changes in discharge steroid plans  Should not need insulin near the end of her steroid taper.

## 2022-11-30 NOTE — PLAN OF CARE
[facility]    FACILITY TRANSFER ORDERS      Patient Name: Candy Oreilly  YOB: 1935    PCP: Chata Hurley MD   PCP Address: [address] / [place] [address]  PCP Phone Number: [phone]  PCP Fax: [phone]    Encounter Date: 12/01/2022    Admit to: SNF    Vital Signs:  Routine    Diagnoses:   Active Hospital Problems    Diagnosis  POA    *Brain mass [G93.89]  Yes    Mass of left breast [N63.20]  Yes     Priority: 2     Type 2 diabetes mellitus without complication, without long-term current use of insulin [E11.9]  Yes     Priority: 3     Blurry vision, left eye [H53.8]  Yes    Adverse effect of glucocorticoid or synthetic analogue [T38.0X5A]  No    Hypertension [I10]  Yes    Anxiety [F41.9]  Yes    Acquired hypothyroidism [E03.9]  Yes    Gastroesophageal reflux disease [K21.9]  Yes    Hypercholesterolemia [E78.00]  Yes      Resolved Hospital Problems   No resolved problems to display.       Allergies:  Review of patient's allergies indicates:   Allergen Reactions    Pregabalin Swelling    Atorvastatin Other (See Comments)     Muscle weakness      Dexlansoprazole Other (See Comments)     Other reaction(s): HAND SPASMS  Muscle weakness      Ezetimibe Other (See Comments)     Other reaction(s): MUSCLE PAIN  Muscle pain      Gabapentin Other (See Comments)     Other reaction(s): MAKES HER DIZZY  Dizziness      Onabotulinumtoxina      Medical botox    Oxybutynin Other (See Comments)     Other reaction(s): CANT PEE  Stopped urine flow      Rosuvastatin Other (See Comments)     Leg muscle pain      Sertraline Other (See Comments)     Shaking of hands      Meperidine Nausea Only       Diet: cardiac diet and diabetic diet: 2000 calorie    Activities: Activity as tolerated    Goals of Care Treatment Preferences:  Code Status: Full Code      Nursing: routine vitals. Routine blood sugar check. Diabetes education. Fall precaution.    CONSULTS:    Physical Therapy to  evaluate and treat.  and Occupational Therapy to evaluate and treat.    MISCELLANEOUS CARE:  Diabetes Care:   SN to perform and educate Diabetic management with blood glucose monitoring:, Fingerstick blood sugar AC and HS, and Report CBG < 60 or > 350 to physician.  Sliding scale  Blood Glucose  mg/dL    Units   150-200 1 unit    201-250 2 units    251-300 3 units    301-350 4 units    >350  5 units    Day 1-6 of steroid taper (start at time of discharge):          Januvia 100 mg daily          Levemir 18 units daily          Novolog 8 units TID with meals          Low dose correction ACHS     Day 7-9 of steroid taper:          Januvia 100 mg daily          Levemir 12 units daily          Novolog 6 units TID with meals          Low dose correction ACHS     Day 10 of steroid taper and on:          Januvia 100 mg daily          Low dose correction ACHS only          (Stop scheduled Levemir and Novolog)     Medications: Review discharge medications with patient and family and provide education.      Current Discharge Medication List        START taking these medications    Details   albuterol-ipratropium (DUO-NEB) 2.5 mg-0.5 mg/3 mL nebulizer solution Take 3 mLs by nebulization every 6 (six) hours as needed for Wheezing or Shortness of Breath. Rescue  Qty: 75 mL, Refills: 0      amLODIPine (NORVASC) 10 MG tablet Take 1 tablet (10 mg total) by mouth once daily.  Qty: 30 tablet, Refills: 11    Comments: .      artificial tears (ISOPTO TEARS) 0.5 % ophthalmic solution Place 1 drop into both eyes 3 (three) times daily.      carvediloL (COREG) 6.25 MG tablet Take 2 tablet (12.5 mg total) by mouth 2 (two) times daily.  Qty: 60 tablet, Refills: 11    Comments: .      hydrALAZINE (APRESOLINE) 25 MG tablet Take 1 tablet (25 mg total) by mouth every 8 (eight) hours as needed (sbp>160).    Comments: .      insulin aspart U-100 (NOVOLOG) 100 unit/mL (3 mL) InPn pen Inject 0-5 Units into the skin 4 (four) times daily before meals  and nightly. Low dose sliding scale as above.  Refills: 0      insulin aspart U-100 (NOVOLOG) 100 unit/mL (3 mL) InPn pen Inject 8 Units into the skin 3 (three) times daily.  Refills: 0      insulin detemir U-100 (LEVEMIR FLEXTOUCH) 100 unit/mL (3 mL) SubQ InPn pen Inject 18 Units into the skin once daily.  Refills: 0      meclizine (ANTIVERT) 25 mg tablet Take 1 tablet (25 mg total) by mouth every 6 (six) hours as needed for Dizziness.  Refills: 0      melatonin (MELATIN) 3 mg tablet Take 2 tablets (6 mg total) by mouth nightly as needed for Insomnia.  Refills: 0      ondansetron (ZOFRAN-ODT) 4 MG TbDL Take 1 tablet (4 mg total) by mouth every 8 (eight) hours as needed.      polyethylene glycol (GLYCOLAX) 17 gram PwPk Take 17 g by mouth 2 (two) times daily as needed.  Refills: 0      simethicone (MYLICON) 80 MG chewable tablet Take 1 tablet (80 mg total) by mouth 4 (four) times daily as needed.  Refills: 0      SITagliptin phosphate (JANUVIA) 100 MG Tab Take 1 tablet (100 mg total) by mouth once daily.  Qty: 90 tablet, Refills: 3      traZODone (DESYREL) 50 MG tablet Take 0.5 tablets (25 mg total) by mouth nightly as needed for Insomnia.       !! - Potential duplicate medications found. Please discuss with provider.        CONTINUE these medications which have CHANGED    Details   !! dexAMETHasone (DECADRON) 1 MG Tab Take 1 tablet (1 mg total) by mouth As instructed. Starting 12/10, take 1 table q12h for 3 days, followed by 1 tablet once a day q24 for 3 days then stop.  Qty: 20 tablet, Refills: 0      !! dexAMETHasone (DECADRON) 4 MG Tab Take 1 tablet (4 mg total) by mouth As instructed. Take 1 tablet q8h for 3 days, then 1 tablet q12h starting 12/4 for 3 days, then 1/2 tablet q12h starting 12/7 for 3 days.  Qty: 20 tablet, Refills: 0      pantoprazole (PROTONIX) 40 MG tablet Take 1 tablet (40 mg total) by mouth 2 (two) times daily.       !! - Potential duplicate medications found. Please discuss with provider.         CONTINUE these medications which have NOT CHANGED    Details   ALPRAZolam (XANAX) 0.5 MG tablet Take 0.5 mg by mouth daily as needed.      citalopram (CELEXA) 20 MG tablet Take 1 tablet by mouth once daily.      fexofenadine (ALLEGRA) 180 MG tablet Take 1 tablet by mouth once daily.      fluticasone propionate (FLONASE) 50 mcg/actuation nasal spray 1 spray by Each Nostril route once daily.      levETIRAcetam (KEPPRA) 500 MG Tab Take 1 tablet (500 mg total) by mouth 2 (two) times daily.  Qty: 60 tablet, Refills: 0      levothyroxine (SYNTHROID) 75 MCG tablet Take 1 tablet by mouth once daily.           STOP taking these medications       atenoloL (TENORMIN) 50 MG tablet Comments:   Reason for Stopping:         celecoxib (CELEBREX) 200 MG capsule Comments:   Reason for Stopping:         hydroCHLOROthiazide (HYDRODIURIL) 25 MG tablet Comments:   Reason for Stopping:                  Immunizations Administered as of 11/30/2022       No immunizations on file.                _________________________________  Briana Hui MD  11/30/2022

## 2022-11-30 NOTE — ASSESSMENT & PLAN NOTE
continue coreg 6.25 mg bid.   Increased norvasc from 5 to 10 mg daily.  Continue hydralazine prn for sbp >160.

## 2022-11-30 NOTE — DISCHARGE INSTRUCTIONS
Please take the dexa on a slow taper, 4 mg q6h today, followed by 4 mg q8h for 3 days, followed by 4 mg q12h for 3 days, followed by 2 mg q12h for 3 days, followed by 1 mg q12h for 3 days, followed by 1 mg q24h for 3 days then stop.     Follow up with oncology and neurosurgery, endocrinology, and diabetes education.    Your blood pressure and blood sugar will improve once dexamethasone is being tapered off.     Per the endo recommendation, please follow the current instructions;    **Discharge Recommendations:  - Start patient on Januvia 100 mg once daily in addition to the insulin taper as laid out below.      Day 1-6 (start tomorrow):          Januvia 100 mg daily          Levemir 18 units daily          Novolog 8 units TID with meals          Low dose correction ACHS     Day 7-9:          Januvia 100 mg daily          Levemir 12 units daily          Novolog 6 units TID with meals          Low dose correction ACHS     Day 10 and on:          Januvia 100 mg daily          Low dose correction ACHS only          (Stop scheduled Levemir and Novolog)

## 2022-11-30 NOTE — PLAN OF CARE
Patient will not discharge today as [facility] unable to accept as the paperwork has not been completed.

## 2022-11-30 NOTE — ASSESSMENT & PLAN NOTE
Newly diagnosed DM this admission, likely T2DM  Lab Results   Component Value Date    HGBA1C 8.2 (H) 11/25/2022     BG above goal  - increased both prandial and basal doses in the past 24 hours overnight.    Note prandial heavy regimen due to high dose corticosteroids currently (started this admission for brain mass. Previously no glucocorticoids outpatient)    While hospitalized:  - Continue Levemir to 20u qd  - Continue Aspart to 10u AC  - Continue low dose correction scale  - accuchecks ac/hs  - DM diet  - Will need all testing supplies prescribed at d/c. Additionally, pt should begin to learn how to check BG - can ask RN to allow pt to perform supervised accuchecks  - Hypoglycemia protocol in place  - If blood glucose greater than 300, please ask patient not to eat food or drink anything other than water until correctional insulin has brought it back below 250    ** Please notify Endocrine for any change and/or advance in diet**  ** Please call Endocrine for any BG related issues **    **Discharge Recommendations:  - Start patient on Januvia 100 mg once daily in addition to the insulin taper as laid out below.     Day 1-6 (start tomorrow):   Januvia 100 mg daily   Levemir 18 units daily   Novolog 8 units TID with meals   Low dose correction ACHS    Day 7-9:   Januvia 100 mg daily   Levemir 12 units daily   Novolog 6 units TID with meals   Low dose correction ACHS    Day 10 and on:   Januvia 100 mg daily   Low dose correction ACHS only   (Stop scheduled Levemir and Novolog)       Should follow up with facility physician for further adjustments from there.  Would also recommend an ambulatory referral to diabetes education and endocrinology since this is a new diagnosis for the patient.  May be able to have diabetes treated with PCP alone in the future when off all steroids.

## 2022-11-30 NOTE — ASSESSMENT & PLAN NOTE
Suspect metastatic breast cancer.  Patient has what appears to be bilateral lung nodules, and also brain mass.  Pt had mammogram and US guided left breast bx done inpt on 11/28.

## 2022-11-30 NOTE — SUBJECTIVE & OBJECTIVE
"Interval HPI:   Overnight events: Improved glucose levels overnight.  No new overnight issues.  Talks of DC today with steroid taper.   Eatin%  Nausea: No  Hypoglycemia and intervention: No  Fever: No  TPN and/or TF: No  If yes, type of TF/TPN and rate: N/A    BP (!) 157/72 (BP Location: Right arm, Patient Position: Lying)   Pulse 83   Temp 97.9 °F (36.6 °C) (Oral)   Resp 16   Ht 5' 3" (1.6 m)   Wt 92 kg (202 lb 13.2 oz)   LMP  (LMP Unknown)   SpO2 97%   BMI 35.93 kg/m²     Labs Reviewed and Include    Recent Labs   Lab 22  0442   *   CALCIUM 8.8   ALBUMIN 3.2*   PROT 5.7*   *   K 4.3   CO2 21*      BUN 21   CREATININE 0.8   ALKPHOS 72   ALT 13   AST 10   BILITOT 1.0     Lab Results   Component Value Date    WBC 16.32 (H) 2022    HGB 15.5 2022    HCT 47.8 2022    MCV 88 2022     2022     No results for input(s): TSH, FREET4 in the last 168 hours.  Lab Results   Component Value Date    HGBA1C 8.2 (H) 2022       Nutritional status:   Body mass index is 35.93 kg/m².  Lab Results   Component Value Date    ALBUMIN 3.2 (L) 2022    ALBUMIN 3.1 (L) 2022    ALBUMIN 3.1 (L) 2022     No results found for: PREALBUMIN    Estimated Creatinine Clearance: 53.3 mL/min (based on SCr of 0.8 mg/dL).    Accu-Checks  Recent Labs     22  2042 22  0844 22  1136 22  1629 22  2210 22  0752 22  1150 22  1626 22  1159   POCTGLUCOSE 248* 259* 274* 259* 178* 260* 239* 189* 251* 195*       Current Medications and/or Treatments Impacting Glycemic Control  Immunotherapy:    Immunosuppressants       None          Steroids:   Hormones (From admission, onward)      Start     Stop Route Frequency Ordered    22 1200  dexAMETHasone tablet 4 mg         -- Oral Every 6 hours 22 1156    22 0331  melatonin tablet 6 mg         -- Oral Nightly PRN 22 023      "     Pressors:    Autonomic Drugs (From admission, onward)      None          Hyperglycemia/Diabetes Medications:   Antihyperglycemics (From admission, onward)      Start     Stop Route Frequency Ordered    11/30/22 0900  insulin detemir U-100 pen 20 Units         -- SubQ Daily 11/30/22 0646    11/30/22 0715  insulin aspart U-100 pen 10 Units         -- SubQ 3 times daily with meals 11/30/22 0645    11/28/22 2100  insulin aspart U-100 pen 0-5 Units         -- SubQ Before meals & nightly 11/28/22 2035

## 2022-11-30 NOTE — ASSESSMENT & PLAN NOTE
Suspect metastatic given the left breast mass with possible lung mets seen on CT chest today.   Neurosurgery will defer brain bx since there is a breast mass that is more accessible for bx.   Pt had US guided left breast bx done today.   Continue dexamethasone 4 mg q6h for 7 days (6/7), then taper over 2 weeks per neurosurgery.  24 h EEG negative for seizure.

## 2022-12-01 ENCOUNTER — TELEPHONE (OUTPATIENT)
Dept: HEMATOLOGY/ONCOLOGY | Facility: CLINIC | Age: 87
End: 2022-12-01
Payer: MEDICARE

## 2022-12-01 ENCOUNTER — DOCUMENTATION ONLY (OUTPATIENT)
Dept: HEMATOLOGY/ONCOLOGY | Facility: CLINIC | Age: 87
End: 2022-12-01
Payer: MEDICARE

## 2022-12-01 VITALS
RESPIRATION RATE: 16 BRPM | SYSTOLIC BLOOD PRESSURE: 153 MMHG | HEART RATE: 77 BPM | HEIGHT: 63 IN | OXYGEN SATURATION: 95 % | TEMPERATURE: 98 F | WEIGHT: 202.81 LBS | BODY MASS INDEX: 35.93 KG/M2 | DIASTOLIC BLOOD PRESSURE: 72 MMHG

## 2022-12-01 LAB
ALBUMIN SERPL BCP-MCNC: 2.8 G/DL (ref 3.5–5.2)
ALP SERPL-CCNC: 64 U/L (ref 55–135)
ALT SERPL W/O P-5'-P-CCNC: 13 U/L (ref 10–44)
ANION GAP SERPL CALC-SCNC: 5 MMOL/L (ref 8–16)
AST SERPL-CCNC: 8 U/L (ref 10–40)
BASOPHILS # BLD AUTO: 0.09 K/UL (ref 0–0.2)
BASOPHILS NFR BLD: 0.6 % (ref 0–1.9)
BILIRUB SERPL-MCNC: 1 MG/DL (ref 0.1–1)
BUN SERPL-MCNC: 23 MG/DL (ref 8–23)
CALCIUM SERPL-MCNC: 8.3 MG/DL (ref 8.7–10.5)
CHLORIDE SERPL-SCNC: 99 MMOL/L (ref 95–110)
CO2 SERPL-SCNC: 28 MMOL/L (ref 23–29)
CREAT SERPL-MCNC: 0.8 MG/DL (ref 0.5–1.4)
DIFFERENTIAL METHOD: ABNORMAL
EOSINOPHIL # BLD AUTO: 0 K/UL (ref 0–0.5)
EOSINOPHIL NFR BLD: 0.1 % (ref 0–8)
ERYTHROCYTE [DISTWIDTH] IN BLOOD BY AUTOMATED COUNT: 13.5 % (ref 11.5–14.5)
EST. GFR  (NO RACE VARIABLE): >60 ML/MIN/1.73 M^2
GLUCOSE SERPL-MCNC: 232 MG/DL (ref 70–110)
HCT VFR BLD AUTO: 44.1 % (ref 37–48.5)
HGB BLD-MCNC: 14.8 G/DL (ref 12–16)
IMM GRANULOCYTES # BLD AUTO: 0.66 K/UL (ref 0–0.04)
IMM GRANULOCYTES NFR BLD AUTO: 4.4 % (ref 0–0.5)
LYMPHOCYTES # BLD AUTO: 2 K/UL (ref 1–4.8)
LYMPHOCYTES NFR BLD: 13.1 % (ref 18–48)
MCH RBC QN AUTO: 28.6 PG (ref 27–31)
MCHC RBC AUTO-ENTMCNC: 33.6 G/DL (ref 32–36)
MCV RBC AUTO: 85 FL (ref 82–98)
MONOCYTES # BLD AUTO: 1.1 K/UL (ref 0.3–1)
MONOCYTES NFR BLD: 7.1 % (ref 4–15)
NEUTROPHILS # BLD AUTO: 11.2 K/UL (ref 1.8–7.7)
NEUTROPHILS NFR BLD: 74.7 % (ref 38–73)
NRBC BLD-RTO: 0 /100 WBC
PLATELET # BLD AUTO: 252 K/UL (ref 150–450)
PMV BLD AUTO: 10.7 FL (ref 9.2–12.9)
POCT GLUCOSE: 169 MG/DL (ref 70–110)
POCT GLUCOSE: 176 MG/DL (ref 70–110)
POCT GLUCOSE: 281 MG/DL (ref 70–110)
POTASSIUM SERPL-SCNC: 4.1 MMOL/L (ref 3.5–5.1)
PROT SERPL-MCNC: 5.1 G/DL (ref 6–8.4)
RBC # BLD AUTO: 5.18 M/UL (ref 4–5.4)
SODIUM SERPL-SCNC: 132 MMOL/L (ref 136–145)
TB INDURATION 48 - 72 HR READ: 0 MM
WBC # BLD AUTO: 14.96 K/UL (ref 3.9–12.7)

## 2022-12-01 PROCEDURE — 85025 COMPLETE CBC W/AUTO DIFF WBC: CPT | Performed by: HOSPITALIST

## 2022-12-01 PROCEDURE — 99232 PR SUBSEQUENT HOSPITAL CARE,LEVL II: ICD-10-PCS | Mod: GC,,, | Performed by: INTERNAL MEDICINE

## 2022-12-01 PROCEDURE — 99239 HOSP IP/OBS DSCHRG MGMT >30: CPT | Mod: ,,, | Performed by: STUDENT IN AN ORGANIZED HEALTH CARE EDUCATION/TRAINING PROGRAM

## 2022-12-01 PROCEDURE — 25000003 PHARM REV CODE 250: Performed by: HOSPITALIST

## 2022-12-01 PROCEDURE — 63600175 PHARM REV CODE 636 W HCPCS: Performed by: HOSPITALIST

## 2022-12-01 PROCEDURE — 99232 SBSQ HOSP IP/OBS MODERATE 35: CPT | Mod: GC,,, | Performed by: INTERNAL MEDICINE

## 2022-12-01 PROCEDURE — 25000003 PHARM REV CODE 250: Performed by: INTERNAL MEDICINE

## 2022-12-01 PROCEDURE — 80053 COMPREHEN METABOLIC PANEL: CPT | Performed by: HOSPITALIST

## 2022-12-01 PROCEDURE — 99239 PR HOSPITAL DISCHARGE DAY,>30 MIN: ICD-10-PCS | Mod: ,,, | Performed by: STUDENT IN AN ORGANIZED HEALTH CARE EDUCATION/TRAINING PROGRAM

## 2022-12-01 PROCEDURE — 36415 COLL VENOUS BLD VENIPUNCTURE: CPT | Performed by: HOSPITALIST

## 2022-12-01 RX ORDER — ALPRAZOLAM 0.5 MG/1
0.5 TABLET ORAL DAILY PRN
Qty: 7 TABLET | Refills: 0 | Status: SHIPPED | OUTPATIENT
Start: 2022-12-01 | End: 2023-03-07 | Stop reason: SDUPTHER

## 2022-12-01 RX ORDER — ALPRAZOLAM 0.5 MG/1
0.5 TABLET ORAL DAILY PRN
Qty: 7 TABLET | Refills: 0 | Status: SHIPPED | OUTPATIENT
Start: 2022-12-01 | End: 2022-12-01 | Stop reason: SDUPTHER

## 2022-12-01 RX ADMIN — HYPROMELLOSE 2910 1 DROP: 5 SOLUTION/ DROPS OPHTHALMIC at 03:12

## 2022-12-01 RX ADMIN — CARVEDILOL 12.5 MG: 12.5 TABLET, FILM COATED ORAL at 09:12

## 2022-12-01 RX ADMIN — FLUTICASONE PROPIONATE 100 MCG: 50 SPRAY, METERED NASAL at 09:12

## 2022-12-01 RX ADMIN — INSULIN ASPART 10 UNITS: 100 INJECTION, SOLUTION INTRAVENOUS; SUBCUTANEOUS at 05:12

## 2022-12-01 RX ADMIN — DEXAMETHASONE 4 MG: 4 TABLET ORAL at 12:12

## 2022-12-01 RX ADMIN — LEVETIRACETAM 500 MG: 500 TABLET, FILM COATED ORAL at 09:12

## 2022-12-01 RX ADMIN — HYPROMELLOSE 2910 1 DROP: 5 SOLUTION/ DROPS OPHTHALMIC at 09:12

## 2022-12-01 RX ADMIN — INSULIN ASPART 1 UNITS: 100 INJECTION, SOLUTION INTRAVENOUS; SUBCUTANEOUS at 12:12

## 2022-12-01 RX ADMIN — PANTOPRAZOLE SODIUM 40 MG: 20 TABLET, DELAYED RELEASE ORAL at 09:12

## 2022-12-01 RX ADMIN — INSULIN ASPART 10 UNITS: 100 INJECTION, SOLUTION INTRAVENOUS; SUBCUTANEOUS at 12:12

## 2022-12-01 RX ADMIN — INSULIN ASPART 3 UNITS: 100 INJECTION, SOLUTION INTRAVENOUS; SUBCUTANEOUS at 08:12

## 2022-12-01 RX ADMIN — DEXAMETHASONE 4 MG: 4 TABLET ORAL at 08:12

## 2022-12-01 RX ADMIN — INSULIN ASPART 10 UNITS: 100 INJECTION, SOLUTION INTRAVENOUS; SUBCUTANEOUS at 08:12

## 2022-12-01 RX ADMIN — DEXAMETHASONE 4 MG: 4 TABLET ORAL at 03:12

## 2022-12-01 RX ADMIN — CETIRIZINE HYDROCHLORIDE 5 MG: 5 TABLET, FILM COATED ORAL at 09:12

## 2022-12-01 RX ADMIN — INSULIN ASPART 1 UNITS: 100 INJECTION, SOLUTION INTRAVENOUS; SUBCUTANEOUS at 05:12

## 2022-12-01 RX ADMIN — AMLODIPINE BESYLATE 10 MG: 10 TABLET ORAL at 09:12

## 2022-12-01 RX ADMIN — LEVOTHYROXINE SODIUM 75 MCG: 75 TABLET ORAL at 09:12

## 2022-12-01 NOTE — PLAN OF CARE
[facility] (H[facility])  Discharge Final Note    Primary Care Provider: Chata Hurley MD    Expected Discharge Date: 12/1/2022    Patient to be discharged to [facility].  Care deferred to [facility].  [facility] to provide wheelchair transportation.      Nurse to call report to [phone] CLARISSA Alcazar. Emilee is nurse.  Wheelchair requested for 3:30 pm which is not a guaranteed arrival time.     Final Discharge Note (most recent)       Final Note - 12/01/22 1431          Final Note    Assessment Type Final Discharge Note     Anticipated Discharge Disposition Skilled Nursing Facility        Post-Acute Status    Post-Acute Authorization Placement     Post-Acute Placement Status Set-up Complete/Auth obtained     Discharge Delays None known at this time                     Important Message from Medicare

## 2022-12-01 NOTE — ASSESSMENT & PLAN NOTE
Newly diagnosed DM this admission, likely T2DM  Lab Results   Component Value Date    HGBA1C 8.2 (H) 11/25/2022     BG above goal  - increased both prandial and basal doses in the past 24 hours overnight.    Note prandial heavy regimen due to high dose corticosteroids currently (started this admission for brain mass. Previously no glucocorticoids outpatient)    While hospitalized:  - Continue Levemir to 20u qd  - Continue Aspart to 10u AC  - Continue low dose correction scale  - accuchecks ac/hs  - DM diet  - Will need all testing supplies prescribed at d/c. Additionally, pt should begin to learn how to check BG - can ask RN to allow pt to perform supervised accuchecks  - Hypoglycemia protocol in place  - If blood glucose greater than 300, please ask patient not to eat food or drink anything other than water until correctional insulin has brought it back below 250    ** Please notify Endocrine for any change and/or advance in diet**  ** Please call Endocrine for any BG related issues **    **Discharge Recommendations:  - Start patient on Januvia 100 mg once daily in addition to the insulin taper as laid out below.     Day 1-6 of steroid taper (start at time of discharge):   Januvia 100 mg daily   Levemir 18 units daily   Novolog 8 units TID with meals   Low dose correction ACHS    Day 7-9 of steroid taper:   Januvia 100 mg daily   Levemir 12 units daily   Novolog 6 units TID with meals   Low dose correction ACHS    Day 10 of steroid taper and on:   Januvia 100 mg daily   Low dose correction ACHS only   (Stop scheduled Levemir and Novolog)       Should follow up with facility physician for further adjustments from there.  Would also recommend an ambulatory referral to diabetes education and endocrinology since this is a new diagnosis for the patient.  May be able to have diabetes treated with PCP alone in the future when off all steroids.

## 2022-12-01 NOTE — ASSESSMENT & PLAN NOTE
Will increase coreg from 6.25 to 12.5 mg bid today.   Increased norvasc from 5 to 10 mg daily on 11/29.  Continue hydralazine prn for sbp >160.

## 2022-12-01 NOTE — PROGRESS NOTES
Gilberto Reid - Neurosurgery ([facility])  [facility] Medicine  Progress Note    Patient Name: Candy Oreilly  MRN: [id number]  Patient Class: IP- Inpatient   Admission Date: 11/23/2022  Length of Stay: 6 days  Attending Physician: Briana Hui MD  Primary Care Provider: Chata Hurley MD        Subjective:     Principal Problem:Brain mass        HPI:  86 yo female with CAD, GERD, thyroid disease, arthritis, anxiety presenting to outside hospital for difficulty with word finding and mild disorientation transferred to [facility] for concern of L temporal brain mass. She has been having syncopal episodes as well during this past week as well during the word finding difficulties. She had some LLE tingling as well. This was not getting any better so her family brought her in for evaluation to outside ED. CT showing brain mass, transferred here for DEV vela. She denies any long history of smoking, she denies any weight loss fevers, no abnormal vaginal bleeding. She does occasionally get hemorrhoidal bleeding. She does report some diarrhea that last a while a few weeks ago, but has been clearing up. She denies any melena or BRBPR. She did have normal screening exams but those ended 2/2 age.     In ED, NSGY and NCC dane'd, approved for floor admission to medicine. Patient started on keppra and steroids. Medicine called for possible cancer workup.      Overview/Hospital Course:  On 11/25; CT C/A/P ordered yesterday. Result came back today showing;  1. Soft tissue mass in the left breast.  Malignancy not excluded.  Recommend correlation with physical exam and mammogram.   2. Few solid and ground-glass opacities in the lungs as detailed above measuring up to 0.6 cm.  Clinical and oncologic considerations will determine the role and schedule for continued surveillance.   3. Indeterminate small focus of sclerosis in the anterior C7 vertebral body.  Bone scan could provide further characterization if clinically indicated.   4.  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     Case was discussed with neurosurgery and oncology in details. Neurosurgery will defer brain bx since there is a breast mass that is more accessible for bx. Oncology will plan for breast tissue bx outpt.   Blood sugar levels are very high this morning, likely 2/2 dexamethasone. I ordered lantus and humalog sliding scale. Endo was consulted. Case was discussed with endo.    On 11/26:  Blood pressure was high yesterday due to anxiety.  Patient became anxious and upset after being diagnosed with possible metastatic breast cancer.  She was emotional, crying.  Multiple doses of extra medication were given including 2 doses of hydralazine 25 mg, as well as Coreg 6.25 mg b.i.d. was started.  This morning, blood pressure and mood are better.  Case was discussed with Endocrine, and adjustment to her insulin regimen were added.  I did spoke to PT/OT.  Patient will likely need skilled nursing facility.  I tried to reach neurosurgery to inquire about patient's DEXA regimen outpatient: But no answer as of yet.  I also consulted radiation oncology.    On 11/27; bp was elevated yesterday, norvasc increased from 5 to 10 mg today.  Ophth was consulted for the left eye blurry vision.     On 11/27; 24 h EEG extended from yesterday to today and it was negative.  Pending radiation oncology eval and SNF placement.    On 11/28; case discussed with neurosurgery, who recommended dexa 4q6h for one week followed by 2 week slow taper.   Had mammogram and US guided tissue bx done.  Case discussed with case management, pending SNF placement.    On 11/29; seen by ophth.  Pending SNF placement.    On 11/30; bp still on the high side, increased coreg from 6.25 to 12.5 mg bid.   Pending SNF placement.      Interval History:  Has dizziness and left eye blurry vision.  According to the patient, she has been having the symptoms on and off for over a month.    It  was noted that she had leukocytosis today, so on review of systems, patient has been having frequency and urgency for over 1 year.  She has been having cough, itchy throat secondary to postnasal drip for months.    Review of Systems   Constitutional:  Positive for activity change and fatigue. Negative for chills and fever.   HENT:  Positive for postnasal drip. Negative for sore throat.    Eyes:  Positive for visual disturbance.   Respiratory:  Positive for cough and shortness of breath.    Cardiovascular:  Negative for chest pain and palpitations.   Gastrointestinal:  Negative for abdominal pain.   Endocrine: Negative for cold intolerance, heat intolerance, polydipsia and polyphagia.   Genitourinary:  Positive for frequency and urgency. Negative for dysuria.   Musculoskeletal:  Negative for back pain.   Neurological:  Negative for dizziness, syncope, facial asymmetry, weakness, numbness and headaches.        Forgetfulness, unsteady gait, hard time finding words occasionally.   Objective:     Vital Signs (Most Recent):  Temp: 97.5 °F (36.4 °C) (11/30/22 1917)  Pulse: 88 (11/30/22 1917)  Resp: 18 (11/30/22 1917)  BP: (!) 148/75 (11/30/22 1917)  SpO2: (!) 92 % (11/30/22 1917)   Vital Signs (24h Range):  Temp:  [97.5 °F (36.4 °C)-98.3 °F (36.8 °C)] 97.5 °F (36.4 °C)  Pulse:  [52-95] 88  Resp:  [16-18] 18  SpO2:  [92 %-97 %] 92 %  BP: (140-162)/(72-77) 148/75     Weight: 92 kg (202 lb 13.2 oz)  Body mass index is 35.93 kg/m².  No intake or output data in the 24 hours ending 11/30/22 1953     Physical Exam  Vitals reviewed.   Constitutional:       Appearance: Normal appearance.   HENT:      Head: Normocephalic and atraumatic.      Mouth/Throat:      Mouth: Mucous membranes are moist.   Eyes:      Extraocular Movements: Extraocular movements intact.      Pupils: Pupils are equal, round, and reactive to light.   Cardiovascular:      Rate and Rhythm: Normal rate and regular rhythm.   Pulmonary:      Effort: Pulmonary effort  is normal.      Breath sounds: Normal breath sounds.   Abdominal:      General: Abdomen is flat. Bowel sounds are normal.      Palpations: Abdomen is soft.   Musculoskeletal:         General: Normal range of motion.      Cervical back: Normal range of motion and neck supple.   Skin:     General: Skin is warm.   Neurological:      General: No focal deficit present.      Mental Status: She is alert and oriented to person, place, and time.   Psychiatric:         Mood and Affect: Mood normal.         Behavior: Behavior normal.       MELD-Na score: 7 at 11/26/2022  5:41 AM  MELD score: 7 at 11/26/2022  5:41 AM  Calculated from:  Serum Creatinine: 0.8 mg/dL (Using min of 1 mg/dL) at 11/26/2022  5:41 AM  Serum Sodium: 136 mmol/L at 11/26/2022  5:41 AM  Total Bilirubin: 1.2 mg/dL at 11/26/2022  5:41 AM  INR(ratio): 1.0 at 11/24/2022  1:15 AM  Age: 87 years    Significant Labs:  CBC:  Recent Labs   Lab 11/29/22 0455 11/30/22 0442   WBC 12.11 16.32*   HGB 14.5 15.5   HCT 44.3 47.8    238       CMP:  Recent Labs   Lab 11/29/22 0455 11/30/22 0442   * 132*   K 4.2 4.3    100   CO2 26 21*   * 234*   BUN 19 21   CREATININE 0.8 0.8   CALCIUM 8.6* 8.8   PROT 5.5* 5.7*   ALBUMIN 3.1* 3.2*   BILITOT 1.1* 1.0   ALKPHOS 67 72   AST 10 10   ALT 15 13   ANIONGAP 9 11       PTINR:  No results for input(s): INR in the last 48 hours.          Assessment/Plan:      * Brain mass  Suspect metastatic given the left breast mass with possible lung mets seen on CT chest today.   Neurosurgery will defer brain bx since there is a breast mass that is more accessible for bx.   Pt had US guided left breast bx done on 11/28.  Continue dexamethasone 4 mg q6h for 7 days (7/7), then taper over 2 weeks per neurosurgery (will start 4 mg q8h tomorrow).  24 h EEG negative for seizure.  - Rad onc consulted - no urgent need for radiation at this time, recommend review at CNS tumor board  - SBP <160  - Keppra 500 BID  - pt will need  neurosurgery and oncology follow up on discharge.    Mass of left breast  Suspect metastatic breast cancer.  Patient has what appears to be bilateral lung nodules, and also brain mass.  Pt had mammogram and US guided left breast bx done inpt on 11/28. Pathology still pending.     Type 2 diabetes mellitus without complication, without long-term current use of insulin  Does not have history of diabetes prior to admission  HgbA1c came back at 8.2.  continue levemir at 20 units, and aspart at 10 units with meals. Keep sliding scale low.     Leukocytosis  I ordered UA and cxr today.  UA is negative  covid is negative  cxr is negative for pneumonia.   Suspect reactive 2/2 steroid, stress and breast bx. Monitor.           Blurry vision, left eye  Seen by ophth today.  - Anterior exam and DFE within normal limits w/o evidence of masses/mets, no proptosis or incr iop, no optic nerve edema   - Pt w/ new DM diagnosis w/ A1c 8.2% 11/25/22, no retinopathy on exam   - Return precautions and RD precautions discussed, patient expressed understanding  - Start Artificial tears TID-QID prn for GUZMAN relief   - Patient can continue to follow w/ outside ophthalmologist, encouraged pt to be seen within 1-2mo of discharge          Hypertension  Will increase coreg from 6.25 to 12.5 mg bid today.   Increased norvasc from 5 to 10 mg daily on 11/29.  Continue hydralazine prn for sbp >160.      Adverse effect of glucocorticoid or synthetic analogue  Elevated blood sugar and HTN.   See plan of care of each.      Hypercholesterolemia  Chronic, controlled, continue statin      Gastroesophageal reflux disease  Chronic, controlled, continue PPI      Acquired hypothyroidism  Chronic, controlled, continue synthroid      Anxiety  Chronic, controlled, PRN benzo if needed      VTE Risk Mitigation (From admission, onward)           Ordered     IP VTE HIGH RISK PATIENT  Once         11/24/22 0232     Place sequential compression device  Until discontinued          11/24/22 0232     Reason for No Pharmacological VTE Prophylaxis  Once        Question:  Reasons:  Answer:  Risk of Bleeding    11/24/22 0232                    Discharge Planning   LOVE: 12/1/2022     Code Status: Full Code   Is the patient medically ready for discharge?: Yes    Reason for patient still in hospital (select all that apply): Patient trending condition  Discharge Plan A: Skilled Nursing Facility          I have spent more than 25 min taking care of the pt today with more than 50% of that time was spent coordinating care and reviewing records. Case was discussed with oncology to arrange her an outpt appt, endo, and SW multiple times.        Briana Hui MD  Department of Hospital Medicine   [facility] - Neurosurgery ([facility])

## 2022-12-01 NOTE — SUBJECTIVE & OBJECTIVE
Interval History:  Has dizziness and left eye blurry vision.  According to the patient, she has been having the symptoms on and off for over a month.    It was noted that she had leukocytosis today, so on review of systems, patient has been having frequency and urgency for over 1 year.  She has been having cough, itchy throat secondary to postnasal drip for months.    Review of Systems   Constitutional:  Positive for activity change and fatigue. Negative for chills and fever.   HENT:  Positive for postnasal drip. Negative for sore throat.    Eyes:  Positive for visual disturbance.   Respiratory:  Positive for cough and shortness of breath.    Cardiovascular:  Negative for chest pain and palpitations.   Gastrointestinal:  Negative for abdominal pain.   Endocrine: Negative for cold intolerance, heat intolerance, polydipsia and polyphagia.   Genitourinary:  Positive for frequency and urgency. Negative for dysuria.   Musculoskeletal:  Negative for back pain.   Neurological:  Negative for dizziness, syncope, facial asymmetry, weakness, numbness and headaches.        Forgetfulness, unsteady gait, hard time finding words occasionally.   Objective:     Vital Signs (Most Recent):  Temp: 97.5 °F (36.4 °C) (11/30/22 1917)  Pulse: 88 (11/30/22 1917)  Resp: 18 (11/30/22 1917)  BP: (!) 148/75 (11/30/22 1917)  SpO2: (!) 92 % (11/30/22 1917)   Vital Signs (24h Range):  Temp:  [97.5 °F (36.4 °C)-98.3 °F (36.8 °C)] 97.5 °F (36.4 °C)  Pulse:  [52-95] 88  Resp:  [16-18] 18  SpO2:  [92 %-97 %] 92 %  BP: (140-162)/(72-77) 148/75     Weight: 92 kg (202 lb 13.2 oz)  Body mass index is 35.93 kg/m².  No intake or output data in the 24 hours ending 11/30/22 1953     Physical Exam  Vitals reviewed.   Constitutional:       Appearance: Normal appearance.   HENT:      Head: Normocephalic and atraumatic.      Mouth/Throat:      Mouth: Mucous membranes are moist.   Eyes:      Extraocular Movements: Extraocular movements intact.      Pupils: Pupils  are equal, round, and reactive to light.   Cardiovascular:      Rate and Rhythm: Normal rate and regular rhythm.   Pulmonary:      Effort: Pulmonary effort is normal.      Breath sounds: Normal breath sounds.   Abdominal:      General: Abdomen is flat. Bowel sounds are normal.      Palpations: Abdomen is soft.   Musculoskeletal:         General: Normal range of motion.      Cervical back: Normal range of motion and neck supple.   Skin:     General: Skin is warm.   Neurological:      General: No focal deficit present.      Mental Status: She is alert and oriented to person, place, and time.   Psychiatric:         Mood and Affect: Mood normal.         Behavior: Behavior normal.       MELD-Na score: 7 at 11/26/2022  5:41 AM  MELD score: 7 at 11/26/2022  5:41 AM  Calculated from:  Serum Creatinine: 0.8 mg/dL (Using min of 1 mg/dL) at 11/26/2022  5:41 AM  Serum Sodium: 136 mmol/L at 11/26/2022  5:41 AM  Total Bilirubin: 1.2 mg/dL at 11/26/2022  5:41 AM  INR(ratio): 1.0 at 11/24/2022  1:15 AM  Age: 87 years    Significant Labs:  CBC:  Recent Labs   Lab 11/29/22  0455 11/30/22 0442   WBC 12.11 16.32*   HGB 14.5 15.5   HCT 44.3 47.8    238       CMP:  Recent Labs   Lab 11/29/22  0455 11/30/22 0442   * 132*   K 4.2 4.3    100   CO2 26 21*   * 234*   BUN 19 21   CREATININE 0.8 0.8   CALCIUM 8.6* 8.8   PROT 5.5* 5.7*   ALBUMIN 3.1* 3.2*   BILITOT 1.1* 1.0   ALKPHOS 67 72   AST 10 10   ALT 15 13   ANIONGAP 9 11       PTINR:  No results for input(s): INR in the last 48 hours.

## 2022-12-01 NOTE — ASSESSMENT & PLAN NOTE
Suspect metastatic given the left breast mass with possible lung mets seen on CT chest today.   Neurosurgery will defer brain bx since there is a breast mass that is more accessible for bx.   Pt had US guided left breast bx done on 11/28.  Continue dexamethasone 4 mg q6h for 7 days (7/7), then taper over 2 weeks per neurosurgery (will start 4 mg q8h tomorrow).  24 h EEG negative for seizure.

## 2022-12-01 NOTE — TELEPHONE ENCOUNTER
Called Patient's dtr with results of breast biopsy from 11/28/22.  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 Discussed what this means and that the next step is to meet with a breast surgeon. An appt was made for 12/12/22 with Dr. White.  Reviewed location of breast center. Patient verbalized understanding.

## 2022-12-01 NOTE — ASSESSMENT & PLAN NOTE
I ordered UA and cxr today.  UA is negative  covid is negative  cxr is negative for pneumonia.   Suspect reactive 2/2 steroid, stress and breast bx. Monitor.

## 2022-12-01 NOTE — PT/OT/SLP PROGRESS
Physical Therapy      Patient Name:  Candy Oreilly   MRN:  [id number]    Patient not seen today secondary to pt discharging later today. No charges made.

## 2022-12-01 NOTE — ASSESSMENT & PLAN NOTE
Does not have history of diabetes prior to admission  HgbA1c came back at 8.2.  continue levemir at 20 units, and aspart at 10 units with meals. Keep sliding scale low.

## 2022-12-01 NOTE — SUBJECTIVE & OBJECTIVE
"Interval HPI:   Overnight events: Continued hyperglycemia while on steroids with plans to wean soon.  Possible DC today with taper of steroids.  No new overnight complaints.  No hypoglycemia overnight.    Eatin%  Nausea: No  Hypoglycemia and intervention: No  Fever: No  TPN and/or TF: No  If yes, type of TF/TPN and rate: NA    BP (!) 148/66 (BP Location: Right arm, Patient Position: Lying)   Pulse 77   Temp 97.6 °F (36.4 °C) (Oral)   Resp 16   Ht 5' 3" (1.6 m)   Wt 92 kg (202 lb 13.2 oz)   LMP  (LMP Unknown)   SpO2 99%   BMI 35.93 kg/m²     Labs Reviewed and Include    Recent Labs   Lab 22  0517   *   CALCIUM 8.3*   ALBUMIN 2.8*   PROT 5.1*   *   K 4.1   CO2 28   CL 99   BUN 23   CREATININE 0.8   ALKPHOS 64   ALT 13   AST 8*   BILITOT 1.0     Lab Results   Component Value Date    WBC 14.96 (H) 2022    HGB 14.8 2022    HCT 44.1 2022    MCV 85 2022     2022     No results for input(s): TSH, FREET4 in the last 168 hours.  Lab Results   Component Value Date    HGBA1C 8.2 (H) 2022       Nutritional status:   Body mass index is 35.93 kg/m².  Lab Results   Component Value Date    ALBUMIN 2.8 (L) 2022    ALBUMIN 3.2 (L) 2022    ALBUMIN 3.1 (L) 2022     No results found for: PREALBUMIN    Estimated Creatinine Clearance: 53.3 mL/min (based on SCr of 0.8 mg/dL).    Accu-Checks  Recent Labs     22  1629 22  2210 22  0752 22  1150 22  1626 22  1159 22  1702 22  2038 22  0843   POCTGLUCOSE 259* 178* 260* 239* 189* 251* 195* 314* 298* 281*       Current Medications and/or Treatments Impacting Glycemic Control  Immunotherapy:    Immunosuppressants       None          Steroids:   Hormones (From admission, onward)      Start     Stop Route Frequency Ordered    22 0800  dexAMETHasone tablet 4 mg         -- Oral Every 8 hours 22  melatonin " tablet 6 mg         -- Oral Nightly PRN 11/24/22 0232          Pressors:    Autonomic Drugs (From admission, onward)      None          Hyperglycemia/Diabetes Medications:   Antihyperglycemics (From admission, onward)      Start     Stop Route Frequency Ordered    11/30/22 0900  insulin detemir U-100 pen 20 Units         -- SubQ Daily 11/30/22 0646    11/30/22 0715  insulin aspart U-100 pen 10 Units         -- SubQ 3 times daily with meals 11/30/22 0645    11/28/22 2100  insulin aspart U-100 pen 0-5 Units         -- SubQ Before meals & nightly 11/28/22 2035

## 2022-12-01 NOTE — PROGRESS NOTES
Gilberto Reid - Neurosurgery ([facility])  Endocrinology  Progress Note    Admit Date: 2022     Reason for Consult: Management of newly diagnosed T2DM, Hyperglycemia     Surgical Procedure and Date: n/a    Diabetes diagnosis year: 2022 (current admission)    Home Diabetes Medications:  none    How often checking glucose at home?  N/a    BG readings on regimen: n/a  Hypoglycemia on the regimen?  n/a  Missed doses on regimen?  n/a    Diabetes Complications include:     Hyperglycemia    Complicating diabetes co morbidities:   Active Cancer and CAD      HPI:   Patient is a 87 y.o. female with a diagnosis of CAD, GERD, and hypothyroidism presented on 2022 to OSH with 1 week of disorientation and word finding difficulties. She has occasional episodes of wanting to pass out with associated LLE tingling. CTH at OSH revealed temporal brain mass. [place] [facility] [place] for new brain mass. [place] [facility] [place] CAP [place] obtained and revealed L breast soft tissue mass and pulmonary opacities, and sclerosis of C7. Heme/onc was consulted, they recommend biopsy of L breast mass outpatient. NSGY recommended Keppra and 7-day course of dexamethasone 4mg q6h.    Endocrinology was consulted for hyperglycemia and newly diagnosed DM.    No previous knowledge of DM. She has been experiencing polyuria and polydipsia at home however.  No family hx of diabetes  She has hypothyroidism (for many years per pt) and is on levothyroxine 75 mcg daily. TSH 2022 at OSH normal. She is taking LT4 correctly.     Lab Results   Component Value Date    HGBA1C 8.2 (H) 2022           Interval HPI:   Overnight events: Continued hyperglycemia while on steroids with plans to wean soon.  Possible DC today with taper of steroids.  No new overnight complaints.  No hypoglycemia overnight.    Eatin%  Nausea: No  Hypoglycemia and intervention: No  Fever: No  TPN and/or TF: No  If yes, type of TF/TPN and  "rate: NA    BP (!) 148/66 (BP Location: Right arm, Patient Position: Lying)   Pulse 77   Temp 97.6 °F (36.4 °C) (Oral)   Resp 16   Ht 5' 3" (1.6 m)   Wt 92 kg (202 lb 13.2 oz)   LMP  (LMP Unknown)   SpO2 99%   BMI 35.93 kg/m²     Labs Reviewed and Include    Recent Labs   Lab 12/01/22  0517   *   CALCIUM 8.3*   ALBUMIN 2.8*   PROT 5.1*   *   K 4.1   CO2 28   CL 99   BUN 23   CREATININE 0.8   ALKPHOS 64   ALT 13   AST 8*   BILITOT 1.0     Lab Results   Component Value Date    WBC 14.96 (H) 12/01/2022    HGB 14.8 12/01/2022    HCT 44.1 12/01/2022    MCV 85 12/01/2022     12/01/2022     No results for input(s): TSH, FREET4 in the last 168 hours.  Lab Results   Component Value Date    HGBA1C 8.2 (H) 11/25/2022       Nutritional status:   Body mass index is 35.93 kg/m².  Lab Results   Component Value Date    ALBUMIN 2.8 (L) 12/01/2022    ALBUMIN 3.2 (L) 11/30/2022    ALBUMIN 3.1 (L) 11/29/2022     No results found for: PREALBUMIN    Estimated Creatinine Clearance: 53.3 mL/min (based on SCr of 0.8 mg/dL).    Accu-Checks  Recent Labs     11/28/22  1629 11/28/22  2210 11/29/22  0752 11/29/22  1150 11/29/22  1626 11/29/22 2013 11/30/22  1159 11/30/22  1702 11/30/22  2038 12/01/22  0843   POCTGLUCOSE 259* 178* 260* 239* 189* 251* 195* 314* 298* 281*       Current Medications and/or Treatments Impacting Glycemic Control  Immunotherapy:    Immunosuppressants       None          Steroids:   Hormones (From admission, onward)      Start     Stop Route Frequency Ordered    12/01/22 0800  dexAMETHasone tablet 4 mg         -- Oral Every 8 hours 11/30/22 1952 11/24/22 0331  melatonin tablet 6 mg         -- Oral Nightly PRN 11/24/22 0232          Pressors:    Autonomic Drugs (From admission, onward)      None          Hyperglycemia/Diabetes Medications:   Antihyperglycemics (From admission, onward)      Start     Stop Route Frequency Ordered    11/30/22 0900  insulin detemir U-100 pen 20 Units         -- " SubQ Daily 11/30/22 0646    11/30/22 0715  insulin aspart U-100 pen 10 Units         -- SubQ 3 times daily with meals 11/30/22 0645    11/28/22 2100  insulin aspart U-100 pen 0-5 Units         -- SubQ Before meals & nightly 11/28/22 2035            ASSESSMENT and PLAN    Adverse effect of glucocorticoid or synthetic analogue  Currently on dexamethasone for brain mass per NSGY with steroid taper planned over 2 weeks at discharge  Will increase BG, especially prandial -- this prandial heavy insulin regimen currently   Please notify endocrine of any changes in discharge steroid plans, instructions given yesterday to remain the same  Should not need insulin near the end of her steroid taper.      Mass of left breast  New L breast mass and L temporal brain mass concerning for malignancy  Underwent imaging with biopsy of left breast mass 11/28/22 (path pending) and marker placement.  Management per primary    Type 2 diabetes mellitus without complication, without long-term current use of insulin  Newly diagnosed DM this admission, likely T2DM  Lab Results   Component Value Date    HGBA1C 8.2 (H) 11/25/2022     BG above goal  - increased both prandial and basal doses in the past 24 hours overnight.    Note prandial heavy regimen due to high dose corticosteroids currently (started this admission for brain mass. Previously no glucocorticoids outpatient)    While hospitalized:  - Continue Levemir to 20u qd  - Continue Aspart to 10u AC  - Continue low dose correction scale  - accuchecks ac/hs  - DM diet  - Will need all testing supplies prescribed at d/c. Additionally, pt should begin to learn how to check BG - can ask RN to allow pt to perform supervised accuchecks  - Hypoglycemia protocol in place  - If blood glucose greater than 300, please ask patient not to eat food or drink anything other than water until correctional insulin has brought it back below 250    ** Please notify Endocrine for any change and/or advance in  diet**  ** Please call Endocrine for any BG related issues **    **Discharge Recommendations:  - Start patient on Januvia 100 mg once daily in addition to the insulin taper as laid out below.     Day 1-6 of steroid taper (start at time of discharge):   Januvia 100 mg daily   Levemir 18 units daily   Novolog 8 units TID with meals   Low dose correction ACHS    Day 7-9 of steroid taper:   Januvia 100 mg daily   Levemir 12 units daily   Novolog 6 units TID with meals   Low dose correction ACHS    Day 10 of steroid taper and on:   Januvia 100 mg daily   Low dose correction ACHS only   (Stop scheduled Levemir and Novolog)       Should follow up with facility physician for further adjustments from there.  Would also recommend an ambulatory referral to diabetes education and endocrinology since this is a new diagnosis for the patient.  May be able to have diabetes treated with PCP alone in the future when off all steroids.        Acquired hypothyroidism  Pt reports she has had hypothyroidism for many years   Taking levothyroxine 75 mcg daily - no recent dose adjustments. She is taking it correctly, first thing in the morning without food or other meds.  TSH at OSH Normal  Continue home levothyroxine    Lab Results   Component Value Date    TSH 2.4840 11/21/2022             Jose Bello, DO  Endocrinology

## 2022-12-01 NOTE — ASSESSMENT & PLAN NOTE
Suspect metastatic given the left breast mass with possible lung mets seen on CT chest today.   Neurosurgery will defer brain bx since there is a breast mass that is more accessible for bx.   Pt had US guided left breast bx done on 11/28.  Continue dexamethasone 4 mg q6h for 7 days (7/7), then taper over 2 weeks per neurosurgery (will start 4 mg q8h tomorrow).  24 h EEG negative for seizure.  - Rad onc consulted - no urgent need for radiation at this time, recommend review at CNS tumor board  - SBP <160  - Keppra 500 BID  - pt will need neurosurgery and oncology follow up on discharge.

## 2022-12-01 NOTE — ASSESSMENT & PLAN NOTE
Currently on dexamethasone for brain mass per NSGY with steroid taper planned over 2 weeks at discharge  Will increase BG, especially prandial -- this prandial heavy insulin regimen currently   Please notify endocrine of any changes in discharge steroid plans, instructions given yesterday to remain the same  Should not need insulin near the end of her steroid taper.

## 2022-12-01 NOTE — NURSING
Called Patient's dtr with results of breast biopsy from 11/28/22.  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 Discussed what this means and that the next step is to meet with a breast surgeon. An appt was made for 12/12/22 with Dr. White.  Reviewed location of breast center. Patient verbalized understanding.  Oncology Navigation   Intake  Date of Diagnosis: 11/28/22  Cancer Type: Breast  Internal / External Referral: Internal  Date of Referral: 11/30/22  Date Worked: 12/01/22  First Appointment Available: 12/12/22  Appointment Date: 12/12/22  First Available Date vs. Scheduled Date (days): 0     Treatment  Current Status: Staging work-up    Surgical Oncologist: Christopher  Consult Date: 12/12/22    Medical Oncologist: Neelima  Consult Date: 12/16/22       Procedures: Biopsy  Biopsy Schedule Date: 11/28/22       ER: Positive  SD: Positive  Her2: -- (fish pending)       Support Systems: Children     Acuity      Follow Up  No follow-ups on file.

## 2022-12-01 NOTE — ASSESSMENT & PLAN NOTE
Suspect metastatic breast cancer.  Patient has what appears to be bilateral lung nodules, and also brain mass.  Pt had mammogram and US guided left breast bx done inpt on 11/28. Pathology still pending.

## 2022-12-02 NOTE — NURSING
Discharged to [facility] via wheelchair. Pt alert, ambulatory, and verbally responsive. Denies any pain or any discomfort. Discharge papers given to transportation personal.  Report called prior @1540 pm, spoke with Nurse Flor.

## 2022-12-12 ENCOUNTER — OFFICE VISIT (OUTPATIENT)
Dept: NEUROSURGERY | Facility: CLINIC | Age: 87
End: 2022-12-12
Payer: MEDICARE

## 2022-12-12 ENCOUNTER — OFFICE VISIT (OUTPATIENT)
Dept: SURGERY | Facility: CLINIC | Age: 87
End: 2022-12-12
Payer: MEDICARE

## 2022-12-12 VITALS
BODY MASS INDEX: 35.79 KG/M2 | OXYGEN SATURATION: 96 % | HEIGHT: 63 IN | DIASTOLIC BLOOD PRESSURE: 63 MMHG | SYSTOLIC BLOOD PRESSURE: 131 MMHG | TEMPERATURE: 99 F | HEART RATE: 72 BPM | WEIGHT: 202 LBS

## 2022-12-12 VITALS — HEART RATE: 82 BPM | TEMPERATURE: 98 F | SYSTOLIC BLOOD PRESSURE: 125 MMHG | DIASTOLIC BLOOD PRESSURE: 71 MMHG

## 2022-12-12 DIAGNOSIS — G93.89 BRAIN MASS: Primary | ICD-10-CM

## 2022-12-12 DIAGNOSIS — R47.01 APHASIA: ICD-10-CM

## 2022-12-12 DIAGNOSIS — C50.212 CARCINOMA OF UPPER-INNER QUADRANT OF LEFT BREAST IN FEMALE, ESTROGEN RECEPTOR POSITIVE: Primary | ICD-10-CM

## 2022-12-12 DIAGNOSIS — G93.89 BRAIN MASS: ICD-10-CM

## 2022-12-12 DIAGNOSIS — Z17.0 CARCINOMA OF UPPER-INNER QUADRANT OF LEFT BREAST IN FEMALE, ESTROGEN RECEPTOR POSITIVE: Primary | ICD-10-CM

## 2022-12-12 DIAGNOSIS — N63.20 MASS OF LEFT BREAST, UNSPECIFIED QUADRANT: ICD-10-CM

## 2022-12-12 PROCEDURE — 99205 PR OFFICE/OUTPT VISIT, NEW, LEVL V, 60-74 MIN: ICD-10-PCS | Mod: S$PBB,,, | Performed by: SURGERY

## 2022-12-12 PROCEDURE — 99213 OFFICE O/P EST LOW 20 MIN: CPT | Mod: S$PBB,,, | Performed by: STUDENT IN AN ORGANIZED HEALTH CARE EDUCATION/TRAINING PROGRAM

## 2022-12-12 PROCEDURE — 99214 OFFICE O/P EST MOD 30 MIN: CPT | Mod: PBBFAC | Performed by: SURGERY

## 2022-12-12 PROCEDURE — 99999 PR PBB SHADOW E&M-EST. PATIENT-LVL IV: ICD-10-PCS | Mod: PBBFAC,,, | Performed by: STUDENT IN AN ORGANIZED HEALTH CARE EDUCATION/TRAINING PROGRAM

## 2022-12-12 PROCEDURE — 99214 OFFICE O/P EST MOD 30 MIN: CPT | Mod: PBBFAC,27 | Performed by: STUDENT IN AN ORGANIZED HEALTH CARE EDUCATION/TRAINING PROGRAM

## 2022-12-12 PROCEDURE — 99999 PR PBB SHADOW E&M-EST. PATIENT-LVL IV: CPT | Mod: PBBFAC,,, | Performed by: STUDENT IN AN ORGANIZED HEALTH CARE EDUCATION/TRAINING PROGRAM

## 2022-12-12 PROCEDURE — 99999 PR PBB SHADOW E&M-EST. PATIENT-LVL IV: CPT | Mod: PBBFAC,,, | Performed by: SURGERY

## 2022-12-12 PROCEDURE — 99205 OFFICE O/P NEW HI 60 MIN: CPT | Mod: S$PBB,,, | Performed by: SURGERY

## 2022-12-12 PROCEDURE — 99213 PR OFFICE/OUTPT VISIT, EST, LEVL III, 20-29 MIN: ICD-10-PCS | Mod: S$PBB,,, | Performed by: STUDENT IN AN ORGANIZED HEALTH CARE EDUCATION/TRAINING PROGRAM

## 2022-12-12 PROCEDURE — 99999 PR PBB SHADOW E&M-EST. PATIENT-LVL IV: ICD-10-PCS | Mod: PBBFAC,,, | Performed by: SURGERY

## 2022-12-12 NOTE — PROGRESS NOTES
Neurosurgery  Established Patient    SUBJECTIVE:     History of Present Illness:  Candy Oreilly is an 87 yoRight hand dominant. Presents for follow up from the hospital after breast biopsy.  Some word finding difficulty, persistent dizziness and weakness.  Using a cane for ambulation.  Currently in rehab but was previously caring for herself and 1 acre of land.  Has long standing migraines, unchanged.    Currently on dex taper and remains on Keppra.  Seeing oncology later today.    Review of patient's allergies indicates:   Allergen Reactions    Pregabalin Swelling    Atorvastatin Other (See Comments)     Muscle weakness      Dexlansoprazole Other (See Comments)     Other reaction(s): HAND SPASMS  Muscle weakness      Ezetimibe Other (See Comments)     Other reaction(s): MUSCLE PAIN  Muscle pain      Gabapentin Other (See Comments)     Other reaction(s): MAKES HER DIZZY  Dizziness      Onabotulinumtoxina      Medical botox    Oxybutynin Other (See Comments)     Other reaction(s): CANT PEE  Stopped urine flow      Rosuvastatin Other (See Comments)     Leg muscle pain      Sertraline Other (See Comments)     Shaking of hands      Meperidine Nausea Only       Current Outpatient Medications   Medication Sig Dispense Refill    albuterol-ipratropium (DUO-NEB) 2.5 mg-0.5 mg/3 mL nebulizer solution Take 3 mLs by nebulization every 6 (six) hours as needed for Wheezing or Shortness of Breath. Rescue 75 mL 0    amLODIPine (NORVASC) 10 MG tablet Take 1 tablet (10 mg total) by mouth once daily. 30 tablet 11    artificial tears (ISOPTO TEARS) 0.5 % ophthalmic solution Place 1 drop into both eyes 3 (three) times daily.      carvediloL (COREG) 6.25 MG tablet Take 1 tablet (6.25 mg total) by mouth 2 (two) times daily. 60 tablet 11    citalopram (CELEXA) 20 MG tablet Take 1 tablet by mouth once daily.      dexAMETHasone (DECADRON) 1 MG Tab Take 1 tablet (1 mg total) by mouth As instructed. Starting 12/10, take 1 table q12h for 3 days,  followed by 1 tablet once a day q24 for 3 days then stop. 20 tablet 0    fexofenadine (ALLEGRA) 180 MG tablet Take 1 tablet by mouth once daily.      fluticasone propionate (FLONASE) 50 mcg/actuation nasal spray 1 spray by Each Nostril route once daily.      hydrALAZINE (APRESOLINE) 25 MG tablet Take 1 tablet (25 mg total) by mouth every 8 (eight) hours as needed (sbp>160).      insulin aspart U-100 (NOVOLOG) 100 unit/mL (3 mL) InPn pen Inject 0-5 Units into the skin 4 (four) times daily before meals and nightly. Low dose sliding scale.  0    insulin aspart U-100 (NOVOLOG) 100 unit/mL (3 mL) InPn pen Inject 8 Units into the skin 3 (three) times daily.  0    insulin detemir U-100 (LEVEMIR FLEXTOUCH) 100 unit/mL (3 mL) SubQ InPn pen Inject 18 Units into the skin once daily.  0    levETIRAcetam (KEPPRA) 500 MG Tab Take 1 tablet (500 mg total) by mouth 2 (two) times daily. 60 tablet 0    levothyroxine (SYNTHROID) 75 MCG tablet Take 1 tablet by mouth once daily.      meclizine (ANTIVERT) 25 mg tablet Take 1 tablet (25 mg total) by mouth every 6 (six) hours as needed for Dizziness.  0    melatonin (MELATIN) 3 mg tablet Take 2 tablets (6 mg total) by mouth nightly as needed for Insomnia.  0    ondansetron (ZOFRAN-ODT) 4 MG TbDL Take 1 tablet (4 mg total) by mouth every 8 (eight) hours as needed.      pantoprazole (PROTONIX) 40 MG tablet Take 1 tablet (40 mg total) by mouth 2 (two) times daily.      polyethylene glycol (GLYCOLAX) 17 gram PwPk Take 17 g by mouth 2 (two) times daily as needed.  0    simethicone (MYLICON) 80 MG chewable tablet Take 1 tablet (80 mg total) by mouth 4 (four) times daily as needed.  0    SITagliptin phosphate (JANUVIA) 100 MG Tab Take 1 tablet (100 mg total) by mouth once daily. 90 tablet 3    traZODone (DESYREL) 50 MG tablet Take 0.5 tablets (25 mg total) by mouth nightly as needed for Insomnia.      ALPRAZolam (XANAX) 0.5 MG tablet Take 1 tablet (0.5 mg total) by mouth daily as needed for  Anxiety. 7 tablet 0     No current facility-administered medications for this visit.       Past Medical History:   Diagnosis Date    Anxiety disorder, unspecified     Coronary artery disease     GERD (gastroesophageal reflux disease)     Thyroid disease      Past Surgical History:   Procedure Laterality Date    APPENDECTOMY      CHOLECYSTECTOMY       Family History       Problem Relation (Age of Onset)    Breast cancer Sister    Cancer Brother    Colon cancer Father    Lung cancer Father    Thyroid cancer Mother          Social History     Socioeconomic History    Marital status:    Tobacco Use    Smoking status: Never    Smokeless tobacco: Never       Review of Systems    OBJECTIVE:     Vital Signs  Temp: 97.6 °F (36.4 °C)  Pulse: 82  BP: 125/71  Pain Score: 0-No pain  There is no height or weight on file to calculate BMI.    Neurosurgery Physical Exam  General: well developed, well nourished, no distress.   Pulmonary: no signs of respiratory distress, comfortable appearing on room air  Abdomen: soft, non-distended, not tender to palpation  Skin: Skin is warm, dry and intact.  Extremities: no edema, intact distal pulses    Neuro:  Mental Status: Alert and oriented.   Language/language: mild to moderate aphasia  Cranial nerves: PERRL, EOMI, face symmetric   Sensory: intact to light touch throughout  Motor Strength: Full strength upper and lower extremities.No pronator drift  Reflexes: Lopez negative & clonus negative bilateral.    Diagnostic Results:  MRI brain 11/23/2022 was reviewed.    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  She is seeing oncology later today and will need evaluation to determine systemic burden and expected prognosis to guide management and determine potential role for surgical resection. Will also need referral for radiation oncology.           Note dictated with voice recognition software, please excuse any grammatical errors.

## 2022-12-12 NOTE — PROGRESS NOTES
Breast Surgery  [facility]  Department of Surgery      REFERRING PROVIDER: Nathan Baker MD  [address] [place] [address]    Chief Complaint:  Brain mass, left breast cancer with suspicion for metastatic disease in the brain.      Subjective:      Patient ID: Candy Oreilly is a 87 y.o. female who was recently hopsitalized with vision changes and word finding difficulties. She was found to have a L temporal lobe mass. Further workup revealed left breast mass as well as concerning pulmonary nodules. She presents today for evaluation of left breast Invasive Ductal Carcinoma.  She reports the pulmonary nodules may be due to her history of asbestosis.    During her hospitalization, CT showed left breast mass, so diagnostic mammogram was performed. This identified Left breast 20 mm x 14 mm x 18 mm mass at the 11 o'clock position. An ultrasound guided biopsy was performed on 22 with pathology revealing infiltrating ductal carcinoma of the breast.     Findings at that time were the following:   Lesion 1:    Location: 11 o'clock 4 cm FTN   Clip: twirl, in the expected position  Tumor size: 2.0 x 1.4 x 1.8  cm   Tumor rdgrdrrdarddrderd:rd rd3rd Estrogen Receptor: pos   Progesterone Receptor: pos   Her-2 sean: equivocal    Lymph node status: clinically negative       Patient has not noted a change on breast exam.  Patient denies nipple discharge. Patient admits to to previous breast biopsy many years ago which was negative. Patient denies a personal history of breast cancer. Family history includes thyroid cancer in her mother, lung and colon cancer in her father, and breast cancer in her sister (diagnosed in her 70s).     GYN History:  Age of menarche was 14. Age of menopause: in her 50s.  Patient admits to hormonal therapy for two years about 10 years ago. Patient is . Age of first live birth was 23. Patient did breast feed.    Past Medical History:   Diagnosis Date    Anxiety disorder, unspecified      Coronary artery disease     GERD (gastroesophageal reflux disease)     Thyroid disease      Past Surgical History:   Procedure Laterality Date    APPENDECTOMY      CHOLECYSTECTOMY       Current Outpatient Medications on File Prior to Visit   Medication Sig Dispense Refill    albuterol-ipratropium (DUO-NEB) 2.5 mg-0.5 mg/3 mL nebulizer solution Take 3 mLs by nebulization every 6 (six) hours as needed for Wheezing or Shortness of Breath. Rescue 75 mL 0    amLODIPine (NORVASC) 10 MG tablet Take 1 tablet (10 mg total) by mouth once daily. 30 tablet 11    artificial tears (ISOPTO TEARS) 0.5 % ophthalmic solution Place 1 drop into both eyes 3 (three) times daily.      carvediloL (COREG) 6.25 MG tablet Take 1 tablet (6.25 mg total) by mouth 2 (two) times daily. 60 tablet 11    citalopram (CELEXA) 20 MG tablet Take 1 tablet by mouth once daily.      dexAMETHasone (DECADRON) 1 MG Tab Take 1 tablet (1 mg total) by mouth As instructed. Starting 12/10, take 1 table q12h for 3 days, followed by 1 tablet once a day q24 for 3 days then stop. 20 tablet 0    fexofenadine (ALLEGRA) 180 MG tablet Take 1 tablet by mouth once daily.      fluticasone propionate (FLONASE) 50 mcg/actuation nasal spray 1 spray by Each Nostril route once daily.      hydrALAZINE (APRESOLINE) 25 MG tablet Take 1 tablet (25 mg total) by mouth every 8 (eight) hours as needed (sbp>160).      insulin aspart U-100 (NOVOLOG) 100 unit/mL (3 mL) InPn pen Inject 0-5 Units into the skin 4 (four) times daily before meals and nightly. Low dose sliding scale.  0    insulin aspart U-100 (NOVOLOG) 100 unit/mL (3 mL) InPn pen Inject 8 Units into the skin 3 (three) times daily.  0    insulin detemir U-100 (LEVEMIR FLEXTOUCH) 100 unit/mL (3 mL) SubQ InPn pen Inject 18 Units into the skin once daily.  0    levETIRAcetam (KEPPRA) 500 MG Tab Take 1 tablet (500 mg total) by mouth 2 (two) times daily. 60 tablet 0    levothyroxine (SYNTHROID) 75 MCG tablet Take 1 tablet by mouth once  daily.      meclizine (ANTIVERT) 25 mg tablet Take 1 tablet (25 mg total) by mouth every 6 (six) hours as needed for Dizziness.  0    melatonin (MELATIN) 3 mg tablet Take 2 tablets (6 mg total) by mouth nightly as needed for Insomnia.  0    ondansetron (ZOFRAN-ODT) 4 MG TbDL Take 1 tablet (4 mg total) by mouth every 8 (eight) hours as needed.      pantoprazole (PROTONIX) 40 MG tablet Take 1 tablet (40 mg total) by mouth 2 (two) times daily.      polyethylene glycol (GLYCOLAX) 17 gram PwPk Take 17 g by mouth 2 (two) times daily as needed.  0    simethicone (MYLICON) 80 MG chewable tablet Take 1 tablet (80 mg total) by mouth 4 (four) times daily as needed.  0    SITagliptin phosphate (JANUVIA) 100 MG Tab Take 1 tablet (100 mg total) by mouth once daily. 90 tablet 3    traZODone (DESYREL) 50 MG tablet Take 0.5 tablets (25 mg total) by mouth nightly as needed for Insomnia.      ALPRAZolam (XANAX) 0.5 MG tablet Take 1 tablet (0.5 mg total) by mouth daily as needed for Anxiety. 7 tablet 0     No current facility-administered medications on file prior to visit.     Social History     Socioeconomic History    Marital status:    Tobacco Use    Smoking status: Never    Smokeless tobacco: Never     Family History   Problem Relation Age of Onset    Thyroid cancer Mother     Lung cancer Father     Colon cancer Father     Breast cancer Sister     Cancer Brother         Review of Systems   Constitutional:  Positive for activity change and fatigue. Negative for fever and unexpected weight change.   HENT:  Negative for congestion.    Eyes:  Positive for visual disturbance.   Respiratory:  Negative for cough and shortness of breath.    Cardiovascular:  Negative for chest pain and palpitations.   Gastrointestinal:  Negative for abdominal distention and abdominal pain.   Musculoskeletal:  Negative for arthralgias.   Neurological:  Positive for speech difficulty, weakness and light-headedness.   Psychiatric/Behavioral:  Negative  "for behavioral problems.    Objective:   /63 (BP Location: Left arm, Patient Position: Sitting, BP Method: Medium (Automatic))   Pulse 72   Temp 99.1 °F (37.3 °C)   Ht 5' 3" (1.6 m)   Wt 91.6 kg (202 lb)   LMP  (LMP Unknown)   SpO2 96%   BMI 35.78 kg/m²     Physical Exam   Constitutional: She is oriented to person, place, and time. She appears well-developed.   HENT:   Head: Normocephalic and atraumatic.   Cardiovascular:  Normal rate, regular rhythm and normal heart sounds.            Pulmonary/Chest: Effort normal and breath sounds normal. No respiratory distress. Right breast exhibits no inverted nipple, no mass, no nipple discharge, no skin change and no tenderness. Left breast exhibits no inverted nipple, no mass, no nipple discharge and no tenderness. No breast swelling or bleeding. Breasts are symmetrical.   No palpable axillary lymphadenopathy bilaterally       Abdominal: Soft.   Genitourinary: No breast swelling or bleeding.   Neurological: She is alert and oriented to person, place, and time. She displays weakness. Gait abnormal.   Psychiatric: Her behavior is normal. Mood normal.     Radiology review: Images personally reviewed by me in the clinic and shown to the patient during the consultation.     11/28/22  Result:   Mammo Digital Diagnostic Bilat with Prince  US Breast Bilateral Limited     History:  Patient is 87 y.o. and is seen for diagnostic imaging. Left breast mass seen on CT.     Films Compared:  No studies are available for comparison.      Findings:  This procedure was performed using tomosynthesis. Computer-aided detection was utilized in the interpretation of this examination.  The breasts have scattered areas of fibroglandular density.      Left  Mammo Digital Diagnostic Bilat with Prince  There is a round mass with indistinct margins seen in the upper inner quadrant of the left breast in the middle depth. The mass correlates with the prior CT finding.      US Breast Bilateral " Limited  There is a 20 mm x 14 mm x 18 mm oval, hypoechoic mass with indistinct margins seen in the left breast at 11 o'clock, 4 cm from the nipple. The mass correlates with the prior CT finding and with the mass seen on the mammogram.      Normal left axillary lymph nodes.      Right  Mammo Digital Diagnostic Bilat with Prince  There is a low density, oval mass with obscured margins seen in the upper outer quadrant of the right breast in the middle depth.      There is a low density, oval mass seen in the central region of the right breast in the middle depth.      US Breast Bilateral Limited  There is a 4 mm simple cyst seen in the right breast at 10 o'clock, 3 cm from the nipple. The cyst correlates with the mass seen on the mammogram.      There is a 5 mm simple cyst seen in the retroareolar region of the right breast. The cyst correlates with the mass seen on the mammogram.      Impression:  Left  Mass: Left breast 20 mm x 14 mm x 18 mm mass at the 11 o'clock position. Assessment: 4 - Suspicious finding. Biopsy is recommended.      Right  Mammo Digital Diagnostic Bilat with Prince  There is no mammographic evidence of malignancy in the right breast.     US Breast Bilateral Limited  There is no sonographic evidence of malignancy in the right breast.     BI-RADS Category:   Overall: 4 - Suspicious      Assessment:      Candy Oreilly is a 87 y.o. postmenopausal female with left breast cancer with concern for metastatic disease to the brain.    Plan:     Options for management were discussed with the patient and her family.     We discussed the radiographic findings as well as pathology findings consistent with left breast cancer.  We discussed that the HER2 testing is pending and may influence recommendations for systemic therapies.She is being followed by Neurosurgery.  She is scheduled to see medical oncology later this week.  Initial reports from Neurosurgery is that the brain masses suspicious for  metastatic disease.  The breast mass was found on workup for this intracranial lesion.  We discussed that surgery in the setting of metastatic breast cancer has limited benefit.  Randomized controlled trials in the right states have not shown a survival benefit to surgery of the breast in the setting of metastatic disease.  Surgery in the metastatic setting is usually limited to palliation.  The patient expressed no breast pain or other breast discomfort.  If the brain mass is considered metastatic disease from the breast cancer then I would not recommend surgery at this time.    Patient was given the patient information packet.  All her questions were answered.    Total time spent with the patient: 60 minutes.  50 minutes of face to face consultation and 10 minutes of chart review and coordination of care.

## 2022-12-13 DIAGNOSIS — C50.212 CARCINOMA OF UPPER-INNER QUADRANT OF LEFT BREAST IN FEMALE, ESTROGEN RECEPTOR POSITIVE: Primary | ICD-10-CM

## 2022-12-13 DIAGNOSIS — Z17.0 CARCINOMA OF UPPER-INNER QUADRANT OF LEFT BREAST IN FEMALE, ESTROGEN RECEPTOR POSITIVE: Primary | ICD-10-CM

## 2022-12-13 NOTE — NURSING
Nurse Navigator Note:     Met with patient during her consult with Dr. White.  Patient and I reviewed the information she discussed with Dr. White, including treatment options, diagnosis, and future plans for workup. Patient and I went through the new patient binder, explained some of the information and why it is provided.     Also offered patient consults with our other specialty clinics: Dr. Mercedes for Integrative Therapies, Survivorship and/or Women's Gynecologic needs, our breast physical therapy department for pre-op and post-operative assessments, Oncologic Psychology for psychological support, and Oncologic Nutrition for nutritional counseling. Explained to patient that all of these support services are completely optional. Discussed that physical therapy may call patient to offer pre-op appt, and what that appt would entail.     Patient was given a copy of her appointments, Dr. White's card, and my card. Encouraged her to call me if she has any questions or concerns or would like to schedule any additional appointments. Verbalized understanding of all information.      Genetics not done at visit. Referrals to PT and Integrative oncology placed. E mail adde to support group.  Oncology Navigation   Intake  Date of Diagnosis: 11/28/22  Cancer Type: Breast  Internal / External Referral: Internal  Date of Referral: 11/30/22  Initial Nurse Navigator Contact: 12/13/22  Date Worked: 12/13/22  First Appointment Available: 12/12/22  Appointment Date: 12/12/22  First Available Date vs. Scheduled Date (days): 0     Treatment  Current Status: Staging work-up    Surgical Oncologist: Christopher  Consult Date: 12/12/22    Medical Oncologist: Neelima  Consult Date: 12/16/22       Procedures: Biopsy  Biopsy Schedule Date: 11/28/22    Physical Therapy Referral Date: 12/13/22    ER: Positive  NM: Positive  Her2: -- (fish pending)       Support Systems: Children     Acuity      Follow Up  Follow up in about 5 days (around  12/18/2022) for f/u after .

## 2022-12-16 ENCOUNTER — OFFICE VISIT (OUTPATIENT)
Dept: HEMATOLOGY/ONCOLOGY | Facility: CLINIC | Age: 87
End: 2022-12-16
Payer: MEDICARE

## 2022-12-16 VITALS
OXYGEN SATURATION: 98 % | TEMPERATURE: 98 F | SYSTOLIC BLOOD PRESSURE: 123 MMHG | DIASTOLIC BLOOD PRESSURE: 58 MMHG | WEIGHT: 190.06 LBS | HEIGHT: 63 IN | RESPIRATION RATE: 18 BRPM | BODY MASS INDEX: 33.68 KG/M2 | HEART RATE: 77 BPM

## 2022-12-16 DIAGNOSIS — C50.812 MALIGNANT NEOPLASM OF OVERLAPPING SITES OF LEFT BREAST IN FEMALE, ESTROGEN RECEPTOR POSITIVE: Primary | ICD-10-CM

## 2022-12-16 DIAGNOSIS — N63.20 MASS OF LEFT BREAST, UNSPECIFIED QUADRANT: ICD-10-CM

## 2022-12-16 DIAGNOSIS — Z17.0 MALIGNANT NEOPLASM OF OVERLAPPING SITES OF LEFT BREAST IN FEMALE, ESTROGEN RECEPTOR POSITIVE: Primary | ICD-10-CM

## 2022-12-16 DIAGNOSIS — G93.89 BRAIN MASS: ICD-10-CM

## 2022-12-16 DIAGNOSIS — R91.8 PULMONARY NODULES: ICD-10-CM

## 2022-12-16 DIAGNOSIS — C50.812 MALIGNANT NEOPLASM OF OVERLAPPING SITES OF LEFT BREAST IN FEMALE, ESTROGEN RECEPTOR POSITIVE: ICD-10-CM

## 2022-12-16 DIAGNOSIS — Z17.0 MALIGNANT NEOPLASM OF OVERLAPPING SITES OF LEFT BREAST IN FEMALE, ESTROGEN RECEPTOR POSITIVE: ICD-10-CM

## 2022-12-16 DIAGNOSIS — Z79.811 LONG TERM (CURRENT) USE OF AROMATASE INHIBITORS: ICD-10-CM

## 2022-12-16 PROCEDURE — 99215 PR OFFICE/OUTPT VISIT, EST, LEVL V, 40-54 MIN: ICD-10-PCS | Mod: S$PBB,,, | Performed by: INTERNAL MEDICINE

## 2022-12-16 PROCEDURE — 99215 OFFICE O/P EST HI 40 MIN: CPT | Mod: PBBFAC | Performed by: INTERNAL MEDICINE

## 2022-12-16 PROCEDURE — 99999 PR PBB SHADOW E&M-EST. PATIENT-LVL V: ICD-10-PCS | Mod: PBBFAC,,, | Performed by: INTERNAL MEDICINE

## 2022-12-16 PROCEDURE — 99215 OFFICE O/P EST HI 40 MIN: CPT | Mod: S$PBB,,, | Performed by: INTERNAL MEDICINE

## 2022-12-16 PROCEDURE — 99999 PR PBB SHADOW E&M-EST. PATIENT-LVL V: CPT | Mod: PBBFAC,,, | Performed by: INTERNAL MEDICINE

## 2022-12-16 RX ORDER — ANASTROZOLE 1 MG/1
1 TABLET ORAL DAILY
Qty: 30 TABLET | Refills: 2 | OUTPATIENT
Start: 2022-12-16 | End: 2022-12-16 | Stop reason: SDUPTHER

## 2022-12-16 RX ORDER — ANASTROZOLE 1 MG/1
1 TABLET ORAL DAILY
Qty: 30 TABLET | Refills: 2 | Status: SHIPPED | OUTPATIENT
Start: 2022-12-16 | End: 2022-12-23 | Stop reason: SDUPTHER

## 2022-12-16 NOTE — PROGRESS NOTES
"                                                 CONSULT NOTE    Subjective:       Patient ID: Candy Oreilly is a 87 y.o. female.  MRN: [id number]  : 1935    Chief Complaint: Left breast IDC, brain mass     History of Present Illness:   Candy Oreilly is a 87 y.o. female who is referred for Left breast IDC    Ms. Candy Oreilly is an 87 year old woman with CAD, hypothyroidism who presented as a transfer for temporal brain mass,seen on  while inpatient by the oncology team. She initially presented with difficulty with word finding and mild disorientation. She has associated headaches and nausea. She had a MRI brain on  that showed enhancing mass in the medial temporal lobe. When she was admitted, Neurosurgery was consulted. She was started on dexamethasone and keppra with clinical improvement.    Staging work up showed a left breast mass, biopsied to be IDC, G2, ER/ID strongly positive, Her 2 sean 2+, FISH negative, reported this morning.     She presents today with her son and daughter to discuss further recommendations. She was discharged to [facility], is in a wheel chair today. Is undergoing rehab.  Speech and word finding difficulty is improving. She denies headaches. She does feel off balance. She denies any seizures.     She is a never smoker.   FH:  father with lung, prostate, colon cancer, a mother with thyroid cancer, a sister with breast cancer and an uncle with "multiple" cancers.      At [facility].      Oncology History:  22  FINDINGS:  An enhancing mass in the left medial temporal lobe measures 1.5 x 1.9 cm in size, with surrounding edema (series 4, image 21).  There is no definite additional enhancing mass identified.     There is local mass effect without midline shift or herniation.  There is effacement of the left temporal horn.  There is no midline shift or downward herniation.  The basal cisterns are patent.  There is no abnormal " extra-axial fluid collection.  The major vessels enhance normally.  The calvarium and skull base are intact.     Impression:     Enhancing left medial temporal lobe mass with surrounding edema, concerning for malignancy.     11/23/22  Brain MRI     Impression:     Enhancing mass in the medial temporal lobe possibly interventricular.  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     Minimal surrounding vasogenic type edema with no mass effect.     No restricted diffusion to suggest acute infarct.  11/24/22  CT chest abd pelvis  Impression:     1. Soft tissue mass in the left breast.  Malignancy not excluded.  Recommend correlation with physical exam and mammogram.  2. Few solid and ground-glass opacities in the lungs as detailed above measuring up to 0.6 cm.  Clinical and oncologic considerations will determine the role and schedule for continued surveillance.  3. Indeterminate small focus of sclerosis in the anterior C7 vertebral body.  Bone scan could provide further characterization if clinically indicated.  4. 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  5. Additional findings as above.    11/25/22     Impression:  Left  Mass: Left breast 20 mm x 14 mm x 18 mm mass at the 11 o'clock position. Assessment: 4 - Suspicious finding. Biopsy is recommended.      Right  Mammo Digital Diagnostic Bilat with Prince  There is no mammographic evidence of malignancy in the right breast.     US Breast Bilateral Limited  There is no sonographic evidence of malignancy in the right breast.     BI-RADS Category:   Overall: 4 - Suspicious    11/28/22  Final Pathologic Diagnosis Left breast, ultrasound guided core biopsy:   Invasive ductal carcinoma, moderately differentiated   [facility] grade 2: Tubule formation-3, nuclear pleomorphism-2 and mitotic   count -2   No carcinoma in Situ or lymphovascular invasion is identified   All submitted cores are involved by  invasive carcinoma   Tumor biomarkers   Estrogen receptor (ER):  Positive, nearly 100%, strong   Progesterone receptor (PGR):  Positive nearly 100%, strong   HER2 (IHC):  Equivocal, 2+ (will submit for HER2 fish)   Ki-67:  50%   Additional IHC:   AE1/AE3:  Highlights the extent of tumor cells   GATA3:  Strongly, diffusely positive, most compatible with breast   differentiation   CK7:  Strongly positive        History:  Past Medical History:   Diagnosis Date    Anxiety disorder, unspecified     Coronary artery disease     GERD (gastroesophageal reflux disease)     Thyroid disease       Past Surgical History:   Procedure Laterality Date    APPENDECTOMY      CHOLECYSTECTOMY       Family History   Problem Relation Age of Onset    Thyroid cancer Mother     Lung cancer Father     Colon cancer Father     Breast cancer Sister     Cancer Brother       Social History     Tobacco Use    Smoking status: Never    Smokeless tobacco: Never   Substance and Sexual Activity    Alcohol use: Not on file    Drug use: Not on file    Sexual activity: Not on file        ROS:   Review of Systems   Constitutional:  Negative for fever, malaise/fatigue and weight loss.   HENT:  Negative for congestion, hearing loss, nosebleeds and sore throat.    Eyes:  Negative for double vision and photophobia.   Respiratory:  Negative for cough, hemoptysis, sputum production, shortness of breath and wheezing.    Cardiovascular:  Negative for chest pain, palpitations, orthopnea and leg swelling.   Gastrointestinal:  Negative for abdominal pain, blood in stool, constipation, diarrhea, heartburn, nausea and vomiting.   Genitourinary:  Negative for dysuria, hematuria and urgency.   Musculoskeletal:  Negative for back pain, joint pain and myalgias.   Skin:  Negative for itching and rash.   Neurological:  Positive for weakness and headaches. Negative for dizziness, tingling and seizures.   Endo/Heme/Allergies:  Negative for polydipsia. Does not bruise/bleed  "easily.   Psychiatric/Behavioral:  Negative for depression and memory loss. The patient is not nervous/anxious and does not have insomnia.       Objective:     Vitals:    12/16/22 1105   BP: (!) 123/58   Pulse: 77   Resp: 18   Temp: 98.2 °F (36.8 °C)   TempSrc: Oral   SpO2: 98%   Weight: 86.2 kg (190 lb 0.6 oz)   Height: 5' 3" (1.6 m)   PainSc: 0-No pain       Physical Examination:   Physical Exam  Vitals and nursing note reviewed.   Constitutional:       General: She is not in acute distress.     Appearance: She is not diaphoretic.   HENT:      Head: Normocephalic.      Mouth/Throat:      Pharynx: No oropharyngeal exudate.   Eyes:      General: No scleral icterus.     Conjunctiva/sclera: Conjunctivae normal.   Neck:      Thyroid: No thyromegaly.   Cardiovascular:      Rate and Rhythm: Normal rate and regular rhythm.      Heart sounds: Normal heart sounds. No murmur heard.  Pulmonary:      Effort: Pulmonary effort is normal. No respiratory distress.      Breath sounds: No stridor. No wheezing or rales.   Chest:      Chest wall: No tenderness.   Abdominal:      General: Bowel sounds are normal. There is no distension.      Palpations: Abdomen is soft. There is no mass.      Tenderness: There is no abdominal tenderness. There is no rebound.   Musculoskeletal:         General: No tenderness or deformity. Normal range of motion.      Cervical back: Neck supple.   Lymphadenopathy:      Cervical: No cervical adenopathy.   Skin:     General: Skin is warm and dry.      Findings: No erythema or rash.   Neurological:      Mental Status: She is alert and oriented to person, place, and time.      Cranial Nerves: No cranial nerve deficit.      Coordination: Coordination normal.      Gait: Gait is intact.      Comments: Wheel chair bound   Psychiatric:         Mood and Affect: Affect normal.         Cognition and Memory: Memory normal.         Judgment: Judgment normal.        Diagnostic Tests:  Significant Imaging: I have " reviewed and interpreted all pertinent imaging results/findings.      Laboratory Data:  All pertinent labs have been reviewed.    Labs:   Lab Results   Component Value Date    WBC 14.96 (H) 12/01/2022    HGB 14.8 12/01/2022    HCT 44.1 12/01/2022    MCV 85 12/01/2022     12/01/2022       Assessment/Plan:   Malignant neoplasm of overlapping sites of left breast in female, estrogen receptor positive  [id number] vs M1 G2 ER/DE strongly positive, Her 2 sean 2+ FISH negative     Patient is 87 years old, previously living independently, now presenting with symptoms secondary to a brain mass in the left medial temporal lobe.  Staging workup showed a left breast mass, biopsy consistent with IDC.    The brain mass  has not been biopsied, presumed to be metastatic from left breast cancer.    She has seen Dr. White who did not recommend breast surgery given the possibility of metastatic disease to the brain.  I had an extensive discussion with the patient and her family.  We discussed goals of care.  She wishes to pursue oral treatments that are appropriate for her and would help prolong her life while maintaining quality of life.    I will complete staging workup with a bone scan.  I will also follow the subcentimeter pulmonary nodules that are too small to be biopsied.  Meanwhile, I will start her on endocrine therapy.  If eventually, she turns out not to have clear metastatic disease, endocrine therapy could be considered a neoadjuvant therapy.    We discussed that while the brain mass has not been biopsied, it is likely a metastatic focus .  Given her 2- disease, it is a little unusual for hormone receptor positive breast cancer to have isolated metastasis to the brain but not impossible.  She would be open to a brain biopsy if it would .  I will get the opinion of our radiation oncology team and discussed the plan with them.  If they are comfortable with SRS to the lesion with close follow-up verses  pursuing a biopsy.  She already has near surgical follow-up and is on a steroid taper.    I will see her back in 2 weeks to finalize our plan.    -      anastrozole (ARIMIDEX) 1 mg Tab; Take 1 tablet (1 mg total) by mouth once daily.  Dispense: 30 tablet; Refill: 2  -     Bone Density Spine And Hip; Future; Expected date: 12/16/2022  -     NM Bone Scan Whole Body; Future; Expected date: 12/16/2022    Mass of left breast, unspecified quadrant  -     Ambulatory referral/consult to Hematology / Oncology    Brain mass  As above.    Long term (current) use of aromatase inhibitors  Start calcium and vitamin-D supplementation while on anastrozole.  Obtain bone density scan at baseline.  -     Bone Density Spine And Hip; Future; Expected date: 12/16/2022    Pulmonary nodules  Known asbestos exposure, has chronic pleural plaques.  Nodules are  Subcentimeter, will be followed up on subsequent imaging.    FH of cancer   Refer to genetics clinic      ECOG SCORE    2 - Capable of all selfcare but unable to carry out any work activities, active > 50% of hours           Discussion:   No follow-ups on file.    Plan was discussed with the patient at length, and she verbalized understanding. Candy was given an opportunity to ask questions that were answered to her satisfaction, and she was advised to call in the interval if any problems or questions arise.    Electronically signed by Hattie Ortez MD      Route Chart for Scheduling    Med Onc Chart Routing      Follow up with physician . 1/6 early afternoon appt   Follow up with [facility]    Infusion scheduling note    Injection scheduling note    Labs    Imaging DXA scan and bone scans   can do imaging at [facility].   Pharmacy appointment    Other referrals

## 2022-12-16 NOTE — DISCHARGE SUMMARY
[facility] - Neurosurgery ([facility])  [facility] Medicine  Discharge Summary      Patient Name: Candy Oreilly  MRN: [id number]  FRANCIS: [id number]  Patient Class: IP- Inpatient  Admission Date: 11/23/2022  Hospital Length of Stay: 7 days  Discharge Date and Time: 12/1/22  Attending Physician: Aleyda att. providers found   Discharging Provider: Robert Chao MD  Primary Care Provider: Chata Hurley MD  [facility] Medicine Team: [facility] MED A Robert Chao MD  Primary Care Team: [facility] MED A    HPI:   86 yo female with CAD, GERD, thyroid disease, arthritis, anxiety presenting to outside hospital for difficulty with word finding and mild disorientation transferred to [facility] for concern of L temporal brain mass. She has been having syncopal episodes as well during this past week as well during the word finding difficulties. She had some LLE tingling as well. This was not getting any better so her family brought her in for evaluation to outside ED. CT showing brain mass, transferred here for DEV vela. She denies any long history of smoking, she denies any weight loss fevers, no abnormal vaginal bleeding. She does occasionally get hemorrhoidal bleeding. She does report some diarrhea that last a while a few weeks ago, but has been clearing up. She denies any melena or BRBPR. She did have normal screening exams but those ended 2/2 age.     In ED, DEV and NCC dane'shalom, approved for floor admission to medicine. Patient started on keppra and steroids. Medicine called for possible cancer workup.      * No surgery found *      Hospital Course:   On 11/25; CT C/A/P ordered yesterday. Result came back today showing;  1. Soft tissue mass in the left breast.  Malignancy not excluded.  Recommend correlation with physical exam and mammogram.   2. Few solid and ground-glass opacities in the lungs as detailed above measuring up to 0.6 cm.  Clinical and oncologic considerations will determine the role and schedule for  continued surveillance.   3. Indeterminate small focus of sclerosis in the anterior C7 vertebral body.  Bone scan could provide further characterization if clinically indicated.   4. 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     Case was discussed with neurosurgery and oncology in details. Neurosurgery will defer brain bx since there is a breast mass that is more accessible for bx. Oncology will plan for breast tissue bx outpt.   Blood sugar levels are very high this morning, likely 2/2 dexamethasone. I ordered lantus and humalog sliding scale. Endo was consulted. Case was discussed with endo.    On 11/26:  Blood pressure was high yesterday due to anxiety.  Patient became anxious and upset after being diagnosed with possible metastatic breast cancer.  She was emotional, crying.  Multiple doses of extra medication were given including 2 doses of hydralazine 25 mg, as well as Coreg 6.25 mg b.i.d. was started.  This morning, blood pressure and mood are better.  Case was discussed with Endocrine, and adjustment to her insulin regimen were added.  I did spoke to PT/OT.  Patient will likely need skilled nursing facility.  I tried to reach neurosurgery to inquire about patient's DEXA regimen outpatient: But no answer as of yet.  I also consulted radiation oncology.    On 11/27; bp was elevated yesterday, norvasc increased from 5 to 10 mg today.  Ophth was consulted for the left eye blurry vision.     On 11/27; 24 h EEG extended from yesterday to today and it was negative.  Pending radiation oncology eval and SNF placement.    On 11/28; case discussed with neurosurgery, who recommended dexa 4q6h for one week followed by 2 week slow taper.   Had mammogram and US guided tissue bx done.  Case discussed with case management, pending SNF placement.    On 11/29; seen by ophth.  Pending SNF placement.    On 11/30; bp still on the high side, increased coreg from 6.25 to 12.5 mg bid.      Patient discharged to SNF.       Goals of Care Treatment Preferences:  Code Status: Full Code      Consults:   Consults (From admission, onward)        Status Ordering Provider     Inpatient consult to Ophthalmology  Once        Provider:  (Not yet assigned)    Completed SRINIVAS MEDINA     Inpatient consult to Radiation Oncology  Once        Provider:  (Not yet assigned)    Completed SRINIVAS MEDINA     Inpatient consult to Endocrinology  Once        Provider:  (Not yet assigned)    Completed SRINIVAS MEDINA     Inpatient consult to Hematology/Oncology  Once        Provider:  (Not yet assigned)    Completed PEDRO WELLS     Inpatient consult to Neurosurgery  Once        Provider:  (Not yet assigned)    Completed LEO LEHMAN          * Brain mass  Suspect metastatic given the left breast mass with possible lung mets seen on CT chest today.   Neurosurgery will defer brain bx since there is a breast mass that is more accessible for bx.   Pt had US guided left breast bx done on 11/28.  Continue dexamethasone 4 mg q6h for 7 days (7/7), then taper over 2 weeks per neurosurgery (will start 4 mg q8h tomorrow).  24 h EEG negative for seizure.  - Rad onc consulted - no urgent need for radiation at this time, recommend review at CNS tumor board  - SBP <160  - Keppra 500 BID  - pt will need neurosurgery and oncology follow up on discharge.      Final Active Diagnoses:    Diagnosis Date Noted POA    PRINCIPAL PROBLEM:  Brain mass [G93.89] 11/23/2022 Yes    Leukocytosis [D72.829] 11/30/2022 Yes    Blurry vision, left eye [H53.8] 11/28/2022 Yes    Adverse effect of glucocorticoid or synthetic analogue [T38.0X5A] 11/26/2022 No    Hypertension [I10] 11/26/2022 Yes    Mass of left breast [N63.20] 11/25/2022 Yes    Type 2 diabetes mellitus without complication, without long-term current use of insulin [E11.9] 11/24/2022 Yes    Anxiety [F41.9] 11/21/2022 Yes    Acquired hypothyroidism [E03.9] 11/21/2022 Yes     Gastroesophageal reflux disease [K21.9] 11/21/2022 Yes    Hypercholesterolemia [E78.00] 11/21/2022 Yes      Problems Resolved During this Admission:       Discharged Condition: good    Disposition: Skilled Nursing Facility    Follow Up:    Patient Instructions:      Ambulatory referral/consult to Hematology / Oncology   Standing Status: Future   Referral Priority: Routine Referral Type: Consultation   Referral Reason: Specialty Services Required   Requested Specialty: Hematology and Oncology   Number of Visits Requested: 1     Ambulatory referral/consult to Endocrinology   Standing Status: Future   Referral Priority: Routine Referral Type: Consultation   Requested Specialty: Endocrinology   Number of Visits Requested: 1     Ambulatory referral/consult to Diabetes Education   Standing Status: Future   Referral Priority: Routine Referral Type: Consultation   Referral Reason: Specialty Services Required   Requested Specialty: Diabetes   Number of Visits Requested: 1 Expiration Date: 11/30/23     Ambulatory referral/consult to Neurosurgery   Standing Status: Future   Referral Priority: Routine Referral Type: Consultation   Referral Reason: Specialty Services Required   Requested Specialty: Neurosurgery   Number of Visits Requested: 1         Pending Diagnostic Studies:     None         Medications:  Reconciled Home Medications:      Medication List      START taking these medications    albuterol-ipratropium 2.5 mg-0.5 mg/3 mL nebulizer solution  Commonly known as: DUO-NEB  Take 3 mLs by nebulization every 6 (six) hours as needed for Wheezing or Shortness of Breath. Rescue     amLODIPine 10 MG tablet  Commonly known as: NORVASC  Take 1 tablet (10 mg total) by mouth once daily.     artificial tears 0.5 % ophthalmic solution  Commonly known as: ISOPTO TEARS  Place 1 drop into both eyes 3 (three) times daily.     carvediloL 6.25 MG tablet  Commonly known as: COREG  Take 1 tablet (6.25 mg total) by mouth 2 (two) times  daily.     hydrALAZINE 25 MG tablet  Commonly known as: APRESOLINE  Take 1 tablet (25 mg total) by mouth every 8 (eight) hours as needed (sbp>160).     * insulin aspart U-100 100 unit/mL (3 mL) Inpn pen  Commonly known as: NovoLOG  Inject 0-5 Units into the skin 4 (four) times daily before meals and nightly. Low dose sliding scale.     * insulin aspart U-100 100 unit/mL (3 mL) Inpn pen  Commonly known as: NovoLOG  Inject 8 Units into the skin 3 (three) times daily.     insulin detemir U-100 100 unit/mL (3 mL) Inpn pen  Commonly known as: Levemir FLEXTOUCH  Inject 18 Units into the skin once daily.     meclizine 25 mg tablet  Commonly known as: ANTIVERT  Take 1 tablet (25 mg total) by mouth every 6 (six) hours as needed for Dizziness.     melatonin 3 mg tablet  Commonly known as: MELATIN  Take 2 tablets (6 mg total) by mouth nightly as needed for Insomnia.     ondansetron 4 MG Tbdl  Commonly known as: ZOFRAN-ODT  Take 1 tablet (4 mg total) by mouth every 8 (eight) hours as needed.     polyethylene glycol 17 gram Pwpk  Commonly known as: GLYCOLAX  Take 17 g by mouth 2 (two) times daily as needed.     simethicone 80 MG chewable tablet  Commonly known as: MYLICON  Take 1 tablet (80 mg total) by mouth 4 (four) times daily as needed.     SITagliptin phosphate 100 MG Tab  Commonly known as: JANUVIA  Take 1 tablet (100 mg total) by mouth once daily.     traZODone 50 MG tablet  Commonly known as: DESYREL  Take 0.5 tablets (25 mg total) by mouth nightly as needed for Insomnia.         * This list has 2 medication(s) that are the same as other medications prescribed for you. Read the directions carefully, and ask your doctor or other care provider to review them with you.            CHANGE how you take these medications    ALPRAZolam 0.5 MG tablet  Commonly known as: XANAX  Take 1 tablet (0.5 mg total) by mouth daily as needed for Anxiety.  What changed: reasons to take this     * dexAMETHasone 1 MG Tab  Commonly known as:  DECADRON  Take 1 tablet (1 mg total) by mouth As instructed. Starting 12/10, take 1 table q12h for 3 days, followed by 1 tablet once a day q24 for 3 days then stop.  What changed:   · medication strength  · how much to take  · when to take this  · reasons to take this  · additional instructions     pantoprazole 40 MG tablet  Commonly known as: PROTONIX  Take 1 tablet (40 mg total) by mouth 2 (two) times daily.  What changed: when to take this         * This list has 1 medication(s) that are the same as other medications prescribed for you. Read the directions carefully, and ask your doctor or other care provider to review them with you.            CONTINUE taking these medications    citalopram 20 MG tablet  Commonly known as: CeleXA  Take 1 tablet by mouth once daily.     fexofenadine 180 MG tablet  Commonly known as: ALLEGRA  Take 1 tablet by mouth once daily.     fluticasone propionate 50 mcg/actuation nasal spray  Commonly known as: FLONASE  1 spray by Each Nostril route once daily.     levETIRAcetam 500 MG Tab  Commonly known as: KEPPRA  Take 1 tablet (500 mg total) by mouth 2 (two) times daily.     levothyroxine 75 MCG tablet  Commonly known as: SYNTHROID  Take 1 tablet by mouth once daily.        STOP taking these medications    atenoloL 50 MG tablet  Commonly known as: TENORMIN     celecoxib 200 MG capsule  Commonly known as: CeleBREX     hydroCHLOROthiazide 25 MG tablet  Commonly known as: HYDRODIURIL        ASK your doctor about these medications    * dexAMETHasone 4 MG Tab  Commonly known as: DECADRON  Take 1 tablet (4 mg total) by mouth As instructed. Take 1 tablet q6h today, then 1 tablet q8h starting tomorrow for 3 days, then 1 tablet q12h starting 12/4 for 3 days, then 1/2 tablet q12h starting 12/7 for 3 days.  Ask about: Should I take this medication?         * This list has 1 medication(s) that are the same as other medications prescribed for you. Read the directions carefully, and ask your doctor  or other care provider to review them with you.                Indwelling Lines/Drains at time of discharge:   Lines/Drains/Airways     None                 Time spent on the discharge of patient: 35 minutes         Robert Chao MD  Department of Hospital Medicine  [facility] - Neurosurgery ([facility])

## 2022-12-19 LAB
FINAL PATHOLOGIC DIAGNOSIS: NORMAL
GROSS: NORMAL
Lab: NORMAL
SUPPLEMENTAL DIAGNOSIS: NORMAL

## 2022-12-20 ENCOUNTER — TELEPHONE (OUTPATIENT)
Dept: HEMATOLOGY/ONCOLOGY | Facility: CLINIC | Age: 87
End: 2022-12-20
Payer: MEDICARE

## 2022-12-20 PROBLEM — R91.8 PULMONARY NODULES: Status: ACTIVE | Noted: 2022-12-20

## 2022-12-20 PROBLEM — Z17.0 MALIGNANT NEOPLASM OF UPPER-INNER QUADRANT OF LEFT BREAST IN FEMALE, ESTROGEN RECEPTOR POSITIVE: Status: ACTIVE | Noted: 2022-12-20

## 2022-12-20 PROBLEM — C79.31 SECONDARY MALIGNANT NEOPLASM OF BRAIN: Status: ACTIVE | Noted: 2022-12-20

## 2022-12-20 PROBLEM — C50.212 MALIGNANT NEOPLASM OF UPPER-INNER QUADRANT OF LEFT BREAST IN FEMALE, ESTROGEN RECEPTOR POSITIVE: Status: ACTIVE | Noted: 2022-12-20

## 2022-12-20 NOTE — TELEPHONE ENCOUNTER
Called pt to schedule genetics appt , phone isn't in service. Called daugther's phone , no answer. Vm box isn't set up.

## 2022-12-21 ENCOUNTER — HOSPITAL ENCOUNTER (OUTPATIENT)
Dept: RADIOLOGY | Facility: HOSPITAL | Age: 87
Discharge: HOME | End: 2022-12-21
Attending: INTERNAL MEDICINE
Payer: MEDICARE

## 2022-12-21 DIAGNOSIS — Z17.0 MALIGNANT NEOPLASM OF OVERLAPPING SITES OF LEFT BREAST IN FEMALE, ESTROGEN RECEPTOR POSITIVE: ICD-10-CM

## 2022-12-21 DIAGNOSIS — C50.812 MALIGNANT NEOPLASM OF OVERLAPPING SITES OF LEFT BREAST IN FEMALE, ESTROGEN RECEPTOR POSITIVE: ICD-10-CM

## 2022-12-21 PROCEDURE — A9503 TC99M MEDRONATE: HCPCS

## 2022-12-21 PROCEDURE — 78306 BONE IMAGING WHOLE BODY: CPT | Mod: TC

## 2022-12-21 PROCEDURE — 78306 BONE IMAGING WHOLE BODY: CPT | Mod: 26,,, | Performed by: RADIOLOGY

## 2022-12-21 PROCEDURE — 78306 NM BONE SCAN WHOLE BODY: ICD-10-PCS | Mod: 26,,, | Performed by: RADIOLOGY

## 2022-12-23 DIAGNOSIS — C50.812 MALIGNANT NEOPLASM OF OVERLAPPING SITES OF LEFT BREAST IN FEMALE, ESTROGEN RECEPTOR POSITIVE: ICD-10-CM

## 2022-12-23 DIAGNOSIS — Z17.0 MALIGNANT NEOPLASM OF OVERLAPPING SITES OF LEFT BREAST IN FEMALE, ESTROGEN RECEPTOR POSITIVE: ICD-10-CM

## 2022-12-23 RX ORDER — ANASTROZOLE 1 MG/1
1 TABLET ORAL DAILY
Qty: 30 TABLET | Refills: 2 | Status: SHIPPED | OUTPATIENT
Start: 2022-12-23 | End: 2022-12-29 | Stop reason: SDUPTHER

## 2022-12-23 NOTE — TELEPHONE ENCOUNTER
"Returned call, spoke to pt's daughter, refill request sent to Dr. Ortez for fill at [facility]. Pharmacy confirmed by pt's daughter to be the correct pharmacy for [facility] where pt resides.         ----- Message from Lucretia Andres sent at 12/22/2022  4:53 PM CST -----  Regarding: Consult/Advisory    Name Of Caller:Self    Contact Preference?: [phone]           What is the nature of the call?: Pt is calling because pt still have not received the medication of anastrozole (ARIMIDEX) 1 mg Tab           Additional Notes:  "Thank you for all that you do for our patients'"     "

## 2022-12-29 DIAGNOSIS — C50.812 MALIGNANT NEOPLASM OF OVERLAPPING SITES OF LEFT BREAST IN FEMALE, ESTROGEN RECEPTOR POSITIVE: ICD-10-CM

## 2022-12-29 DIAGNOSIS — Z17.0 MALIGNANT NEOPLASM OF OVERLAPPING SITES OF LEFT BREAST IN FEMALE, ESTROGEN RECEPTOR POSITIVE: ICD-10-CM

## 2022-12-29 NOTE — TELEPHONE ENCOUNTER
Call returned, staff gone for the day. Left message with reception.         ----- Message from Priya Maddox sent at 12/29/2022  4:30 PM CST -----  Contact: pt  Callback number back to speak with Tahira from nursing       [phone]  Tahira

## 2022-12-29 NOTE — TELEPHONE ENCOUNTER
Returned call, spoke to pt's daughter, who verbalized that the Arimidex has not yet been delivered to pt's nursing home [facility].  Rx re-sent to provider. Was confirmed on 12/23/22 but looks like it printed instead of e-scribe.           ----- Message from Priya Maddox sent at 12/29/2022  4:24 PM CST -----  Contact: pt  Pt requesting a callback to speak with a nurse             Confirmed contact below:  Contact Name:Candy Oreilly  Phone Number: [phone]

## 2022-12-30 RX ORDER — ANASTROZOLE 1 MG/1
1 TABLET ORAL DAILY
Qty: 30 TABLET | Refills: 2 | Status: SHIPPED | OUTPATIENT
Start: 2022-12-30 | End: 2023-02-25 | Stop reason: SDUPTHER

## 2023-01-05 ENCOUNTER — TELEPHONE (OUTPATIENT)
Dept: DIABETES | Facility: CLINIC | Age: 88
End: 2023-01-05
Payer: MEDICARE

## 2023-01-17 ENCOUNTER — TELEPHONE (OUTPATIENT)
Dept: HEMATOLOGY/ONCOLOGY | Facility: CLINIC | Age: 88
End: 2023-01-17
Payer: MEDICARE

## 2023-01-17 ENCOUNTER — OFFICE VISIT (OUTPATIENT)
Dept: RADIATION ONCOLOGY | Facility: CLINIC | Age: 88
End: 2023-01-17
Payer: MEDICARE

## 2023-01-17 VITALS
RESPIRATION RATE: 16 BRPM | HEIGHT: 63 IN | TEMPERATURE: 97 F | OXYGEN SATURATION: 95 % | SYSTOLIC BLOOD PRESSURE: 161 MMHG | BODY MASS INDEX: 33.66 KG/M2 | DIASTOLIC BLOOD PRESSURE: 74 MMHG | HEART RATE: 88 BPM

## 2023-01-17 DIAGNOSIS — C79.31 SECONDARY MALIGNANT NEOPLASM OF BRAIN: Primary | ICD-10-CM

## 2023-01-17 PROCEDURE — 99214 OFFICE O/P EST MOD 30 MIN: CPT | Mod: PBBFAC | Performed by: RADIOLOGY

## 2023-01-17 PROCEDURE — 99215 PR OFFICE/OUTPT VISIT, EST, LEVL V, 40-54 MIN: ICD-10-PCS | Mod: S$PBB,,, | Performed by: RADIOLOGY

## 2023-01-17 PROCEDURE — 99999 PR PBB SHADOW E&M-EST. PATIENT-LVL IV: CPT | Mod: PBBFAC,,, | Performed by: RADIOLOGY

## 2023-01-17 PROCEDURE — 99215 OFFICE O/P EST HI 40 MIN: CPT | Mod: S$PBB,,, | Performed by: RADIOLOGY

## 2023-01-17 PROCEDURE — 99999 PR PBB SHADOW E&M-EST. PATIENT-LVL IV: ICD-10-PCS | Mod: PBBFAC,,, | Performed by: RADIOLOGY

## 2023-01-17 NOTE — PROGRESS NOTES
1/17/2023    Radiation Oncology Consultation    Assessment   This is an 87 y.o. y/o female with Stage IV metastatic breast cancer (cT2 cN0 M1) diagnosed on Left breast biopsy 11/28/22 (IDC, grade 2, ER+, Her2 neg by FISH) with apparent oligometastasis to brain. MRI Brain 11/23/22 demonstrated a 1.6 cm enhancing Left medial temporal lobe mass. Neurosurgery has not recommended resection. She is referred for consideration of radiation therapy.     I discussed the treatment options available for cancer that has spread to the brain including surgical resection, radiosurgery, and whole brain radiation. For large, symptomatic masses, surgery is typically preferred to improve symptoms and reduce risk of radiation-induced edema. For patients with more limited disease, radiosurgery is typically favored to improve local control while limiting side effects of radiation. Whole brain radiation is often recommended when many metastases are present of in certain histologies such as small cell lung cancer.     I recommend re-staging MRI Brain since her last one is now 2 months old. If she has persistent isolated disease, I recommend treating with SRS/SRT with number of fractions depending on the size of the persistent metastasis in order to optimize tumor control while reducing risk of radionecrosis. Potential side effects of radiosurgery were reviewed. At the end of our discussion, they were in agreement with the proposed plan.          Plan   1) Schedule MRI Brain and CT Simulation for radiosurgery treatment planning. I will review the MRI first to confirm limited disease that is still amenable to SRS/SRT prior to simulation.            CHIEF COMPLAINT: Metastatic breast cancer with brain metastasis    HPI/Focused ROS: Ms. Oreilly is an 86 y/o female who presented to outside ED on 11/21/22 with 1 month h/o near syncopal episodes and word finding difficulty. CT Head demonstrated an enhancing mass in the Left temporal lobe. She  presented to [facility] ED and was admitted on 11/23/22. MRI Brain demonstrated a 1.6 cm enhancing lesion in the Left temporal lobe without other evidence of intracranial disease. CT C/A/P 11/24/22 demonstrated a soft tissue mass in the Left breast and sclerotic lesion at C7. She underwent biopsy of the Left breast mass 11/28/22 with pathology revealing invasive ductal carcinoma, grade 2, ER/[place]+, Her2 negative by FISH. She was evaluated by Neurosurgery but it appears that resection was not recommended. She is referred for consideration of radiation therapy.     Today in clinic, the patient is accompanied by her son and daughter. She is currently staying in a nursing home mainly for help with medication management. She has some generalized weakness following Covid infection in late December. Denies focal numbness or weakness. Has felt foggy headed. Dexamethasone was discontinued by her nursing home due to increase in blood sugars. Her word-finding difficulty has returned somewhat.       Is the patient female between ages 15-55:  no    Does the patient have a CIED:  no    Prior Radiation History: None      Past Medical History:   Diagnosis Date    Anxiety disorder, unspecified     Coronary artery disease     GERD (gastroesophageal reflux disease)     Thyroid disease        Past Surgical History:   Procedure Laterality Date    APPENDECTOMY      CHOLECYSTECTOMY         Social History     Tobacco Use    Smoking status: Never    Smokeless tobacco: Never       Cancer-related family history includes Breast cancer in her sister; Cancer in her brother; Lung cancer in her father; Thyroid cancer in her mother.    Current Outpatient Medications on File Prior to Visit   Medication Sig Dispense Refill    albuterol-ipratropium (DUO-NEB) 2.5 mg-0.5 mg/3 mL nebulizer solution Take 3 mLs by nebulization every 6 (six) hours as needed for Wheezing or Shortness of Breath. Rescue 75 mL 0    amLODIPine (NORVASC) 10 MG tablet Take 1 tablet (10 mg  total) by mouth once daily. 30 tablet 11    anastrozole (ARIMIDEX) 1 mg Tab Take 1 tablet (1 mg total) by mouth once daily. 30 tablet 2    artificial tears (ISOPTO TEARS) 0.5 % ophthalmic solution Place 1 drop into both eyes 3 (three) times daily.      citalopram (CELEXA) 20 MG tablet Take 1 tablet by mouth once daily.      fexofenadine (ALLEGRA) 180 MG tablet Take 1 tablet by mouth once daily.      fluticasone propionate (FLONASE) 50 mcg/actuation nasal spray 1 spray by Each Nostril route once daily.      hydrALAZINE (APRESOLINE) 25 MG tablet Take 1 tablet (25 mg total) by mouth every 8 (eight) hours as needed (sbp>160).      levothyroxine (SYNTHROID) 75 MCG tablet Take 1 tablet by mouth once daily.      pantoprazole (PROTONIX) 40 MG tablet Take 1 tablet (40 mg total) by mouth 2 (two) times daily.      simethicone (MYLICON) 80 MG chewable tablet Take 1 tablet (80 mg total) by mouth 4 (four) times daily as needed.  0    SITagliptin phosphate (JANUVIA) 100 MG Tab Take 1 tablet (100 mg total) by mouth once daily. 90 tablet 3    traZODone (DESYREL) 50 MG tablet Take 0.5 tablets (25 mg total) by mouth nightly as needed for Insomnia.      ALPRAZolam (XANAX) 0.5 MG tablet Take 1 tablet (0.5 mg total) by mouth daily as needed for Anxiety. 7 tablet 0    carvediloL (COREG) 6.25 MG tablet Take 1 tablet (6.25 mg total) by mouth 2 (two) times daily. 60 tablet 11    insulin aspart U-100 (NOVOLOG) 100 unit/mL (3 mL) InPn pen Inject 0-5 Units into the skin 4 (four) times daily before meals and nightly. Low dose sliding scale.  0    insulin aspart U-100 (NOVOLOG) 100 unit/mL (3 mL) InPn pen Inject 8 Units into the skin 3 (three) times daily.  0    insulin detemir U-100 (LEVEMIR FLEXTOUCH) 100 unit/mL (3 mL) SubQ InPn pen Inject 18 Units into the skin once daily.  0    levETIRAcetam (KEPPRA) 500 MG Tab Take 1 tablet (500 mg total) by mouth 2 (two) times daily. 60 tablet 0    meclizine (ANTIVERT) 25 mg tablet Take 1 tablet (25 mg  "total) by mouth every 6 (six) hours as needed for Dizziness.  0    melatonin (MELATIN) 3 mg tablet Take 2 tablets (6 mg total) by mouth nightly as needed for Insomnia.  0    ondansetron (ZOFRAN-ODT) 4 MG TbDL Take 1 tablet (4 mg total) by mouth every 8 (eight) hours as needed.      polyethylene glycol (GLYCOLAX) 17 gram PwPk Take 17 g by mouth 2 (two) times daily as needed.  0     No current facility-administered medications on file prior to visit.       Review of patient's allergies indicates:   Allergen Reactions    Pregabalin Swelling    Atorvastatin Other (See Comments)     Muscle weakness      Dexlansoprazole Other (See Comments)     Other reaction(s): HAND SPASMS  Muscle weakness      Ezetimibe Other (See Comments)     Other reaction(s): MUSCLE PAIN  Muscle pain      Gabapentin Other (See Comments)     Other reaction(s): MAKES HER DIZZY  Dizziness      Onabotulinumtoxina      Medical botox    Oxybutynin Other (See Comments)     Other reaction(s): CANT PEE  Stopped urine flow      Rosuvastatin Other (See Comments)     Leg muscle pain      Sertraline Other (See Comments)     Shaking of hands      Meperidine Nausea Only         Vital Signs: BP (!) 161/74 (BP Location: Right arm, Patient Position: Sitting)   Pulse 88   Temp 97.2 °F (36.2 °C)   Resp 16   Ht 5' 3" (1.6 m)   LMP  (LMP Unknown)   SpO2 95%   BMI 33.66 kg/m²     ECOG Performance Status: 2 - Ambulates, capable of self care only    Physical Exam  Vitals reviewed.   Constitutional:       Appearance: Normal appearance.   HENT:      Head: Normocephalic and atraumatic.   Eyes:      General: No scleral icterus.     Extraocular Movements: Extraocular movements intact.   Pulmonary:      Effort: No accessory muscle usage or respiratory distress.   Abdominal:      General: There is no distension.   Musculoskeletal:         General: Normal range of motion.      Cervical back: Normal range of motion and neck supple.   Lymphadenopathy:      Cervical: No " cervical adenopathy.   Skin:     General: Skin is dry.      Coloration: Skin is not jaundiced.   Neurological:      Mental Status: She is alert and oriented to person, place, and time.      Cranial Nerves: No cranial nerve deficit.      Comments: In personal wheelchair; occasional word-finding difficulty and misplaced words   Psychiatric:         Mood and Affect: Mood and affect normal.         Judgment: Judgment normal.        Labs:    Imaging: I have personally reviewed the patient's available images and reports and summarized pertinent findings above in HPI.     Pathology: I have personally reviewed the patient's available pathology and summarized pertinent findings above in HPI.       - Thank you for allowing me to participate in the care of your patient.    Henry Lugo MD, PhD

## 2023-01-17 NOTE — TELEPHONE ENCOUNTER
----- Message from Corinne E Coniglio, RN sent at 1/13/2023 11:32 AM CST -----  Maryellen Leonard,   She needs a follow up  with Dr. Ortez, can you help?  Corinne

## 2023-01-19 ENCOUNTER — HOSPITAL ENCOUNTER (OUTPATIENT)
Dept: RADIOLOGY | Facility: HOSPITAL | Age: 88
Discharge: HOME | End: 2023-01-19
Attending: RADIOLOGY
Payer: MEDICARE

## 2023-01-19 DIAGNOSIS — C79.31 SECONDARY MALIGNANT NEOPLASM OF BRAIN: ICD-10-CM

## 2023-01-19 PROCEDURE — 70553 MRI BRAIN STEM W/O & W/DYE: CPT | Mod: TC

## 2023-01-19 PROCEDURE — 70553 MRI BRAIN STEM W/O & W/DYE: CPT | Mod: 26,,, | Performed by: RADIOLOGY

## 2023-01-19 PROCEDURE — 70553 MRI BRAIN W WO CONTRAST: ICD-10-PCS | Mod: 26,,, | Performed by: RADIOLOGY

## 2023-01-19 PROCEDURE — A9585 GADOBUTROL INJECTION: HCPCS | Performed by: RADIOLOGY

## 2023-01-19 PROCEDURE — 25500020 PHARM REV CODE 255: Performed by: RADIOLOGY

## 2023-01-19 RX ORDER — GADOBUTROL 604.72 MG/ML
9 INJECTION INTRAVENOUS
Status: COMPLETED | OUTPATIENT
Start: 2023-01-19 | End: 2023-01-19

## 2023-01-19 RX ADMIN — GADOBUTROL 9 ML: 604.72 INJECTION INTRAVENOUS at 07:01

## 2023-01-20 DIAGNOSIS — C79.31 SECONDARY MALIGNANT NEOPLASM OF BRAIN: Primary | ICD-10-CM

## 2023-01-20 NOTE — PROGRESS NOTES
I spoke with patient's daughter, Tyesha, to communicate results of MRI Brain 1/19/23. The Left temporal enhancing lesion has significantly reduced in size and no longer has surrounding edema. No evidence of metastases in the rest of the brain.     Given this favorable response combined with her age/performance status, I recommend continuing with short interval observation of the brain and continuation of systemic therapy. She was in agreement with this plan.     Plan:  1) We will cancel CT Simulation scheduled for 1/25/23; patient's family knows not to show up to this visit.   2) I will see her back in 2 months with MRI Brain prior for re-staging.   3) Encouraged to keep her follow-up visit with Dr. Ortez on 1/25/23 for continued systemic management of breast cancer.

## 2023-01-25 ENCOUNTER — OFFICE VISIT (OUTPATIENT)
Dept: HEMATOLOGY/ONCOLOGY | Facility: CLINIC | Age: 88
End: 2023-01-25
Payer: MEDICARE

## 2023-01-25 VITALS
WEIGHT: 193.56 LBS | RESPIRATION RATE: 18 BRPM | HEART RATE: 87 BPM | SYSTOLIC BLOOD PRESSURE: 172 MMHG | TEMPERATURE: 99 F | BODY MASS INDEX: 34.3 KG/M2 | DIASTOLIC BLOOD PRESSURE: 77 MMHG | OXYGEN SATURATION: 97 % | HEIGHT: 63 IN

## 2023-01-25 DIAGNOSIS — C50.812 MALIGNANT NEOPLASM OF OVERLAPPING SITES OF LEFT BREAST IN FEMALE, ESTROGEN RECEPTOR POSITIVE: Primary | ICD-10-CM

## 2023-01-25 DIAGNOSIS — C79.31 SECONDARY MALIGNANT NEOPLASM OF BRAIN: ICD-10-CM

## 2023-01-25 DIAGNOSIS — Z17.0 MALIGNANT NEOPLASM OF UPPER-INNER QUADRANT OF LEFT BREAST IN FEMALE, ESTROGEN RECEPTOR POSITIVE: ICD-10-CM

## 2023-01-25 DIAGNOSIS — N63.20 MASS OF LEFT BREAST, UNSPECIFIED QUADRANT: ICD-10-CM

## 2023-01-25 DIAGNOSIS — E11.65 TYPE 2 DIABETES MELLITUS WITH HYPERGLYCEMIA, WITH LONG-TERM CURRENT USE OF INSULIN: ICD-10-CM

## 2023-01-25 DIAGNOSIS — Z79.4 TYPE 2 DIABETES MELLITUS WITH HYPERGLYCEMIA, WITH LONG-TERM CURRENT USE OF INSULIN: ICD-10-CM

## 2023-01-25 DIAGNOSIS — Z17.0 MALIGNANT NEOPLASM OF OVERLAPPING SITES OF LEFT BREAST IN FEMALE, ESTROGEN RECEPTOR POSITIVE: Primary | ICD-10-CM

## 2023-01-25 DIAGNOSIS — R91.8 PULMONARY NODULES: ICD-10-CM

## 2023-01-25 DIAGNOSIS — C50.212 MALIGNANT NEOPLASM OF UPPER-INNER QUADRANT OF LEFT BREAST IN FEMALE, ESTROGEN RECEPTOR POSITIVE: ICD-10-CM

## 2023-01-25 DIAGNOSIS — G93.89 BRAIN MASS: ICD-10-CM

## 2023-01-25 DIAGNOSIS — Z79.811 LONG TERM (CURRENT) USE OF AROMATASE INHIBITORS: ICD-10-CM

## 2023-01-25 PROCEDURE — 99215 OFFICE O/P EST HI 40 MIN: CPT | Mod: PBBFAC | Performed by: INTERNAL MEDICINE

## 2023-01-25 PROCEDURE — 99215 OFFICE O/P EST HI 40 MIN: CPT | Mod: S$PBB,,, | Performed by: INTERNAL MEDICINE

## 2023-01-25 PROCEDURE — 99215 PR OFFICE/OUTPT VISIT, EST, LEVL V, 40-54 MIN: ICD-10-PCS | Mod: S$PBB,,, | Performed by: INTERNAL MEDICINE

## 2023-01-25 PROCEDURE — 99999 PR PBB SHADOW E&M-EST. PATIENT-LVL V: CPT | Mod: PBBFAC,,, | Performed by: INTERNAL MEDICINE

## 2023-01-25 PROCEDURE — 99999 PR PBB SHADOW E&M-EST. PATIENT-LVL V: ICD-10-PCS | Mod: PBBFAC,,, | Performed by: INTERNAL MEDICINE

## 2023-01-25 NOTE — PROGRESS NOTES
"                                                 CONSULT NOTE    Subjective:       Patient ID: Candy Oreilly is a 87 y.o. female.  MRN: [id number]  : 1935    Chief Complaint: Left breast IDC, brain mass     History of Present Illness:   Candy Oreilly is a 87 y.o. female who is referred for Left breast IDC    Ms. Candy Oreilly is an 87 year old woman with CAD, hypothyroidism who presented as a transfer for temporal brain mass,seen on  while inpatient by the oncology team. She initially presented with difficulty with word finding and mild disorientation. She has associated headaches and nausea. She had a MRI brain on  that showed enhancing mass in the medial temporal lobe. When she was admitted, Neurosurgery was consulted. She was started on dexamethasone and keppra with clinical improvement.    Staging work up showed a 2 cm left breast mass, biopsied to be IDC, G2, ER/NJ strongly positive, Her 2 sean 2+, FISH negative,      She is a never smoker.   FH:  father with lung, prostate, colon cancer, a mother with thyroid cancer, a sister with breast cancer and an uncle with "multiple" cancers.      At [facility].     Interim history:   She has seen Dr. Lugo who repeated a brain MRI.   Has completed bone scan and bone density scan.     Still at [facility], had COVID three, still recovering. Has been diagnosed with Diabetes, thought to be related to recent steroid. In a wheel chair today, reports intermittent dizziness     Started anastrozole . No exacerbations of joint pains. Intermittent hot flashes .      Oncology History:  22  FINDINGS:  An enhancing mass in the left medial temporal lobe measures 1.5 x 1.9 cm in size, with surrounding edema (series 4, image 21).  There is no definite additional enhancing mass identified.     There is local mass effect without midline shift or herniation.  There is effacement of the left temporal horn.  There is no midline shift or " downward herniation.  The basal cisterns are patent.  There is no abnormal extra-axial fluid collection.  The major vessels enhance normally.  The calvarium and skull base are intact.     Impression:     Enhancing left medial temporal lobe mass with surrounding edema, concerning for malignancy.     11/23/22  Brain MRI     Impression:     Enhancing mass in the medial temporal lobe possibly interventricular.  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     Minimal surrounding vasogenic type edema with no mass effect.     No restricted diffusion to suggest acute infarct.  11/24/22  CT chest abd pelvis  Impression:     1. Soft tissue mass in the left breast.  Malignancy not excluded.  Recommend correlation with physical exam and mammogram.  2. Few solid and ground-glass opacities in the lungs as detailed above measuring up to 0.6 cm.  Clinical and oncologic considerations will determine the role and schedule for continued surveillance.  3. Indeterminate small focus of sclerosis in the anterior C7 vertebral body.  Bone scan could provide further characterization if clinically indicated.  4. 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  5. Additional findings as above.    11/25/22     Impression:  Left  Mass: Left breast 20 mm x 14 mm x 18 mm mass at the 11 o'clock position. Assessment: 4 - Suspicious finding. Biopsy is recommended.      Right  Mammo Digital Diagnostic Bilat with Prince  There is no mammographic evidence of malignancy in the right breast.     US Breast Bilateral Limited  There is no sonographic evidence of malignancy in the right breast.     BI-RADS Category:   Overall: 4 - Suspicious    11/28/22  Final Pathologic Diagnosis Left breast, ultrasound guided core biopsy:   Invasive ductal carcinoma, moderately differentiated   Meghana grade 2: Tubule formation-3, nuclear pleomorphism-2 and mitotic   count -2   No carcinoma in Situ or  lymphovascular invasion is identified   All submitted cores are involved by invasive carcinoma   Tumor biomarkers   Estrogen receptor (ER):  Positive, nearly 100%, strong   Progesterone receptor (PGR):  Positive nearly 100%, strong   HER2 (IHC):  Equivocal, 2+ (will submit for HER2 fish)   Ki-67:  50%   Additional IHC:   AE1/AE3:  Highlights the extent of tumor cells   GATA3:  Strongly, diffusely positive, most compatible with breast   differentiation   CK7:  Strongly positive      Brain MRI 1/17/23     Impression:     Continue though reduced sized enhancing lesion medial left temporal lobe with essential resolution of previous associated edema signal abnormality.This measures 1.0 by 0.3 by 0.4 cm in AP by TV by CC dimensions previously measuring 2.0 x 1.5 x 1.2 cm.       No new lesion or significant new signal abnormality to suggest worsening metastatic disease in light of history     Ventricles relatively stable without hydrocephalus.  No evidence for acute infarction     Clinical correlation and continued follow-up advised       History:  Past Medical History:   Diagnosis Date    Anxiety disorder, unspecified     Coronary artery disease     GERD (gastroesophageal reflux disease)     Thyroid disease       Past Surgical History:   Procedure Laterality Date    APPENDECTOMY      CHOLECYSTECTOMY       Family History   Problem Relation Age of Onset    Thyroid cancer Mother     Lung cancer Father     Colon cancer Father     Breast cancer Sister     Cancer Brother       Social History     Tobacco Use    Smoking status: Never    Smokeless tobacco: Never   Substance and Sexual Activity    Alcohol use: Not on file    Drug use: Not on file    Sexual activity: Not on file        ROS:   Review of Systems   Constitutional:  Negative for fever, malaise/fatigue and weight loss.   HENT:  Negative for congestion, hearing loss, nosebleeds and sore throat.    Eyes:  Negative for double vision and photophobia.   Respiratory:  Negative  "for cough, hemoptysis, sputum production, shortness of breath and wheezing.    Cardiovascular:  Negative for chest pain, palpitations, orthopnea and leg swelling.   Gastrointestinal:  Negative for abdominal pain, blood in stool, constipation, diarrhea, heartburn, nausea and vomiting.   Genitourinary:  Negative for dysuria, hematuria and urgency.   Musculoskeletal:  Negative for back pain, joint pain and myalgias.   Skin:  Negative for itching and rash.   Neurological:  Positive for dizziness and weakness. Negative for tingling, seizures and headaches.   Endo/Heme/Allergies:  Negative for polydipsia. Does not bruise/bleed easily.   Psychiatric/Behavioral:  Negative for depression and memory loss. The patient is not nervous/anxious and does not have insomnia.       Objective:     Vitals:    01/25/23 1417   BP: (!) 172/77   Pulse: 87   Resp: 18   Temp: 98.6 °F (37 °C)   TempSrc: Oral   SpO2: 97%   Weight: 87.8 kg (193 lb 9 oz)   Height: 5' 3" (1.6 m)   PainSc: 0-No pain       Physical Examination:   Physical Exam  Vitals and nursing note reviewed.   Constitutional:       General: She is not in acute distress.     Appearance: She is not diaphoretic.   HENT:      Head: Normocephalic.      Mouth/Throat:      Pharynx: No oropharyngeal exudate.   Eyes:      General: No scleral icterus.     Conjunctiva/sclera: Conjunctivae normal.   Neck:      Thyroid: No thyromegaly.   Cardiovascular:      Rate and Rhythm: Normal rate and regular rhythm.      Heart sounds: Normal heart sounds. No murmur heard.  Pulmonary:      Effort: Pulmonary effort is normal. No respiratory distress.      Breath sounds: No stridor. No wheezing or rales.   Chest:      Chest wall: No tenderness.   Abdominal:      General: Bowel sounds are normal. There is no distension.      Palpations: Abdomen is soft. There is no mass.      Tenderness: There is no abdominal tenderness. There is no rebound.   Musculoskeletal:         General: No tenderness or deformity. " Normal range of motion.      Cervical back: Neck supple.   Lymphadenopathy:      Cervical: No cervical adenopathy.   Skin:     General: Skin is warm and dry.      Findings: No erythema or rash.   Neurological:      Mental Status: She is alert and oriented to person, place, and time.      Cranial Nerves: No cranial nerve deficit.      Coordination: Coordination normal.      Gait: Gait is intact.      Comments: Wheel chair bound   Psychiatric:         Mood and Affect: Affect normal.         Cognition and Memory: Memory normal.         Judgment: Judgment normal.        Diagnostic Tests:  Significant Imaging: I have reviewed and interpreted all pertinent imaging results/findings.      Laboratory Data:  All pertinent labs have been reviewed.    Labs:   Lab Results   Component Value Date    WBC 14.96 (H) 12/01/2022    HGB 14.8 12/01/2022    HCT 44.1 12/01/2022    MCV 85 12/01/2022     12/01/2022       Assessment/Plan:   Malignant neoplasm of overlapping sites of left breast in female, estrogen receptor positive  [id number] vs M1 G2 ER/NJ strongly positive, Her 2 sean 2+ FISH negative     Patient is 87 years old, previously living independently, recently presented with symptoms secondary to a brain mass in the left medial temporal lobe.  Staging workup showed a left breast mass, biopsy consistent with IDC.    The brain mass  has not been biopsied, presumed to be metastatic from left breast cancer.    She has seen Dr. White who did not recommend breast surgery given the possibility of metastatic disease to the brain.  I had an extensive discussion with the patient and her family.  We discussed goals of care.  She wishes to pursue  treatments that are appropriate for her and would help prolong her life while maintaining quality of life.    She has started anastrozole and tolerating it reasonably well. DEXA scan is normal.   Bone scan did not show any metastatic disease.     Dr. Lugo's evaluation is appreciated. The left  temporal brain mass has decreased in size, likely responded to steroids and less likely AI. Plan is noted to continue systemic therapy and repeat a short term MRI of the brain. If there is progression, SRS is planned.     I recommend continuing single agent anastrozole at this time. Will repeat a left breast mammogram and CT scans to monitor for systemic response.See previous notes for extensive discussions.        Brain mass  As above.    Long term (current) use of aromatase inhibitors  Continue calcium and vitamin-D supplementation while on anastrozole.  Normal bone density scan at baseline.    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  Nodules are  Subcentimeter, will be followed up on subsequent imaging.    FH of cancer   Refer to genetics clinic     Diabetes Mellitus   Refer to Endocrine, being managed at [facility]. On insulin and Januvia.      ECOG SCORE    2 - Capable of all selfcare but unable to carry out any work activities, active > 50% of hours           Discussion:   No follow-ups on file.    Plan was discussed with the patient at length, and she verbalized understanding. Candy was given an opportunity to ask questions that were answered to her satisfaction, and she was advised to call in the interval if any problems or questions arise.    Electronically signed by Hattie Ortez MD      Route Chart for Scheduling    Med Onc Chart Routing      Follow up with physician . 3/22/23 with same day labs   Follow up with [facility]    Infusion scheduling note    Injection scheduling note    Labs CBC and CMP   Lab interval:     Imaging CT chest abdomen pelvis and mammogram   left mammogram and ct scan on 3/21, at [facility]   Pharmacy appointment    Other referrals

## 2023-02-17 ENCOUNTER — TELEPHONE (OUTPATIENT)
Dept: ADMINISTRATIVE | Facility: HOSPITAL | Age: 88
End: 2023-02-17
Payer: MEDICARE

## 2023-02-20 PROBLEM — J18.9 PNEUMONIA: Status: RESOLVED | Noted: 2022-11-21 | Resolved: 2023-02-20

## 2023-02-24 ENCOUNTER — TELEPHONE (OUTPATIENT)
Dept: INTERNAL MEDICINE | Facility: CLINIC | Age: 88
End: 2023-02-24
Payer: MEDICARE

## 2023-02-24 NOTE — TELEPHONE ENCOUNTER
Spoke w/ pt's daughter and she is requesting refills on her mother's medications. Pt's daughter states that the skilled nursing facility that she was previously at will not refill bc she is no longer there. I notified thee pt's daughter that I would send a message to the provider but it not set in stone that the provider will be able to assist. Pt's daughter understood.

## 2023-02-24 NOTE — TELEPHONE ENCOUNTER
----- Message from Nasra Crook MA sent at 2/24/2023  3:43 PM CST -----    ----- Message -----  From: Francisco Reno RN  Sent: 2/24/2023   3:32 PM CST  To: [facility] Clinical Staff    I'm not sure if this is the correct box, but the pt has an appt with Sven Wells 03/07/23 to establish care.  The pt's dtr called to see if this appt can be moved up because the pt will run out of some of her meds before 3/7/23.  Please call the pt's dtr at [phone].  Also, please let me know if I should send this request to a different box.  Thanks

## 2023-02-25 ENCOUNTER — NURSE TRIAGE (OUTPATIENT)
Dept: ADMINISTRATIVE | Facility: CLINIC | Age: 88
End: 2023-02-25
Payer: MEDICARE

## 2023-02-25 NOTE — TELEPHONE ENCOUNTER
Left a message on yesterday for the breast clinic. Pt is out of her Arimidex and is needing a refill.Dr. Hampton contacted and requested call be placed to Dr. Castorena. Dr. Castorena contacted and call answered by Dr. Hanna. Dr. Hanna states that he will make sure that Dr. Castorena gets the message. Pt advised to call back if not call back within 2 hours.   Reason for Disposition   [1] Caller has URGENT medicine question about med that PCP or specialist prescribed AND [2] triager unable to answer question    Protocols used: Medication Question Call-A-

## 2023-02-25 NOTE — TELEPHONE ENCOUNTER
Reason for Disposition   [1] Prescription refill request for ESSENTIAL medicine (i.e., likelihood of harm to patient if not taken) AND [2] triager unable to refill per department policy    Additional Information   Negative: New-onset or worsening symptoms, see that guideline (e.g., diarrhea, runny nose, sore throat)   Negative: Medicine question not related to refill or renewal   Negative: Caller (e.g., patient or pharmacist) requesting information about a new medicine   Negative: Caller requesting information unrelated to medicine    Protocols used: Medication Refill and Renewal Call-A-    Pt's daughter Tyesha called back to report the Provider never contacted her about the pt's Anastrozole refill request.    Call placed to Provider on call Dr. Castorena. She states she'll send it now and requested a secure chat with pt's information.    Pt's daughter Tyesha ([phone]) stated the medication should go to [facility] at [phone]. She states the pt takes Anastrozole 1 mg tab daily.    Instructed to call OOC back if the medication is not at pharmacy in 1 hour. Caller verbalized understanding. Secure chat sent to Dr. Castorena.

## 2023-03-07 ENCOUNTER — LAB VISIT (OUTPATIENT)
Dept: LAB | Facility: HOSPITAL | Age: 88
End: 2023-03-07
Payer: MEDICARE

## 2023-03-07 ENCOUNTER — OFFICE VISIT (OUTPATIENT)
Dept: INTERNAL MEDICINE | Facility: CLINIC | Age: 88
End: 2023-03-07
Payer: MEDICARE

## 2023-03-07 VITALS — HEART RATE: 95 BPM | RESPIRATION RATE: 12 BRPM | DIASTOLIC BLOOD PRESSURE: 80 MMHG | SYSTOLIC BLOOD PRESSURE: 134 MMHG

## 2023-03-07 DIAGNOSIS — G93.89 BRAIN MASS: ICD-10-CM

## 2023-03-07 DIAGNOSIS — M19.90 ARTHRITIS: ICD-10-CM

## 2023-03-07 DIAGNOSIS — I10 PRIMARY HYPERTENSION: ICD-10-CM

## 2023-03-07 DIAGNOSIS — F41.9 ANXIETY: ICD-10-CM

## 2023-03-07 DIAGNOSIS — C50.212 MALIGNANT NEOPLASM OF UPPER-INNER QUADRANT OF LEFT BREAST IN FEMALE, ESTROGEN RECEPTOR POSITIVE: ICD-10-CM

## 2023-03-07 DIAGNOSIS — E03.9 ACQUIRED HYPOTHYROIDISM: ICD-10-CM

## 2023-03-07 DIAGNOSIS — Z17.0 MALIGNANT NEOPLASM OF UPPER-INNER QUADRANT OF LEFT BREAST IN FEMALE, ESTROGEN RECEPTOR POSITIVE: ICD-10-CM

## 2023-03-07 DIAGNOSIS — E11.9 TYPE 2 DIABETES MELLITUS WITHOUT COMPLICATION, WITHOUT LONG-TERM CURRENT USE OF INSULIN: Primary | ICD-10-CM

## 2023-03-07 DIAGNOSIS — K21.9 GASTROESOPHAGEAL REFLUX DISEASE WITHOUT ESOPHAGITIS: ICD-10-CM

## 2023-03-07 DIAGNOSIS — E11.9 TYPE 2 DIABETES MELLITUS WITHOUT COMPLICATION, WITHOUT LONG-TERM CURRENT USE OF INSULIN: ICD-10-CM

## 2023-03-07 LAB
ESTIMATED AVG GLUCOSE: 128 MG/DL (ref 68–131)
HBA1C MFR BLD: 6.1 % (ref 4–5.6)

## 2023-03-07 PROCEDURE — 83036 HEMOGLOBIN GLYCOSYLATED A1C: CPT

## 2023-03-07 PROCEDURE — 36415 COLL VENOUS BLD VENIPUNCTURE: CPT

## 2023-03-07 PROCEDURE — 99999 PR PBB SHADOW E&M-EST. PATIENT-LVL I: ICD-10-PCS | Mod: PBBFAC,GC,,

## 2023-03-07 PROCEDURE — 99999 PR PBB SHADOW E&M-EST. PATIENT-LVL I: CPT | Mod: PBBFAC,GC,,

## 2023-03-07 PROCEDURE — 99211 OFF/OP EST MAY X REQ PHY/QHP: CPT | Mod: PBBFAC

## 2023-03-07 PROCEDURE — 84443 ASSAY THYROID STIM HORMONE: CPT

## 2023-03-07 PROCEDURE — 99205 PR OFFICE/OUTPT VISIT, NEW, LEVL V, 60-74 MIN: ICD-10-PCS | Mod: S$PBB,GC,,

## 2023-03-07 PROCEDURE — 99205 OFFICE O/P NEW HI 60 MIN: CPT | Mod: S$PBB,GC,,

## 2023-03-07 RX ORDER — MECLIZINE HYDROCHLORIDE 25 MG/1
25 TABLET ORAL EVERY 6 HOURS PRN
Refills: 0
Start: 2023-03-07 | End: 2023-05-31 | Stop reason: SDUPTHER

## 2023-03-07 RX ORDER — ALPRAZOLAM 0.5 MG/1
0.5 TABLET ORAL DAILY PRN
Qty: 7 TABLET | Refills: 0 | Status: SHIPPED | OUTPATIENT
Start: 2023-03-07 | End: 2023-03-21 | Stop reason: SDUPTHER

## 2023-03-07 RX ORDER — TRAZODONE HYDROCHLORIDE 50 MG/1
25 TABLET ORAL NIGHTLY PRN
Start: 2023-03-07 | End: 2023-03-21 | Stop reason: SDUPTHER

## 2023-03-07 RX ORDER — PANTOPRAZOLE SODIUM 40 MG/1
40 TABLET, DELAYED RELEASE ORAL 2 TIMES DAILY
Start: 2023-03-07 | End: 2023-03-31 | Stop reason: SDUPTHER

## 2023-03-07 RX ORDER — CARVEDILOL 6.25 MG/1
6.25 TABLET ORAL 2 TIMES DAILY
Qty: 60 TABLET | Refills: 11
Start: 2023-03-07 | End: 2024-03-06

## 2023-03-07 RX ORDER — CITALOPRAM 20 MG/1
20 TABLET, FILM COATED ORAL DAILY
Qty: 30 TABLET | Refills: 6 | Status: SHIPPED | OUTPATIENT
Start: 2023-03-07 | End: 2023-07-12 | Stop reason: SDUPTHER

## 2023-03-07 RX ORDER — AMLODIPINE BESYLATE 10 MG/1
10 TABLET ORAL DAILY
Qty: 30 TABLET | Refills: 11
Start: 2023-03-07 | End: 2024-03-06

## 2023-03-07 RX ORDER — LEVOTHYROXINE SODIUM 75 UG/1
75 TABLET ORAL DAILY
Qty: 30 TABLET | Refills: 11 | Status: SHIPPED | OUTPATIENT
Start: 2023-03-07 | End: 2023-05-03 | Stop reason: SDUPTHER

## 2023-03-07 RX ORDER — INSULIN PUMP SYRINGE, 3 ML
EACH MISCELLANEOUS
Qty: 1 EACH | Refills: 0 | Status: SHIPPED | OUTPATIENT
Start: 2023-03-07 | End: 2024-03-06

## 2023-03-07 NOTE — PROGRESS NOTES
Clinic Note  3/7/2023      Subjective:       Patient ID:  Candy is a 88 y.o. female here to establish care.      Chief Complaint: No chief complaint on file.    HPI  Ms. Oreilly reports today she is having pain in her shoulders, arms, and neck. Daughter adds she has been very uncomfortable and taking Celebrex. The patient states she is also nauseated. She  has been having increased pain, dizziness, weakness, and nausea. She follows with Dr. 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 She reports balance has been difficult since discovery of the brain mass.     Family History   Problem Relation Age of Onset    Thyroid cancer Mother     Lung cancer Father     Colon cancer Father     Breast cancer Sister     Cancer Brother      Social History     Socioeconomic History    Marital status:    Tobacco Use    Smoking status: Never    Smokeless tobacco: Never     Past Surgical History:   Procedure Laterality Date    APPENDECTOMY      BREAST BIOPSY      CHOLECYSTECTOMY       Medication List with Changes/Refills   New Medications    BLOOD SUGAR DIAGNOSTIC STRP    1 each by Misc.(Non-Drug; Combo Route) route 4 (four) times daily before meals and nightly.    BLOOD-GLUCOSE METER KIT    Check glucose before meals and before bed   Current Medications    ALBUTEROL-IPRATROPIUM (DUO-NEB) 2.5 MG-0.5 MG/3 ML NEBULIZER SOLUTION    Take 3 mLs by nebulization every 6 (six) hours as needed for Wheezing or Shortness of Breath. Rescue    ANASTROZOLE (ARIMIDEX) 1 MG TAB    Take 1 tablet (1 mg total) by mouth once daily.    ARTIFICIAL TEARS (ISOPTO TEARS) 0.5 % OPHTHALMIC SOLUTION    Place 1 drop into both eyes 3 (three) times daily.    FEXOFENADINE (ALLEGRA) 180 MG TABLET    Take 1 tablet by mouth once daily.    FLUTICASONE PROPIONATE (FLONASE) 50 MCG/ACTUATION NASAL SPRAY    1 spray by Each Nostril route once daily.    HYDRALAZINE (APRESOLINE) 25 MG TABLET    Take 1 tablet (25 mg total) by mouth every 8 (eight) hours as needed  (sbp>160).    INSULIN ASPART U-100 (NOVOLOG) 100 UNIT/ML (3 ML) INPN PEN    Inject 0-5 Units into the skin 4 (four) times daily before meals and nightly. Low dose sliding scale.    INSULIN ASPART U-100 (NOVOLOG) 100 UNIT/ML (3 ML) INPN PEN    Inject 8 Units into the skin 3 (three) times daily.    INSULIN DETEMIR U-100 (LEVEMIR FLEXTOUCH) 100 UNIT/ML (3 ML) SUBQ INPN PEN    Inject 18 Units into the skin once daily.    LEVETIRACETAM (KEPPRA) 500 MG TAB    Take 1 tablet (500 mg total) by mouth 2 (two) times daily.    MELATONIN (MELATIN) 3 MG TABLET    Take 2 tablets (6 mg total) by mouth nightly as needed for Insomnia.    ONDANSETRON (ZOFRAN-ODT) 4 MG TBDL    Take 1 tablet (4 mg total) by mouth every 8 (eight) hours as needed.    POLYETHYLENE GLYCOL (GLYCOLAX) 17 GRAM PWPK    Take 17 g by mouth 2 (two) times daily as needed.    SIMETHICONE (MYLICON) 80 MG CHEWABLE TABLET    Take 1 tablet (80 mg total) by mouth 4 (four) times daily as needed.    SITAGLIPTIN PHOSPHATE (JANUVIA) 100 MG TAB    Take 1 tablet (100 mg total) by mouth once daily.   Changed and/or Refilled Medications    Modified Medication Previous Medication    ALPRAZOLAM (XANAX) 0.5 MG TABLET ALPRAZolam (XANAX) 0.5 MG tablet       Take 1 tablet (0.5 mg total) by mouth daily as needed for Anxiety.    Take 1 tablet (0.5 mg total) by mouth daily as needed for Anxiety.    AMLODIPINE (NORVASC) 10 MG TABLET amLODIPine (NORVASC) 10 MG tablet       Take 1 tablet (10 mg total) by mouth once daily.    Take 1 tablet (10 mg total) by mouth once daily.    CARVEDILOL (COREG) 6.25 MG TABLET carvediloL (COREG) 6.25 MG tablet       Take 1 tablet (6.25 mg total) by mouth 2 (two) times daily.    Take 1 tablet (6.25 mg total) by mouth 2 (two) times daily.    CITALOPRAM (CELEXA) 20 MG TABLET citalopram (CELEXA) 20 MG tablet       Take 1 tablet (20 mg total) by mouth once daily.    Take 1 tablet by mouth once daily.    LEVOTHYROXINE (SYNTHROID) 75 MCG TABLET levothyroxine  (SYNTHROID) 75 MCG tablet       Take 1 tablet (75 mcg total) by mouth once daily.    Take 1 tablet by mouth once daily.    MECLIZINE (ANTIVERT) 25 MG TABLET meclizine (ANTIVERT) 25 mg tablet       Take 1 tablet (25 mg total) by mouth every 6 (six) hours as needed for Dizziness.    Take 1 tablet (25 mg total) by mouth every 6 (six) hours as needed for Dizziness.    PANTOPRAZOLE (PROTONIX) 40 MG TABLET pantoprazole (PROTONIX) 40 MG tablet       Take 1 tablet (40 mg total) by mouth 2 (two) times daily.    Take 1 tablet (40 mg total) by mouth 2 (two) times daily.    TRAZODONE (DESYREL) 50 MG TABLET traZODone (DESYREL) 50 MG tablet       Take 0.5 tablets (25 mg total) by mouth nightly as needed for Insomnia.    Take 0.5 tablets (25 mg total) by mouth nightly as needed for Insomnia.     Patient Active Problem List   Diagnosis    Diverticulitis    Abdominal hernia    Anxiety    Arthritis    Cataract of both eyes    Chronic back pain    Claustrophobia    Acquired hypothyroidism    Gastroesophageal reflux disease    Hypercholesterolemia    Heart disease    Impaired exercise tolerance    Irregular heart rhythm    Migraine headache    Restless legs syndrome    Seasonal allergic rhinitis    Unstable angina    Weakness    Wears eyeglasses    Brain mass    Type 2 diabetes mellitus without complication, without long-term current use of insulin    Mass of left breast    Adverse effect of glucocorticoid or synthetic analogue    Hypertension    Blurry vision, left eye    Leukocytosis    Pulmonary nodules    Malignant neoplasm of upper-inner quadrant of left breast in female, estrogen receptor positive    Secondary malignant neoplasm of brain    Long term (current) use of aromatase inhibitors    Malignant neoplasm of overlapping sites of left breast in female, estrogen receptor positive     Review of Systems   Constitutional:  Positive for malaise/fatigue.   Gastrointestinal:  Positive for nausea.   Musculoskeletal:  Positive for  "joint pain and myalgias.   Neurological:  Positive for dizziness and weakness.       Objective:      /80   Pulse 95   Resp 12   LMP  (LMP Unknown)   Estimated body mass index is 34.29 kg/m² as calculated from the following:    Height as of 1/25/23: 5' 3" (1.6 m).    Weight as of 1/25/23: 87.8 kg (193 lb 9 oz).  Physical Exam  HENT:      Head: Normocephalic and atraumatic.   Eyes:      Extraocular Movements: Extraocular movements intact.      Conjunctiva/sclera: Conjunctivae normal.   Cardiovascular:      Rate and Rhythm: Normal rate and regular rhythm.      Heart sounds: Normal heart sounds.   Pulmonary:      Effort: Pulmonary effort is normal.      Breath sounds: Normal breath sounds.   Abdominal:      General: Abdomen is flat. Bowel sounds are normal. There is no distension.   Musculoskeletal:         General: No swelling. Normal range of motion.      Cervical back: Normal range of motion.   Skin:     General: Skin is warm and dry.   Neurological:      General: No focal deficit present.      Mental Status: She is alert and oriented to person, place, and time.   Psychiatric:         Mood and Affect: Mood normal.         Behavior: Behavior normal.         Assessment and Plan:   Ms. Candy Oreilly is a 88 y.o. female w/ an active medical problem list including     Type 2 diabetes mellitus without complication, without long-term current use of insulin  -     Hemoglobin A1C; Future; Expected date: 03/07/2023  -     blood-glucose meter kit; Check glucose before meals and before bed  Dispense: 1 each; Refill: 0  -     blood sugar diagnostic Strp; 1 each by Misc.(Non-Drug; Combo Route) route 4 (four) times daily before meals and nightly.  Dispense: 100 each; Refill: 0    Anxiety  -     citalopram (CELEXA) 20 MG tablet; Take 1 tablet (20 mg total) by mouth once daily.  Dispense: 30 tablet; Refill: 6  -     ALPRAZolam (XANAX) 0.5 MG tablet; Take 1 tablet (0.5 mg total) by mouth daily as needed for Anxiety.  " Dispense: 7 tablet; Refill: 0    Primary hypertension  -     carvediloL (COREG) 6.25 MG tablet; Take 1 tablet (6.25 mg total) by mouth 2 (two) times daily.  Dispense: 60 tablet; Refill: 11  -     amLODIPine (NORVASC) 10 MG tablet; Take 1 tablet (10 mg total) by mouth once daily.  Dispense: 30 tablet; Refill: 11    Arthritis    Gastroesophageal reflux disease without esophagitis  -     pantoprazole (PROTONIX) 40 MG tablet; Take 1 tablet (40 mg total) by mouth 2 (two) times daily.    Brain mass  -     traZODone (DESYREL) 50 MG tablet; Take 0.5 tablets (25 mg total) by mouth nightly as needed for Insomnia.  -     meclizine (ANTIVERT) 25 mg tablet; Take 1 tablet (25 mg total) by mouth every 6 (six) hours as needed for Dizziness.; Refill: 0    Acquired hypothyroidism  -     levothyroxine (SYNTHROID) 75 MCG tablet; Take 1 tablet (75 mcg total) by mouth once daily.  Dispense: 30 tablet; Refill: 11  -     TSH; Future; Expected date: 03/07/2023    Malignant neoplasm of upper-inner quadrant of left breast in female, estrogen receptor positive        1. Type 2 diabetes mellitus without complication, without long-term current use of insulin  - Hemoglobin A1C; Future  - blood-glucose meter kit; Check glucose before meals and before bed  Dispense: 1 each; Refill: 0  - blood sugar diagnostic Strp; 1 each by Misc.(Non-Drug; Combo Route) route 4 (four) times daily before meals and nightly.  Dispense: 100 each; Refill: 0    2. Anxiety  - citalopram (CELEXA) 20 MG tablet; Take 1 tablet (20 mg total) by mouth once daily.  Dispense: 30 tablet; Refill: 6  - ALPRAZolam (XANAX) 0.5 MG tablet; Take 1 tablet (0.5 mg total) by mouth daily as needed for Anxiety.  Dispense: 7 tablet; Refill: 0    3. Primary hypertension  - carvediloL (COREG) 6.25 MG tablet; Take 1 tablet (6.25 mg total) by mouth 2 (two) times daily.  Dispense: 60 tablet; Refill: 11  - amLODIPine (NORVASC) 10 MG tablet; Take 1 tablet (10 mg total) by mouth once daily.  Dispense:  30 tablet; Refill: 11    4. Arthritis    5. Gastroesophageal reflux disease without esophagitis  - pantoprazole (PROTONIX) 40 MG tablet; Take 1 tablet (40 mg total) by mouth 2 (two) times daily.    6. Brain mass  - traZODone (DESYREL) 50 MG tablet; Take 0.5 tablets (25 mg total) by mouth nightly as needed for Insomnia.  - meclizine (ANTIVERT) 25 mg tablet; Take 1 tablet (25 mg total) by mouth every 6 (six) hours as needed for Dizziness.; Refill: 0    7. Acquired hypothyroidism  - levothyroxine (SYNTHROID) 75 MCG tablet; Take 1 tablet (75 mcg total) by mouth once daily.  Dispense: 30 tablet; Refill: 11  - TSH; Future    8. Malignant neoplasm of upper-inner quadrant of left breast in female, estrogen receptor positive     Discussed with patient and daughter that patient's medical complexity and need for controlled substances may make her better suited for a staff physician rather than the resident clinic. They voiced understanding. Explained that they are still welcome to see me for other concerns.     Health Maintenance Due   Topic Date Due    Diabetes Urine Screening  Never done    TETANUS VACCINE  Never done    Shingles Vaccine (1 of 2) Never done    Influenza Vaccine (1) 09/01/2022    COVID-19 Vaccine (2 - Pfizer series) 12/28/2022    Hemoglobin A1c  02/25/2023      Follow up:   No follow-ups on file.     Other Orders Placed This Visit:  Orders Placed This Encounter   Procedures    Hemoglobin A1C     Standing Status:   Future     Standing Expiration Date:   5/5/2024    TSH     Standing Status:   Future     Standing Expiration Date:   5/5/2024           Sven Wells M.D.  Internal Medicine PGY-1  [facility]        Preceptor note:    Patient's history and physical discussed, please refer to resident physician's note for specific details. Complex new patient to our team. Billing based off time spent. Recommend staff physician to family given controlled substance history.   I agree with resident's  assessment and plan.      Jonah Guerrero  Staff Physician  [facility]

## 2023-03-08 LAB — TSH SERPL DL<=0.005 MIU/L-ACNC: 0.68 UIU/ML (ref 0.4–4)

## 2023-03-21 ENCOUNTER — HOSPITAL ENCOUNTER (OUTPATIENT)
Dept: RADIOLOGY | Facility: HOSPITAL | Age: 88
Discharge: HOME | End: 2023-03-21
Attending: RADIOLOGY
Payer: MEDICARE

## 2023-03-21 ENCOUNTER — OFFICE VISIT (OUTPATIENT)
Dept: HEMATOLOGY/ONCOLOGY | Facility: CLINIC | Age: 88
End: 2023-03-21
Payer: MEDICARE

## 2023-03-21 VITALS
HEART RATE: 95 BPM | WEIGHT: 175.69 LBS | RESPIRATION RATE: 20 BRPM | HEIGHT: 63 IN | BODY MASS INDEX: 31.13 KG/M2 | DIASTOLIC BLOOD PRESSURE: 67 MMHG | SYSTOLIC BLOOD PRESSURE: 146 MMHG | OXYGEN SATURATION: 97 %

## 2023-03-21 DIAGNOSIS — M25.50 AROMATASE INHIBITOR-ASSOCIATED ARTHRALGIA: ICD-10-CM

## 2023-03-21 DIAGNOSIS — G93.89 BRAIN MASS: ICD-10-CM

## 2023-03-21 DIAGNOSIS — C79.31 SECONDARY MALIGNANT NEOPLASM OF BRAIN: ICD-10-CM

## 2023-03-21 DIAGNOSIS — T45.1X5A AROMATASE INHIBITOR-ASSOCIATED ARTHRALGIA: ICD-10-CM

## 2023-03-21 DIAGNOSIS — Z79.811 LONG TERM (CURRENT) USE OF AROMATASE INHIBITORS: ICD-10-CM

## 2023-03-21 DIAGNOSIS — C50.212 MALIGNANT NEOPLASM OF UPPER-INNER QUADRANT OF LEFT BREAST IN FEMALE, ESTROGEN RECEPTOR POSITIVE: Primary | ICD-10-CM

## 2023-03-21 DIAGNOSIS — F41.9 ANXIETY: ICD-10-CM

## 2023-03-21 DIAGNOSIS — Z17.0 MALIGNANT NEOPLASM OF UPPER-INNER QUADRANT OF LEFT BREAST IN FEMALE, ESTROGEN RECEPTOR POSITIVE: Primary | ICD-10-CM

## 2023-03-21 PROCEDURE — 70553 MRI BRAIN STEM W/O & W/DYE: CPT | Mod: TC

## 2023-03-21 PROCEDURE — 70553 MRI BRAIN STEM W/O & W/DYE: CPT | Mod: 26,,, | Performed by: RADIOLOGY

## 2023-03-21 PROCEDURE — A9585 GADOBUTROL INJECTION: HCPCS | Performed by: RADIOLOGY

## 2023-03-21 PROCEDURE — 70553 MRI BRAIN W WO CONTRAST: ICD-10-PCS | Mod: 26,,, | Performed by: RADIOLOGY

## 2023-03-21 PROCEDURE — 25500020 PHARM REV CODE 255: Performed by: RADIOLOGY

## 2023-03-21 PROCEDURE — 99215 OFFICE O/P EST HI 40 MIN: CPT | Mod: S$PBB,,, | Performed by: INTERNAL MEDICINE

## 2023-03-21 PROCEDURE — 99999 PR PBB SHADOW E&M-EST. PATIENT-LVL II: ICD-10-PCS | Mod: PBBFAC,,, | Performed by: INTERNAL MEDICINE

## 2023-03-21 PROCEDURE — 99212 OFFICE O/P EST SF 10 MIN: CPT | Mod: PBBFAC,25 | Performed by: INTERNAL MEDICINE

## 2023-03-21 PROCEDURE — 99999 PR PBB SHADOW E&M-EST. PATIENT-LVL II: CPT | Mod: PBBFAC,,, | Performed by: INTERNAL MEDICINE

## 2023-03-21 PROCEDURE — 99215 PR OFFICE/OUTPT VISIT, EST, LEVL V, 40-54 MIN: ICD-10-PCS | Mod: S$PBB,,, | Performed by: INTERNAL MEDICINE

## 2023-03-21 RX ORDER — ALPRAZOLAM 0.5 MG/1
0.5 TABLET ORAL DAILY PRN
Qty: 30 TABLET | Refills: 0 | Status: SHIPPED | OUTPATIENT
Start: 2023-03-21 | End: 2023-05-03 | Stop reason: SDUPTHER

## 2023-03-21 RX ORDER — EXEMESTANE 25 MG/1
25 TABLET ORAL DAILY
Qty: 30 TABLET | Refills: 3 | Status: SHIPPED | OUTPATIENT
Start: 2023-03-21 | End: 2023-05-03 | Stop reason: SDUPTHER

## 2023-03-21 RX ORDER — TRAZODONE HYDROCHLORIDE 50 MG/1
25 TABLET ORAL NIGHTLY PRN
Start: 2023-03-21 | End: 2024-03-20

## 2023-03-21 RX ORDER — CALCIUM CARBONATE/VITAMIN D2 250 MG-125
TABLET ORAL
COMMUNITY
Start: 2023-02-13

## 2023-03-21 RX ORDER — HYDROCODONE BITARTRATE AND ACETAMINOPHEN 7.5; 325 MG/1; MG/1
TABLET ORAL
COMMUNITY
Start: 2022-06-16 | End: 2023-03-21 | Stop reason: SDUPTHER

## 2023-03-21 RX ORDER — CETIRIZINE HYDROCHLORIDE 10 MG/1
10 TABLET ORAL NIGHTLY
COMMUNITY
Start: 2022-05-11 | End: 2023-03-31 | Stop reason: SDUPTHER

## 2023-03-21 RX ORDER — GADOBUTROL 604.72 MG/ML
9 INJECTION INTRAVENOUS
Status: COMPLETED | OUTPATIENT
Start: 2023-03-21 | End: 2023-03-21

## 2023-03-21 RX ORDER — HYDROCHLOROTHIAZIDE 25 MG/1
TABLET ORAL
COMMUNITY
Start: 2022-05-11 | End: 2023-07-12 | Stop reason: SDUPTHER

## 2023-03-21 RX ORDER — HYDROCODONE BITARTRATE AND ACETAMINOPHEN 7.5; 325 MG/1; MG/1
1 TABLET ORAL
Qty: 30 TABLET | Refills: 0 | Status: SHIPPED | OUTPATIENT
Start: 2023-03-21 | End: 2023-05-03 | Stop reason: SDUPTHER

## 2023-03-21 RX ORDER — CELECOXIB 200 MG/1
CAPSULE ORAL
COMMUNITY
Start: 2022-05-11 | End: 2023-03-31 | Stop reason: SDUPTHER

## 2023-03-21 RX ADMIN — GADOBUTROL 9 ML: 604.72 INJECTION INTRAVENOUS at 09:03

## 2023-03-21 NOTE — PROGRESS NOTES
"                                                 CONSULT NOTE    Subjective:       Patient ID: Candy Oreilly is a 88 y.o. female.  MRN: [id number]  : 1935    Chief Complaint: Left breast IDC, brain mass     History of Present Illness:   Candy Oreilly is a 88 y.o. female who is referred for Left breast IDC    Ms. Candy Oreilly is an 87 year old woman with CAD, hypothyroidism who presented as a transfer for temporal brain mass,seen on  while inpatient by the oncology team. She initially presented with difficulty with word finding and mild disorientation. She has associated headaches and nausea. She had a MRI brain on  that showed enhancing mass in the medial temporal lobe. When she was admitted, Neurosurgery was consulted. She was started on dexamethasone and keppra with clinical improvement.    Staging work up showed a 2 cm left breast mass, biopsied to be IDC, G2, ER/AK strongly positive, Her 2 sean 2+, FISH negative,      She is a never smoker.   FH:  father with lung, prostate, colon cancer, a mother with thyroid cancer, a sister with breast cancer and an uncle with "multiple" cancers.      At [facility].     Interim history:   She has seen Dr. Lugo who repeated a brain MRI, results pending.   Has completed bone scan and bone density scan.     Still at [facility], had COVID three, still recovering. Has been diagnosed with Diabetes, thought to be related to recent steroid. In a wheel chair today, reports intermittent dizziness     Started anastrozole .   Has now noticed worsening anxiety, arthropathies. Cries easily, on Celexa, xanax and Trazadone.   Does not have a good balance. Walks with a walker but mostly wheel chair bound.   Reports loss of appetite recently as well, has lost an additional 4 lbs.      Oncology History:  22  FINDINGS:  An enhancing mass in the left medial temporal lobe measures 1.5 x 1.9 cm in size, with surrounding edema (series 4, image " 21).  There is no definite additional enhancing mass identified.     There is local mass effect without midline shift or herniation.  There is effacement of the left temporal horn.  There is no midline shift or downward herniation.  The basal cisterns are patent.  There is no abnormal extra-axial fluid collection.  The major vessels enhance normally.  The calvarium and skull base are intact.     Impression:     Enhancing left medial temporal lobe mass with surrounding edema, concerning for malignancy.     11/23/22  Brain MRI     Impression:     Enhancing mass in the medial temporal lobe possibly interventricular.  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     Minimal surrounding vasogenic type edema with no mass effect.     No restricted diffusion to suggest acute infarct.  11/24/22  CT chest abd pelvis  Impression:     1. Soft tissue mass in the left breast.  Malignancy not excluded.  Recommend correlation with physical exam and mammogram.  2. Few solid and ground-glass opacities in the lungs as detailed above measuring up to 0.6 cm.  Clinical and oncologic considerations will determine the role and schedule for continued surveillance.  3. Indeterminate small focus of sclerosis in the anterior C7 vertebral body.  Bone scan could provide further characterization if clinically indicated.  4. 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  5. Additional findings as above.    11/25/22     Impression:  Left  Mass: Left breast 20 mm x 14 mm x 18 mm mass at the 11 o'clock position. Assessment: 4 - Suspicious finding. Biopsy is recommended.      Right  Mammo Digital Diagnostic Bilat with Prince  There is no mammographic evidence of malignancy in the right breast.     US Breast Bilateral Limited  There is no sonographic evidence of malignancy in the right breast.     BI-RADS Category:   Overall: 4 - Suspicious    11/28/22  Final Pathologic Diagnosis Left  breast, ultrasound guided core biopsy:   Invasive ductal carcinoma, moderately differentiated   Lyons grade 2: Tubule formation-3, nuclear pleomorphism-2 and mitotic   count -2   No carcinoma in Situ or lymphovascular invasion is identified   All submitted cores are involved by invasive carcinoma   Tumor biomarkers   Estrogen receptor (ER):  Positive, nearly 100%, strong   Progesterone receptor (PGR):  Positive nearly 100%, strong   HER2 (IHC):  Equivocal, 2+ (will submit for HER2 fish)   Ki-67:  50%   Additional IHC:   AE1/AE3:  Highlights the extent of tumor cells   GATA3:  Strongly, diffusely positive, most compatible with breast   differentiation   CK7:  Strongly positive      Brain MRI 1/17/23     Impression:     Continue though reduced sized enhancing lesion medial left temporal lobe with essential resolution of previous associated edema signal abnormality.This measures 1.0 by 0.3 by 0.4 cm in AP by TV by CC dimensions previously measuring 2.0 x 1.5 x 1.2 cm.       No new lesion or significant new signal abnormality to suggest worsening metastatic disease in light of history     Ventricles relatively stable without hydrocephalus.  No evidence for acute infarction     Clinical correlation and continued follow-up advised    CT chest abd pelvis:  3/2/23  Impression:     Interval decrease in size of left breast soft tissue collection.     Diverticulosis coli.     Subcentimeter splenic hypodensities too small to characterize.     Interval development of a mild right-sided pleural fluid collection.       History:  Past Medical History:   Diagnosis Date    Anxiety disorder, unspecified     Breast cancer     Coronary artery disease     GERD (gastroesophageal reflux disease)     Thyroid disease       Past Surgical History:   Procedure Laterality Date    APPENDECTOMY      BREAST BIOPSY      CHOLECYSTECTOMY       Family History   Problem Relation Age of Onset    Thyroid cancer Mother     Lung cancer Father     Colon  "cancer Father     Breast cancer Sister     Cancer Brother       Social History     Tobacco Use    Smoking status: Never    Smokeless tobacco: Never   Substance and Sexual Activity    Alcohol use: Not on file    Drug use: Not on file    Sexual activity: Not on file        ROS:   Review of Systems   Constitutional:  Negative for fever, malaise/fatigue and weight loss.   HENT:  Negative for congestion, hearing loss, nosebleeds and sore throat.    Eyes:  Negative for double vision and photophobia.   Respiratory:  Negative for cough, hemoptysis, sputum production, shortness of breath and wheezing.    Cardiovascular:  Negative for chest pain, palpitations, orthopnea and leg swelling.   Gastrointestinal:  Negative for abdominal pain, blood in stool, constipation, diarrhea, heartburn, nausea and vomiting.   Genitourinary:  Negative for dysuria, hematuria and urgency.   Musculoskeletal:  Negative for back pain, joint pain and myalgias.   Skin:  Negative for itching and rash.   Neurological:  Positive for dizziness and weakness. Negative for tingling, seizures and headaches.   Endo/Heme/Allergies:  Negative for polydipsia. Does not bruise/bleed easily.   Psychiatric/Behavioral:  Negative for depression and memory loss. The patient is not nervous/anxious and does not have insomnia.       Objective:     Vitals:    03/21/23 1103   BP: (!) 146/67   Pulse: 95   Resp: 20   SpO2: 97%   Weight: 79.7 kg (175 lb 11.3 oz)   Height: 5' 3" (1.6 m)   PainSc:   5   PainLoc: Neck         Physical Examination:   Physical Exam  Vitals and nursing note reviewed.   Constitutional:       General: She is not in acute distress.     Appearance: She is not diaphoretic.   HENT:      Head: Normocephalic.      Mouth/Throat:      Pharynx: No oropharyngeal exudate.   Eyes:      General: No scleral icterus.     Conjunctiva/sclera: Conjunctivae normal.   Neck:      Thyroid: No thyromegaly.   Cardiovascular:      Rate and Rhythm: Normal rate and regular " rhythm.      Heart sounds: Normal heart sounds. No murmur heard.  Pulmonary:      Effort: Pulmonary effort is normal. No respiratory distress.      Breath sounds: No stridor. No wheezing or rales.   Chest:      Chest wall: No tenderness.   Abdominal:      General: Bowel sounds are normal. There is no distension.      Palpations: Abdomen is soft. There is no mass.      Tenderness: There is no abdominal tenderness. There is no rebound.   Musculoskeletal:         General: No tenderness or deformity. Normal range of motion.      Cervical back: Neck supple.   Lymphadenopathy:      Cervical: No cervical adenopathy.   Skin:     General: Skin is warm and dry.      Findings: No erythema or rash.   Neurological:      Mental Status: She is alert and oriented to person, place, and time.      Cranial Nerves: No cranial nerve deficit.      Coordination: Coordination normal.      Gait: Gait is intact.      Comments: Wheel chair bound   Psychiatric:         Mood and Affect: Affect normal.         Cognition and Memory: Memory normal.         Judgment: Judgment normal.        Diagnostic Tests:  Significant Imaging: I have reviewed and interpreted all pertinent imaging results/findings.      Laboratory Data:  All pertinent labs have been reviewed.    Labs:   Lab Results   Component Value Date    WBC 10.10 03/21/2023    HGB 14.6 03/21/2023    HCT 46.0 03/21/2023    MCV 87 03/21/2023     03/21/2023       Assessment/Plan:   Malignant neoplasm of overlapping sites of left breast in female, estrogen receptor positive  yH1L7Sd vs M1 G2 ER/IA strongly positive, Her 2 sean 2+ FISH negative     Patient is 88 years old, previously living independently, recently presented with symptoms secondary to a brain mass in the left medial temporal lobe.  Staging workup showed a left breast mass, biopsy consistent with IDC.    The brain mass  has not been biopsied, presumed to be metastatic from left breast cancer.    She has seen Dr. White who did  not recommend breast surgery given the possibility of metastatic disease to the brain.  I had an extensive discussion with the patient and her family.  We discussed goals of care.  She wishes to pursue  treatments that are appropriate for her and would help prolong her life while maintaining quality of life.    She has started anastrozole and having several issues. Breast imaging and Ct scans are stable. Will try alternative AI with exemestane and reassess in 4-6 weeks.     DEXA scan is normal.   Bone scan did not show any metastatic disease.     Brain MRI results are pending. She will follow up with Dr. Lugo for further recommendations.       Brain mass  As above.    Long term (current) use of aromatase inhibitors  Continue calcium and vitamin-D supplementation while on anastrozole.  Normal bone density scan at baseline.    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  Nodules are  Subcentimeter, no new changes reported.     FH of cancer   Refer to genetics clinic     Diabetes Mellitus   Off steroids, monitor finger sticks.      ECOG SCORE               Discussion:   No follow-ups on file.    Plan was discussed with the patient at length, and she verbalized understanding. Candy was given an opportunity to ask questions that were answered to her satisfaction, and she was advised to call in the interval if any problems or questions arise.    Electronically signed by Hattie Ortez MD      Route Chart for Scheduling    Med Onc Chart Routing      Follow up with physician 6 weeks.   Follow up with [facility]    Infusion scheduling note    Injection scheduling note    Labs    Imaging    Pharmacy appointment    Other referrals

## 2023-03-24 ENCOUNTER — PATIENT MESSAGE (OUTPATIENT)
Dept: RADIATION ONCOLOGY | Facility: CLINIC | Age: 88
End: 2023-03-24
Payer: MEDICARE

## 2023-03-27 ENCOUNTER — TELEPHONE (OUTPATIENT)
Dept: RADIATION ONCOLOGY | Facility: CLINIC | Age: 88
End: 2023-03-27
Payer: MEDICARE

## 2023-03-27 NOTE — TELEPHONE ENCOUNTER
I called Mrs. Oreilly's daughter and reviewed MRI results with her. Since the Left temporal lesion is now growing, I recommend proceeding with single fraction SRS ~22 Gy x1. We will arrange for her to come in this week for simulation with plan to deliver treatment next week.     Plan:  1) Schedule CT Simulation for SRS treatment planning.

## 2023-03-30 ENCOUNTER — HOSPITAL ENCOUNTER (OUTPATIENT)
Dept: RADIATION THERAPY | Facility: HOSPITAL | Age: 88
Discharge: HOME | End: 2023-03-30
Attending: RADIOLOGY
Payer: MEDICARE

## 2023-03-30 PROCEDURE — 77014 HC CT GUIDANCE RADIATION THERAPY FLDS PLACEMENT: CPT | Mod: TC | Performed by: RADIOLOGY

## 2023-03-30 PROCEDURE — 77334 RADIATION TREATMENT AID(S): CPT | Mod: TC | Performed by: RADIOLOGY

## 2023-03-30 PROCEDURE — 77263 PR  RADIATION THERAPY PLAN COMPLEX: ICD-10-PCS | Mod: ,,, | Performed by: RADIOLOGY

## 2023-03-30 PROCEDURE — 77334 PR  RADN TREATMENT AID(S) COMPLX: ICD-10-PCS | Mod: 26,,, | Performed by: RADIOLOGY

## 2023-03-30 PROCEDURE — 77263 THER RADIOLOGY TX PLNG CPLX: CPT | Mod: ,,, | Performed by: RADIOLOGY

## 2023-03-30 PROCEDURE — 77014 PR  CT GUIDANCE PLACEMENT RAD THERAPY FIELDS: CPT | Mod: 26,,, | Performed by: RADIOLOGY

## 2023-03-30 PROCEDURE — 77334 RADIATION TREATMENT AID(S): CPT | Mod: 26,,, | Performed by: RADIOLOGY

## 2023-03-30 PROCEDURE — 77014 PR  CT GUIDANCE PLACEMENT RAD THERAPY FIELDS: ICD-10-PCS | Mod: 26,,, | Performed by: RADIOLOGY

## 2023-03-31 DIAGNOSIS — K21.9 GASTROESOPHAGEAL REFLUX DISEASE WITHOUT ESOPHAGITIS: ICD-10-CM

## 2023-03-31 RX ORDER — PANTOPRAZOLE SODIUM 40 MG/1
40 TABLET, DELAYED RELEASE ORAL 2 TIMES DAILY
Start: 2023-03-31 | End: 2023-04-17 | Stop reason: SDUPTHER

## 2023-03-31 RX ORDER — CETIRIZINE HYDROCHLORIDE 10 MG/1
10 TABLET ORAL NIGHTLY
Qty: 30 TABLET | Refills: 2 | Status: SHIPPED | OUTPATIENT
Start: 2023-03-31 | End: 2023-07-12 | Stop reason: SDUPTHER

## 2023-03-31 RX ORDER — CELECOXIB 200 MG/1
400 CAPSULE ORAL DAILY
Qty: 30 CAPSULE | Refills: 0 | Status: SHIPPED | OUTPATIENT
Start: 2023-03-31 | End: 2023-05-03 | Stop reason: SDUPTHER

## 2023-03-31 RX ORDER — LEVETIRACETAM 500 MG/1
500 TABLET ORAL 2 TIMES DAILY
Qty: 60 TABLET | Refills: 0 | OUTPATIENT
Start: 2023-03-31 | End: 2023-04-04 | Stop reason: SDUPTHER

## 2023-03-31 NOTE — TELEPHONE ENCOUNTER
----- Message from Karson Garcia sent at 3/31/2023 12:48 PM CDT -----  Contact: patient daughter  Type:  Patient Call          Who Called: patient daughter         Does the patient know what this is regarding?: Requesting a call back to discuss refills on Rx celecoxib (CELEBREX) 200 MG capsule  , pantoprazole (PROTONIX) 40 MG tablet and cetirizine (ZYRTEC) 10 MG tablet and  to discuss Rx levETIRAcetam (KEPPRA) 500 MG Tab if her mom should continue taking medication  ; please advise           Would the patient rather a call back or a response via [facility]?call          Best Call Back Number:[phone]            Additional Information:      [id number] [place] [address] [address] [facility] [facility]  [address]  [facility] [address]  Phone: [phone] Fax: [phone]

## 2023-04-03 ENCOUNTER — HOSPITAL ENCOUNTER (OUTPATIENT)
Dept: RADIATION THERAPY | Facility: HOSPITAL | Age: 88
Discharge: HOME | End: 2023-04-03
Attending: RADIOLOGY
Payer: MEDICARE

## 2023-04-03 PROCEDURE — 77301 RADIOTHERAPY DOSE PLAN IMRT: CPT | Mod: TC | Performed by: RADIOLOGY

## 2023-04-03 PROCEDURE — 77301 RADIOTHERAPY DOSE PLAN IMRT: CPT | Mod: 26,,, | Performed by: RADIOLOGY

## 2023-04-03 PROCEDURE — 77301 PR  INTEN MOD RADIOTHER PLAN W/DOSE VOL HIST: ICD-10-PCS | Mod: 26,,, | Performed by: RADIOLOGY

## 2023-04-04 DIAGNOSIS — K21.9 GASTROESOPHAGEAL REFLUX DISEASE WITHOUT ESOPHAGITIS: ICD-10-CM

## 2023-04-04 PROCEDURE — 77300 PR RADIATION THERAPY,DOSIMETRY PLAN: ICD-10-PCS | Mod: 26,,, | Performed by: RADIOLOGY

## 2023-04-04 PROCEDURE — 77300 RADIATION THERAPY DOSE PLAN: CPT | Mod: 26,,, | Performed by: RADIOLOGY

## 2023-04-04 PROCEDURE — 77300 RADIATION THERAPY DOSE PLAN: CPT | Mod: TC | Performed by: RADIOLOGY

## 2023-04-04 RX ORDER — LEVETIRACETAM 500 MG/1
500 TABLET ORAL 2 TIMES DAILY
Qty: 60 TABLET | Refills: 0 | OUTPATIENT
Start: 2023-04-04 | End: 2023-04-06 | Stop reason: SDUPTHER

## 2023-04-04 NOTE — TELEPHONE ENCOUNTER
----- Message from Kaci Hooper sent at 4/4/2023  2:54 PM CDT -----  Regarding: Prescription  Contact: Tyesha/daughter [phone]  Calling to speak with provider or nurse regarding Rx levETIRAcetam (KEPPRA) 500 MG Tab and if patient should still be taking medication. Please send a new prescription to [facility] if patient should continue.    [id number] - [place], [place] - [address] [address] [facility] [address]  [address] [facility]  [place] [address]  Phone: [phone] Fax: [phone]

## 2023-04-05 PROCEDURE — 77470 SPECIAL RADIATION TREATMENT: CPT | Mod: 26,59,, | Performed by: RADIOLOGY

## 2023-04-05 PROCEDURE — 77338 PR  MLC IMRT DESIGN & CONSTRUCTION PER IMRT PLAN: ICD-10-PCS | Mod: 26,,, | Performed by: RADIOLOGY

## 2023-04-05 PROCEDURE — 77338 DESIGN MLC DEVICE FOR IMRT: CPT | Mod: TC | Performed by: RADIOLOGY

## 2023-04-05 PROCEDURE — 77370 RADIATION PHYSICS CONSULT: CPT | Performed by: RADIOLOGY

## 2023-04-05 PROCEDURE — 77470 SPECIAL RADIATION TREATMENT: CPT | Mod: 59,TC | Performed by: RADIOLOGY

## 2023-04-05 PROCEDURE — 77338 DESIGN MLC DEVICE FOR IMRT: CPT | Mod: 26,,, | Performed by: RADIOLOGY

## 2023-04-05 PROCEDURE — 77470 PR  SPECIAL RADIATION TREATMENT: ICD-10-PCS | Mod: 26,59,, | Performed by: RADIOLOGY

## 2023-04-06 DIAGNOSIS — K21.9 GASTROESOPHAGEAL REFLUX DISEASE WITHOUT ESOPHAGITIS: ICD-10-CM

## 2023-04-06 DIAGNOSIS — C79.31 SECONDARY MALIGNANT NEOPLASM OF BRAIN: Primary | ICD-10-CM

## 2023-04-06 PROCEDURE — 77014 PR  CT GUIDANCE PLACEMENT RAD THERAPY FIELDS: CPT | Mod: 26,,, | Performed by: RADIOLOGY

## 2023-04-06 PROCEDURE — 77372 SRS LINEAR BASED: CPT | Performed by: RADIOLOGY

## 2023-04-06 PROCEDURE — 77014 HC CT GUIDANCE RADIATION THERAPY FLDS PLACEMENT: CPT | Mod: TC | Performed by: RADIOLOGY

## 2023-04-06 PROCEDURE — 77014 PR  CT GUIDANCE PLACEMENT RAD THERAPY FIELDS: ICD-10-PCS | Mod: 26,,, | Performed by: RADIOLOGY

## 2023-04-06 RX ORDER — LEVETIRACETAM 500 MG/1
500 TABLET ORAL 2 TIMES DAILY
Qty: 60 TABLET | Refills: 0 | Status: SHIPPED | OUTPATIENT
Start: 2023-04-06 | End: 2023-04-10

## 2023-04-08 PROCEDURE — 77336 RADIATION PHYSICS CONSULT: CPT | Performed by: RADIOLOGY

## 2023-04-17 DIAGNOSIS — K21.9 GASTROESOPHAGEAL REFLUX DISEASE WITHOUT ESOPHAGITIS: ICD-10-CM

## 2023-04-18 RX ORDER — PANTOPRAZOLE SODIUM 40 MG/1
40 TABLET, DELAYED RELEASE ORAL 2 TIMES DAILY
Qty: 60 TABLET | Refills: 0 | Status: SHIPPED | OUTPATIENT
Start: 2023-04-18 | End: 2023-04-21 | Stop reason: SDUPTHER

## 2023-04-18 RX ORDER — PANTOPRAZOLE SODIUM 40 MG/1
40 TABLET, DELAYED RELEASE ORAL 2 TIMES DAILY
Qty: 60 TABLET | Refills: 2 | Status: CANCELLED | OUTPATIENT
Start: 2023-04-18 | End: 2023-05-18

## 2023-04-18 NOTE — TELEPHONE ENCOUNTER
----- Message from Parisa Starr sent at 4/18/2023  1:37 PM CDT -----  Contact: Senior erendira Koch/ Audrey [phone]  Pharmacy is calling to clarify an RX.  RX name:  pantoprazole (PROTONIX) 40 MG tablet  What do they need to clarify:  This pharmacy only fill meds for patient's residing in a nursing home. Please send it to a different pharmacy if she is nolonger in the nursing home. Call back to let the pharmay know if the patient is, in fact, in a nursing home so they can fill it.

## 2023-04-21 DIAGNOSIS — K21.9 GASTROESOPHAGEAL REFLUX DISEASE WITHOUT ESOPHAGITIS: ICD-10-CM

## 2023-04-21 RX ORDER — PANTOPRAZOLE SODIUM 40 MG/1
40 TABLET, DELAYED RELEASE ORAL 2 TIMES DAILY
Qty: 60 TABLET | Refills: 0 | Status: SHIPPED | OUTPATIENT
Start: 2023-04-21 | End: 2023-07-12 | Stop reason: SDUPTHER

## 2023-04-21 NOTE — TELEPHONE ENCOUNTER
----- Message from Jose Alatorre sent at 4/21/2023  2:55 PM CDT -----  Regarding: medication  Contact: @[phone]  Pt daughter calling in regards to pantoprazole (PROTONIX) 40 MG tablet wants it resent drake in [facility].. pls call and @[phone]

## 2023-04-25 ENCOUNTER — TELEPHONE (OUTPATIENT)
Dept: RADIATION ONCOLOGY | Facility: CLINIC | Age: 88
End: 2023-04-25
Payer: MEDICARE

## 2023-04-25 NOTE — TELEPHONE ENCOUNTER
Followup call after completing radiation to the brain  Her dtr states she is doing well  f/u appt and scan confirmed

## 2023-04-25 NOTE — TELEPHONE ENCOUNTER
----- Message from Jessica Shore RN sent at 4/6/2023  2:44 PM CDT -----  Srs brain c 6 CS f/u made

## 2023-04-28 DIAGNOSIS — K21.9 GASTROESOPHAGEAL REFLUX DISEASE WITHOUT ESOPHAGITIS: ICD-10-CM

## 2023-04-28 RX ORDER — CELECOXIB 200 MG/1
400 CAPSULE ORAL DAILY
Qty: 30 CAPSULE | Refills: 0 | Status: CANCELLED | OUTPATIENT
Start: 2023-04-28

## 2023-04-28 NOTE — TELEPHONE ENCOUNTER
----- Message from Odalis Aguillon sent at 4/28/2023  4:38 PM CDT -----  Contact: [phone]  Requesting an RX refill or new RX. refill  Is this a refill or new RX: refill    RX name and strength (copy/paste from chart):  celecoxib (CELEBREX) 200 MG capsule 30 capsule     Is this a 30 day or 90 day RX: 30    Pharmacy name and phone # (copy/paste from chart):      [id number] - [place], [place] - [address] [facility] [address]  [address]  [facility] [address]  Phone: [phone] Fax: [phone]

## 2023-05-03 ENCOUNTER — OFFICE VISIT (OUTPATIENT)
Dept: HEMATOLOGY/ONCOLOGY | Facility: CLINIC | Age: 88
End: 2023-05-03
Payer: MEDICARE

## 2023-05-03 VITALS
DIASTOLIC BLOOD PRESSURE: 76 MMHG | OXYGEN SATURATION: 95 % | HEART RATE: 110 BPM | WEIGHT: 166 LBS | SYSTOLIC BLOOD PRESSURE: 139 MMHG | HEIGHT: 63 IN | TEMPERATURE: 97 F | RESPIRATION RATE: 17 BRPM | BODY MASS INDEX: 29.41 KG/M2

## 2023-05-03 DIAGNOSIS — Z17.0 MALIGNANT NEOPLASM OF UPPER-INNER QUADRANT OF LEFT BREAST IN FEMALE, ESTROGEN RECEPTOR POSITIVE: Primary | ICD-10-CM

## 2023-05-03 DIAGNOSIS — Z79.811 LONG TERM (CURRENT) USE OF AROMATASE INHIBITORS: ICD-10-CM

## 2023-05-03 DIAGNOSIS — C79.31 SECONDARY MALIGNANT NEOPLASM OF BRAIN: ICD-10-CM

## 2023-05-03 DIAGNOSIS — G93.89 BRAIN MASS: ICD-10-CM

## 2023-05-03 DIAGNOSIS — K21.9 GASTROESOPHAGEAL REFLUX DISEASE WITHOUT ESOPHAGITIS: ICD-10-CM

## 2023-05-03 DIAGNOSIS — M25.50 AROMATASE INHIBITOR-ASSOCIATED ARTHRALGIA: ICD-10-CM

## 2023-05-03 DIAGNOSIS — F41.9 ANXIETY: ICD-10-CM

## 2023-05-03 DIAGNOSIS — T45.1X5A AROMATASE INHIBITOR-ASSOCIATED ARTHRALGIA: ICD-10-CM

## 2023-05-03 DIAGNOSIS — C50.212 MALIGNANT NEOPLASM OF UPPER-INNER QUADRANT OF LEFT BREAST IN FEMALE, ESTROGEN RECEPTOR POSITIVE: Primary | ICD-10-CM

## 2023-05-03 DIAGNOSIS — E03.9 ACQUIRED HYPOTHYROIDISM: ICD-10-CM

## 2023-05-03 PROCEDURE — 99214 PR OFFICE/OUTPT VISIT, EST, LEVL IV, 30-39 MIN: ICD-10-PCS | Mod: S$PBB,,, | Performed by: INTERNAL MEDICINE

## 2023-05-03 PROCEDURE — 99215 OFFICE O/P EST HI 40 MIN: CPT | Mod: PBBFAC | Performed by: INTERNAL MEDICINE

## 2023-05-03 PROCEDURE — 99999 PR PBB SHADOW E&M-EST. PATIENT-LVL V: CPT | Mod: PBBFAC,,, | Performed by: INTERNAL MEDICINE

## 2023-05-03 PROCEDURE — 99214 OFFICE O/P EST MOD 30 MIN: CPT | Mod: S$PBB,,, | Performed by: INTERNAL MEDICINE

## 2023-05-03 PROCEDURE — 99999 PR PBB SHADOW E&M-EST. PATIENT-LVL V: ICD-10-PCS | Mod: PBBFAC,,, | Performed by: INTERNAL MEDICINE

## 2023-05-03 RX ORDER — CELECOXIB 200 MG/1
400 CAPSULE ORAL DAILY
Qty: 30 CAPSULE | Refills: 0 | Status: SHIPPED | OUTPATIENT
Start: 2023-05-03 | End: 2023-05-17

## 2023-05-03 RX ORDER — ALPRAZOLAM 0.5 MG/1
0.5 TABLET ORAL DAILY PRN
Qty: 30 TABLET | Refills: 0 | Status: SHIPPED | OUTPATIENT
Start: 2023-05-03 | End: 2023-05-31 | Stop reason: SDUPTHER

## 2023-05-03 RX ORDER — HYDROCODONE BITARTRATE AND ACETAMINOPHEN 7.5; 325 MG/1; MG/1
1 TABLET ORAL
Qty: 30 TABLET | Refills: 0 | Status: SHIPPED | OUTPATIENT
Start: 2023-05-03 | End: 2023-05-31

## 2023-05-03 RX ORDER — LEVOTHYROXINE SODIUM 75 UG/1
75 TABLET ORAL DAILY
Qty: 90 TABLET | Refills: 3 | Status: SHIPPED | OUTPATIENT
Start: 2023-05-03 | End: 2023-07-12 | Stop reason: SDUPTHER

## 2023-05-03 RX ORDER — EXEMESTANE 25 MG/1
25 TABLET ORAL DAILY
Qty: 90 TABLET | Refills: 3 | Status: SHIPPED | OUTPATIENT
Start: 2023-05-03 | End: 2023-07-12 | Stop reason: SDUPTHER

## 2023-05-03 NOTE — PROGRESS NOTES
"                                                 CONSULT NOTE    Subjective:       Patient ID: Candy Oreilly is a 88 y.o. female.  MRN: [id number]  : 1935    Chief Complaint: Left breast IDC, brain mass     History of Present Illness:   Candy Oreilly is a 88 y.o. female who is referred for Left breast IDC    Ms. Candy Oreilly is an 87 year old woman with CAD, hypothyroidism who presented as a transfer for temporal brain mass,seen on  while inpatient by the oncology team. She initially presented with difficulty with word finding and mild disorientation. She has associated headaches and nausea. She had a MRI brain on  that showed enhancing mass in the medial temporal lobe. When she was admitted, Neurosurgery was consulted. She was started on dexamethasone and keppra with clinical improvement.    Staging work up showed a 2 cm left breast mass, biopsied to be IDC, G2, ER/GA strongly positive, Her 2 sean 2+, FISH negative,      She is a never smoker.   FH:  father with lung, prostate, colon cancer, a mother with thyroid cancer, a sister with breast cancer and an uncle with "multiple" cancers.    Started anastrozole .   Has now noticed worsening anxiety, arthropathies. Cries easily, on Celexa, xanax and Trazadone.   Does not have a good balance. Walks with a walker but mostly wheel chair bound.   Reports loss of appetite recently as well, has lost an additional 4 lbs.     Interim history:   On , she underwent SRS to the left temporal lesion as it was enlarging. Generally feels better as she has been discharged from [facility] and has moved in with her daughter.     Had a rough morning, with generalized pain, nausea, vomiting, neck pain, took 2 Celebrex with no significant improvement.     Anastrozole was stopped due to multiple similar symptoms, she has been on exemestane for the past 6 weeks and feels that symptoms are intermittent now, mostly c/o joint pains.         Oncology " History:  11/21/22  FINDINGS:  An enhancing mass in the left medial temporal lobe measures 1.5 x 1.9 cm in size, with surrounding edema (series 4, image 21).  There is no definite additional enhancing mass identified.     There is local mass effect without midline shift or herniation.  There is effacement of the left temporal horn.  There is no midline shift or downward herniation.  The basal cisterns are patent.  There is no abnormal extra-axial fluid collection.  The major vessels enhance normally.  The calvarium and skull base are intact.     Impression:     Enhancing left medial temporal lobe mass with surrounding edema, concerning for malignancy.     11/23/22  Brain MRI     Impression:     Enhancing mass in the medial temporal lobe possibly interventricular.  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     Minimal surrounding vasogenic type edema with no mass effect.     No restricted diffusion to suggest acute infarct.  11/24/22  CT chest abd pelvis  Impression:     1. Soft tissue mass in the left breast.  Malignancy not excluded.  Recommend correlation with physical exam and mammogram.  2. Few solid and ground-glass opacities in the lungs as detailed above measuring up to 0.6 cm.  Clinical and oncologic considerations will determine the role and schedule for continued surveillance.  3. Indeterminate small focus of sclerosis in the anterior C7 vertebral body.  Bone scan could provide further characterization if clinically indicated.  4. 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  5. Additional findings as above.    11/25/22     Impression:  Left  Mass: Left breast 20 mm x 14 mm x 18 mm mass at the 11 o'clock position. Assessment: 4 - Suspicious finding. Biopsy is recommended.      Right  Mammo Digital Diagnostic Bilat with [facility]  There is no mammographic evidence of malignancy in the right breast.     US Breast Bilateral Limited  There is no  sonographic evidence of malignancy in the right breast.     BI-RADS Category:   Overall: 4 - Suspicious    11/28/22  Final Pathologic Diagnosis Left breast, ultrasound guided core biopsy:   Invasive ductal carcinoma, moderately differentiated   [place] grade 2: Tubule formation-3, nuclear pleomorphism-2 and mitotic   count -2   No carcinoma in Situ or lymphovascular invasion is identified   All submitted cores are involved by invasive carcinoma   Tumor biomarkers   Estrogen receptor (ER):  Positive, nearly 100%, strong   Progesterone receptor (PGR):  Positive nearly 100%, strong   HER2 (IHC):  Equivocal, 2+ (will submit for HER2 fish)   Ki-67:  50%   Additional IHC:   AE1/AE3:  Highlights the extent of tumor cells   GATA3:  Strongly, diffusely positive, most compatible with breast   differentiation   CK7:  Strongly positive      Brain MRI 1/17/23     Impression:     Continue though reduced sized enhancing lesion medial left temporal lobe with essential resolution of previous associated edema signal abnormality.This measures 1.0 by 0.3 by 0.4 cm in AP by TV by CC dimensions previously measuring 2.0 x 1.5 x 1.2 cm.       No new lesion or significant new signal abnormality to suggest worsening metastatic disease in light of history     Ventricles relatively stable without hydrocephalus.  No evidence for acute infarction     Clinical correlation and continued follow-up advised    CT chest abd pelvis:  3/2/23  Impression:     Interval decrease in size of left breast soft tissue collection.     Diverticulosis coli.     Subcentimeter splenic hypodensities too small to characterize.     Interval development of a mild right-sided pleural fluid collection.       History:  Past Medical History:   Diagnosis Date    Anxiety disorder, unspecified     Breast cancer     Coronary artery disease     GERD (gastroesophageal reflux disease)     Thyroid disease       Past Surgical History:   Procedure Laterality Date    APPENDECTOMY    "   BREAST BIOPSY      CHOLECYSTECTOMY       Family History   Problem Relation Age of Onset    Thyroid cancer Mother     Lung cancer Father     Colon cancer Father     Breast cancer Sister     Cancer Brother       Social History     Tobacco Use    Smoking status: Never    Smokeless tobacco: Never   Substance and Sexual Activity    Alcohol use: Not on file    Drug use: Not on file    Sexual activity: Not on file        ROS:   Review of Systems   Constitutional:  Negative for fever, malaise/fatigue and weight loss.   HENT:  Negative for congestion, hearing loss, nosebleeds and sore throat.    Eyes:  Negative for double vision and photophobia.   Respiratory:  Negative for cough, hemoptysis, sputum production, shortness of breath and wheezing.    Cardiovascular:  Negative for chest pain, palpitations, orthopnea and leg swelling.   Gastrointestinal:  Negative for abdominal pain, blood in stool, constipation, diarrhea, heartburn, nausea and vomiting.   Genitourinary:  Negative for dysuria, hematuria and urgency.   Musculoskeletal:  Negative for back pain, joint pain and myalgias.   Skin:  Negative for itching and rash.   Neurological:  Positive for dizziness and weakness. Negative for tingling, seizures and headaches.   Endo/Heme/Allergies:  Negative for polydipsia. Does not bruise/bleed easily.   Psychiatric/Behavioral:  Negative for depression and memory loss. The patient is not nervous/anxious and does not have insomnia.       Objective:     Vitals:    05/03/23 1439   BP: 139/76   Pulse: 110   Resp: 17   Temp: 97.1 °F (36.2 °C)   TempSrc: Oral   SpO2: 95%   Weight: 75.3 kg (166 lb 0.1 oz)   Height: 5' 3" (1.6 m)   PainSc:   8   PainLoc: Shoulder         Physical Examination:   Physical Exam  Vitals and nursing note reviewed.   Constitutional:       General: She is not in acute distress.     Appearance: She is not diaphoretic.   HENT:      Head: Normocephalic.      Mouth/Throat:      Pharynx: No oropharyngeal exudate. "   Eyes:      General: No scleral icterus.     Conjunctiva/sclera: Conjunctivae normal.   Neck:      Thyroid: No thyromegaly.   Cardiovascular:      Rate and Rhythm: Normal rate and regular rhythm.      Heart sounds: Normal heart sounds. No murmur heard.  Pulmonary:      Effort: Pulmonary effort is normal. No respiratory distress.      Breath sounds: No stridor. No wheezing or rales.   Chest:      Chest wall: No tenderness.   Abdominal:      General: Bowel sounds are normal. There is no distension.      Palpations: Abdomen is soft. There is no mass.      Tenderness: There is no abdominal tenderness. There is no rebound.   Musculoskeletal:         General: No tenderness or deformity. Normal range of motion.      Cervical back: Neck supple.   Lymphadenopathy:      Cervical: No cervical adenopathy.   Skin:     General: Skin is warm and dry.      Findings: No erythema or rash.   Neurological:      Mental Status: She is alert and oriented to person, place, and time.      Cranial Nerves: No cranial nerve deficit.      Coordination: Coordination normal.      Gait: Gait is intact.      Comments: Ambulating to the wheel chair in the hallway   Psychiatric:         Mood and Affect: Affect normal.         Cognition and Memory: Memory normal.         Judgment: Judgment normal.        Diagnostic Tests:  Significant Imaging: I have reviewed and interpreted all pertinent imaging results/findings.      Laboratory Data:  All pertinent labs have been reviewed.    Labs:   Lab Results   Component Value Date    WBC 10.10 03/21/2023    HGB 14.6 03/21/2023    HCT 46.0 03/21/2023    MCV 87 03/21/2023     03/21/2023       Assessment/Plan:   Malignant neoplasm of overlapping sites of left breast in female, estrogen receptor positive  [id number] vs M1 G2 ER/MA strongly positive, Her 2 sean 2+ FISH negative     Patient is 88 years old, previously living independently, recently presented with symptoms secondary to a brain mass in the left  medial temporal lobe.  Staging workup showed a left breast mass, biopsy consistent with IDC.    The brain mass  has not been biopsied, presumed to be metastatic from left breast cancer.    She has seen Dr. White who did not recommend breast surgery given the possibility of metastatic disease to the brain.  I had an extensive discussion with the patient and her family.  We discussed goals of care.  She wishes to pursue  treatments that are appropriate for her and would help prolong her life while maintaining quality of life.    She has started anastrozole and having several issues. Breast imaging and Ct scans are stable. Switched to exemestane, she is willing to continue.Will see her back in July with breast imaging. Full CT every 6 months or as clinically indicated.  Refer to palliative care for continued GOC discussions and for pain management.     DEXA scan is normal.   Bone scan did not show any metastatic disease.     Secondary brain metastasis  Lesion was enlarging on follow up MRI in March. Discussed with Dr. Lugo, she is now s/p SRS. Plan to repeat MRI in July.     Long term (current) use of aromatase inhibitors  Continue calcium and vitamin-D supplementation while on anastrozole.  Normal bone density scan at baseline.    Pain and anxiety   Refilled Norco and xanax. Referred to palliative care.     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  Nodules are  subcentimeter, no new changes reported.     Diabetes Mellitus   Off steroids, monitor finger sticks.     Hypothyroidism   Refilled levothyroxine      ECOG SCORE    2 - Capable of all selfcare but unable to carry out any work activities, active > 50% of hours           Discussion:   No follow-ups on file.    Plan was discussed with the patient at length, and she verbalized understanding. Candy was given an opportunity to ask questions that were answered to her satisfaction, and she was advised to call in the interval if any  problems or questions arise.    Electronically signed by Hattie Ortez MD      Route Chart for Scheduling    Med Onc Chart Routing      Follow up with physician . 7/12/23 2:30 pm. Refer to palliative care.   Follow up with [facility]    Infusion scheduling note    Injection scheduling note    Labs    Imaging Mammogram   left mammogram and US 1-2 days before my visit at [facility].   Pharmacy appointment    Other referrals

## 2023-05-16 DIAGNOSIS — K21.9 GASTROESOPHAGEAL REFLUX DISEASE WITHOUT ESOPHAGITIS: ICD-10-CM

## 2023-05-17 ENCOUNTER — TELEPHONE (OUTPATIENT)
Dept: HEMATOLOGY/ONCOLOGY | Facility: CLINIC | Age: 88
End: 2023-05-17
Payer: MEDICARE

## 2023-05-17 RX ORDER — CELECOXIB 200 MG/1
CAPSULE ORAL
Qty: 30 CAPSULE | Refills: 0 | Status: SHIPPED | OUTPATIENT
Start: 2023-05-17 | End: 2023-06-16 | Stop reason: SDUPTHER

## 2023-05-17 NOTE — TELEPHONE ENCOUNTER
----- Message from Beverly Prado RN sent at 5/17/2023 12:43 PM CDT -----  Regarding: FW: Oncology CC'd Chart  Ricardo,  I scheduled her f/u and I see she has a mammo scheduled, can you help to schedule the breast ultrasound? Thank you    Beverly   ----- Message -----  From: Hattie Ortez MD  Sent: 5/3/2023   2:54 PM CDT  To: [facility]  Pool  Subject: Oncology CC'd Chart

## 2023-05-30 ENCOUNTER — TELEPHONE (OUTPATIENT)
Dept: PALLIATIVE MEDICINE | Facility: CLINIC | Age: 88
End: 2023-05-30
Payer: MEDICARE

## 2023-05-31 ENCOUNTER — OFFICE VISIT (OUTPATIENT)
Dept: PALLIATIVE MEDICINE | Facility: CLINIC | Age: 88
End: 2023-05-31
Payer: MEDICARE

## 2023-05-31 DIAGNOSIS — M25.50 AROMATASE INHIBITOR-ASSOCIATED ARTHRALGIA: ICD-10-CM

## 2023-05-31 DIAGNOSIS — G47.00 INSOMNIA, UNSPECIFIED TYPE: ICD-10-CM

## 2023-05-31 DIAGNOSIS — C50.212 MALIGNANT NEOPLASM OF UPPER-INNER QUADRANT OF LEFT BREAST IN FEMALE, ESTROGEN RECEPTOR POSITIVE: ICD-10-CM

## 2023-05-31 DIAGNOSIS — Z71.89 ACP (ADVANCE CARE PLANNING): ICD-10-CM

## 2023-05-31 DIAGNOSIS — R53.83 FATIGUE, UNSPECIFIED TYPE: ICD-10-CM

## 2023-05-31 DIAGNOSIS — Z17.0 MALIGNANT NEOPLASM OF UPPER-INNER QUADRANT OF LEFT BREAST IN FEMALE, ESTROGEN RECEPTOR POSITIVE: ICD-10-CM

## 2023-05-31 DIAGNOSIS — G89.3 CANCER RELATED PAIN: Primary | ICD-10-CM

## 2023-05-31 DIAGNOSIS — F41.9 ANXIETY: ICD-10-CM

## 2023-05-31 DIAGNOSIS — Z51.5 ENCOUNTER FOR PALLIATIVE CARE: ICD-10-CM

## 2023-05-31 DIAGNOSIS — G93.89 BRAIN MASS: ICD-10-CM

## 2023-05-31 DIAGNOSIS — K21.9 GASTROESOPHAGEAL REFLUX DISEASE WITHOUT ESOPHAGITIS: ICD-10-CM

## 2023-05-31 DIAGNOSIS — F43.20 ADJUSTMENT DISORDER, UNSPECIFIED TYPE: ICD-10-CM

## 2023-05-31 DIAGNOSIS — T45.1X5A AROMATASE INHIBITOR-ASSOCIATED ARTHRALGIA: ICD-10-CM

## 2023-05-31 PROCEDURE — 99999 PR PBB SHADOW E&M-EST. PATIENT-LVL III: ICD-10-PCS | Mod: PBBFAC,,, | Performed by: NURSE PRACTITIONER

## 2023-05-31 PROCEDURE — 99205 OFFICE O/P NEW HI 60 MIN: CPT | Mod: S$PBB,,, | Performed by: NURSE PRACTITIONER

## 2023-05-31 PROCEDURE — 99497 PR ADVNCD CARE PLAN 30 MIN: ICD-10-PCS | Mod: S$PBB,,, | Performed by: NURSE PRACTITIONER

## 2023-05-31 PROCEDURE — 99497 ADVNCD CARE PLAN 30 MIN: CPT | Mod: S$PBB,,, | Performed by: NURSE PRACTITIONER

## 2023-05-31 PROCEDURE — 99213 OFFICE O/P EST LOW 20 MIN: CPT | Mod: PBBFAC | Performed by: NURSE PRACTITIONER

## 2023-05-31 PROCEDURE — 99999 PR PBB SHADOW E&M-EST. PATIENT-LVL III: CPT | Mod: PBBFAC,,, | Performed by: NURSE PRACTITIONER

## 2023-05-31 PROCEDURE — 99205 PR OFFICE/OUTPT VISIT, NEW, LEVL V, 60-74 MIN: ICD-10-PCS | Mod: S$PBB,,, | Performed by: NURSE PRACTITIONER

## 2023-05-31 RX ORDER — ALPRAZOLAM 0.5 MG/1
0.5 TABLET ORAL 2 TIMES DAILY PRN
Qty: 30 TABLET | Refills: 0 | Status: SHIPPED | OUTPATIENT
Start: 2023-05-31 | End: 2023-07-12 | Stop reason: SDUPTHER

## 2023-05-31 RX ORDER — HYDROCODONE BITARTRATE AND ACETAMINOPHEN 7.5; 325 MG/1; MG/1
1 TABLET ORAL EVERY 8 HOURS PRN
Qty: 30 TABLET | Refills: 0 | Status: SHIPPED | OUTPATIENT
Start: 2023-05-31 | End: 2023-07-12 | Stop reason: SDUPTHER

## 2023-05-31 RX ORDER — MECLIZINE HYDROCHLORIDE 25 MG/1
25 TABLET ORAL EVERY 6 HOURS PRN
Refills: 0
Start: 2023-05-31

## 2023-05-31 NOTE — PROGRESS NOTES
"  Consult Note  Palliative Care      Consult Requested By: Dr. Hattie Ortez  Reason for Consult: symptom management/ACP      ASSESSMENT/PLAN:     Plan/Recommendations:    06/02/2023:  - initial appointment  - no changes made today    Malignant neoplasm of upper-inner quadrant of left breast in female, estrogen receptor positive/brain mass  - following with Dr. Ortez  - did not tolerate anastrozole   - currently on exemestane      Encounter for palliative care  - Patient is decisional  - Patient accompanied today by jo ann Arambula  - ACP documents are not uploaded into EMR   - Philosophy of Palliative Medicine reviewed with patient and family at first visit  - New patient folder given to and reviewed with patient and family at first visit  - Goals of care: To preserve/regain independence, quality of life. Struggling with many challenging symptoms including vertigo making it difficult to ambulate, high emotionality, tremors and pain.      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  - patient extremely tearful throughout appointment, says she cries easily "about nothing", says prior to cancer medication she was a very happy person, she does say feels relief after crying episodes   - grieving loss of independence, 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, difficult transition to dependence on daughter and others for her care  - Support system: Lives with jo ann Arambula, has a son who lives " in [place]   - discussed at length the potential benefit of talking through complex feelings and grief with a counselor, she was resistant to this idea but agreed to consider it   - reports high anxiety associated with the process of selling her home, as well as the number of providers she is seeing, she says she believed pall care was going to take over all of her care, she is tearful when we explain our role is limited to symptom management, support and goal discussion  - currently on celexa 20 mg daily   - currently on xanex 0.5 mg BID daily PRN, encouraged to use as need  - emotional support provided  - pall care SW joined visit, pls see separate note for full assessment   - will continue to monitor     Insomnia/fatigue   - severe fatigue, feels tired all of the time  - difficulty sleeping at night, though uses xanax at times, which helps  - tip sheet provided in new patient folder  - will continue to monitor     Understanding of illness/Prognosis: good    Goals of care: quality of life, regaining independence, functionality     Follow up: 6 weeks    Patient's encounter and above plan of care discussed with patient's oncology team.     SUBJECTIVE:     History of Present Illness:  Patient is a 88 y.o. year old female presenting with malignant neoplasm of upper-inner quadrant of left breast in female, estrogen receptor positive and brain mass. Please see oncology notes for full oncologic history and treatment course.     5/31/2023:  ERLINDA THACKER reviewed: As expected    Patient presents to clinic with her daughter Tyesha. She cycles between calm and conversant and sudden bursts of tearful emotion. Her symptom burden is high. She states that she was a happy person prior to starting ca regimen and now cries very frequently without clear provocation. She has breast pain and difficulty walking due to issues with balance. She is fatigued all of the time. She is also very anxious about life logistics: selling her house,  frequent medical appointments and not being able to get all of her meds from one pharmacy. She anticipates upcoming scans and appointments will help define treatment decisions. RTC after next appointment with Dr. Ortez.     Past Medical History:   Diagnosis Date    Anxiety disorder, unspecified     Breast cancer     Coronary artery disease     GERD (gastroesophageal reflux disease)     Thyroid disease      Past Surgical History:   Procedure Laterality Date    APPENDECTOMY      BREAST BIOPSY      CHOLECYSTECTOMY       Family History   Problem Relation Age of Onset    Thyroid cancer Mother     Lung cancer Father     Colon cancer Father     Breast cancer Sister     Cancer Brother      Review of patient's allergies indicates:   Allergen Reactions    Pregabalin Swelling    Atorvastatin Other (See Comments)     Muscle weakness      Dexlansoprazole Other (See Comments)     Other reaction(s): HAND SPASMS  Muscle weakness      Ezetimibe Other (See Comments)     Other reaction(s): MUSCLE PAIN  Muscle pain      Gabapentin Other (See Comments)     Other reaction(s): MAKES HER DIZZY  Dizziness      Onabotulinumtoxina      Medical botox    Oxybutynin Other (See Comments)     Other reaction(s): CANT PEE  Stopped urine flow      Rosuvastatin Other (See Comments)     Leg muscle pain      Sertraline Other (See Comments)     Shaking of hands      Meperidine Nausea Only       Medications:    Current Outpatient Medications:     albuterol-ipratropium (DUO-NEB) 2.5 mg-0.5 mg/3 mL nebulizer solution, Take 3 mLs by nebulization every 6 (six) hours as needed for Wheezing or Shortness of Breath. Rescue, Disp: 75 mL, Rfl: 0    ALPRAZolam (XANAX) 0.5 MG tablet, Take 1 tablet (0.5 mg total) by mouth daily as needed for Anxiety., Disp: 30 tablet, Rfl: 0    blood sugar diagnostic Strp, 1 each by Misc.(Non-Drug; Combo Route) route 4 (four) times daily before meals and nightly., Disp: 100 each, Rfl: 0    blood-glucose meter kit, Check glucose  before meals and before bed, Disp: 1 each, Rfl: 0    celecoxib (CELEBREX) 200 MG capsule, TAKE 2 CAPSULES(400 MG) BY MOUTH EVERY DAY, Disp: 30 capsule, Rfl: 0    cetirizine (ZYRTEC) 10 MG tablet, Take 1 tablet (10 mg total) by mouth every evening., Disp: 30 tablet, Rfl: 2    citalopram (CELEXA) 20 MG tablet, Take 1 tablet (20 mg total) by mouth once daily., Disp: 30 tablet, Rfl: 6    exemestane (AROMASIN) 25 mg tablet, Take 1 tablet (25 mg total) by mouth once daily., Disp: 90 tablet, Rfl: 3    fluticasone propionate (FLONASE) 50 mcg/actuation nasal spray, 1 spray by Each Nostril route once daily., Disp: , Rfl:     hydrALAZINE (APRESOLINE) 25 MG tablet, Take 1 tablet (25 mg total) by mouth every 8 (eight) hours as needed (sbp>160)., Disp: , Rfl:     hydroCHLOROthiazide (HYDRODIURIL) 25 MG tablet, , Disp: , Rfl:     HYDROcodone-acetaminophen (NORCO) 7.5-325 mg per tablet, Take 1 tablet by mouth every 24 hours as needed for Pain., Disp: 30 tablet, Rfl: 0    levothyroxine (SYNTHROID) 75 MCG tablet, Take 1 tablet (75 mcg total) by mouth once daily., Disp: 90 tablet, Rfl: 3    meclizine (ANTIVERT) 25 mg tablet, Take 1 tablet (25 mg total) by mouth every 6 (six) hours as needed for Dizziness., Disp: , Rfl: 0    NASAL SPRAY, OXYMETAZOLINE, 0.05 % nasal spray, , Disp: , Rfl:     pantoprazole (PROTONIX) 40 MG tablet, Take 1 tablet (40 mg total) by mouth 2 (two) times daily., Disp: 60 tablet, Rfl: 0    amLODIPine (NORVASC) 10 MG tablet, Take 1 tablet (10 mg total) by mouth once daily. (Patient not taking: Reported on 5/31/2023), Disp: 30 tablet, Rfl: 11    artificial tears (ISOPTO TEARS) 0.5 % ophthalmic solution, Place 1 drop into both eyes 3 (three) times daily. (Patient not taking: Reported on 5/31/2023), Disp: , Rfl:     carvediloL (COREG) 6.25 MG tablet, Take 1 tablet (6.25 mg total) by mouth 2 (two) times daily., Disp: 60 tablet, Rfl: 11    fexofenadine (ALLEGRA) 180 MG tablet, Take 1 tablet by mouth once daily., Disp:  , Rfl:     insulin aspart U-100 (NOVOLOG) 100 unit/mL (3 mL) InPn pen, Inject 0-5 Units into the skin 4 (four) times daily before meals and nightly. Low dose sliding scale., Disp: , Rfl: 0    insulin aspart U-100 (NOVOLOG) 100 unit/mL (3 mL) InPn pen, Inject 8 Units into the skin 3 (three) times daily., Disp: , Rfl: 0    insulin detemir U-100 (LEVEMIR FLEXTOUCH) 100 unit/mL (3 mL) SubQ InPn pen, Inject 18 Units into the skin once daily., Disp: , Rfl: 0    levETIRAcetam (KEPPRA) 500 MG Tab, TAKE 1 TABLET(500 MG) BY MOUTH TWICE DAILY, Disp: 180 tablet, Rfl: 2    melatonin (MELATIN) 3 mg tablet, Take 2 tablets (6 mg total) by mouth nightly as needed for Insomnia., Disp: , Rfl: 0    ondansetron (ZOFRAN-ODT) 4 MG TbDL, Take 1 tablet (4 mg total) by mouth every 8 (eight) hours as needed., Disp: , Rfl:     polyethylene glycol (GLYCOLAX) 17 gram PwPk, Take 17 g by mouth 2 (two) times daily as needed., Disp: , Rfl: 0    simethicone (MYLICON) 80 MG chewable tablet, Take 1 tablet (80 mg total) by mouth 4 (four) times daily as needed., Disp: , Rfl: 0    SITagliptin phosphate (JANUVIA) 100 MG Tab, Take 1 tablet (100 mg total) by mouth once daily., Disp: 90 tablet, Rfl: 3    traZODone (DESYREL) 50 MG tablet, Take 0.5 tablets (25 mg total) by mouth nightly as needed for Insomnia., Disp: , Rfl:     OBJECTIVE:       ROS:  Review of Systems   Constitutional:  Positive for activity change and fatigue. Negative for appetite change.   HENT:  Negative for congestion, dental problem and drooling.    Eyes:  Negative for pain, discharge and itching.   Respiratory:  Negative for cough, shortness of breath and wheezing.    Cardiovascular:  Negative for chest pain, palpitations and leg swelling.   Gastrointestinal:  Positive for diarrhea and nausea. Negative for abdominal pain.   Endocrine: Negative for cold intolerance, heat intolerance and polydipsia.   Genitourinary:  Negative for difficulty urinating, dyspareunia and dysuria.        Left  breast pain     Musculoskeletal:  Positive for arthralgias and gait problem.   Skin:  Negative for color change, pallor and rash.   Neurological:  Positive for dizziness, tremors and weakness.   Psychiatric/Behavioral:  Positive for dysphoric mood and sleep disturbance. The patient is nervous/anxious.      Review of Symptoms      Symptom Assessment (ESAS 0-10 Scale)  Pain:  8  Dyspnea:  10  Anxiety:  10  Nausea:  4  Depression:  10  Anorexia:  0  Fatigue:  10  Insomnia:  10  Restlessness:  0  Agitation:  0     CAM / Delirium:  Negative  Constipation:  Negative  Diarrhea:  Positive    Anxiety:  Is nervous/anxious    Bowel Management Plan (BMP):  Yes      Pain Assessment:    Location(s): other (breast)      Modified Татьяна Scale:  0    ECOG Performance Status thGthrthathdtheth:th th4th Living Arrangements:  Lives with family    Psychosocial/Cultural:   See Palliative Psychosocial Note: No  **Primary  to Follow**  Palliative Care  Consult: No  Other       Location (Other): breast       Location: left       Quality: none        Quantity: 8/10 in intensity        Chronicity: Onset 3 month(s) ago, stable        Aggravating Factors: none        Alleviating Factors: opiates        Associated Symptoms: none  Advance Care Planning   Advance Directives:   Living Will: No        Oral Declaration: No    LaPOST: No    Do Not Resuscitate Status: No    Medical Power of : No        Oral Declaration: No      Decision Making:  Patient answered questions  Goals of Care: The patient endorses that what is most important right now is to focus on improvement in condition but with limits to invasive therapies    Accordingly, we have decided that the best plan to meet the patient's goals includes continuing with treatment        Physical Exam:  Vitals: BP: 126/65, wt: 73.3, pulse: 89  Physical Exam  Vitals reviewed.   Constitutional:       General: She is not in acute distress.     Appearance: Normal appearance. She is  normal weight. She is not ill-appearing, toxic-appearing or diaphoretic.   HENT:      Head: Normocephalic and atraumatic.      Right Ear: External ear normal.      Left Ear: External ear normal.      Nose: Nose normal.   Eyes:      Extraocular Movements: Extraocular movements intact.      Conjunctiva/sclera: Conjunctivae normal.   Cardiovascular:      Rate and Rhythm: Normal rate.   Pulmonary:      Effort: Pulmonary effort is normal. No respiratory distress.      Breath sounds: No stridor.   Musculoskeletal:         General: No deformity or signs of injury. Normal range of motion.      Cervical back: Normal range of motion.      Comments: Wheelchair     Skin:     General: Skin is warm and dry.      Coloration: Skin is not jaundiced.      Findings: No bruising or lesion.   Neurological:      Mental Status: She is alert and oriented to person, place, and time. Mental status is at baseline.      Motor: Weakness present.      Coordination: Coordination abnormal.      Gait: Gait abnormal.   Psychiatric:         Attention and Perception: Attention and perception normal.         Mood and Affect: Affect is tearful.         Speech: Speech normal.         Behavior: Behavior is cooperative.         Thought Content: Thought content normal.       Labs:  CBC:   WBC   Date Value Ref Range Status   03/21/2023 10.10 3.90 - 12.70 K/uL Final     Hemoglobin   Date Value Ref Range Status   03/21/2023 14.6 12.0 - 16.0 g/dL Final     Hematocrit   Date Value Ref Range Status   03/21/2023 46.0 37.0 - 48.5 % Final     MCV   Date Value Ref Range Status   03/21/2023 87 82 - 98 fL Final     Platelets   Date Value Ref Range Status   03/21/2023 226 150 - 450 K/uL Final       LFT:   Lab Results   Component Value Date    AST 17 03/21/2023    ALKPHOS 76 03/21/2023    BILITOT 1.0 03/21/2023       Albumin:   Albumin   Date Value Ref Range Status   03/21/2023 3.7 3.5 - 5.2 g/dL Final     Protein:   Total Protein   Date Value Ref Range Status  "  03/21/2023 6.4 6.0 - 8.4 g/dL Final       Radiology:I have reviewed all pertinent imaging results/findings within the past 24 hours.    03/03/2023 US L breast: "Impression:  Left  Mass: Left breast mass at the upper inner middle 11 o'clock position, stable. Assessment: 6 - Known biopsy, proven malignancy. "    03/01/2023 CT CAP: "Impression:     Interval decrease in size of left breast soft tissue collection.     Diverticulosis coli.     Subcentimeter splenic hypodensities too small to characterize.     Interval development of a mild right-sided pleural fluid collection."    03/21/2023 MRI brain "Interval increased sized now more rounded nodular enhancing lesion in medial left temporal lobe concerning for progression of metastatic disease in light of history     Punctate new question enhancing focus versus artifact in the left inferior frontal lobe.  Cannot exclude separate region of metastatic disease."       I spent a total of 70 minutes on the day of the visit.This includes face to face time in discussion of goals of care, symptom assessment, coordination of care and emotional support.  This also includes non-face to face time preparing to see the patient (eg, review of tests/imaging), obtaining and/or reviewing separately obtained history, documenting clinical information in the electronic or other health record, independently interpreting results and communicating results to the patient/family/caregiver, or care coordinator.     A total of 16 min was spent on advance care planning, goals of care discussion, emotional support, formulating and communicating prognosis and goals of care, exploring burden/benefit of various approaches of treatment. This discussion occurred on a fully voluntary basis with the verbal consent of the patient and/or family    Signature: Gi Katz DNP  "

## 2023-05-31 NOTE — Clinical Note
"Hi Dr. Ortez,   I met with this patient in [facility] this week. She is having a difficult time. She was extremely tearful throughout our visit, I know you are aware of these episodes. She is extremely anxious, fatigued, and distressed over the loss of her independence. Of note, she thought we were taking over ALL of her care, she's very frustrated by the number of doctors/departments she sees. In other words, she is overwhelmed, she is grieving her loss of independence and she is struggling with the frequent episodes of tearfulness ("over nothing"). She does have pain, controlled with the norco, but she takes it infrequently for fear of becoming addicted. We listened to her concerns, encouraged her to take PRN medications that help live more comfortably. We agreed to meet again after your next appointment and discuss the outcome of the scans/info obtained in between.   Thank you for the referral,  Gi"

## 2023-06-01 NOTE — PROGRESS NOTES
Advance Care Planning   [facility] Palliative Medicine [facility]  Palliative Care   Psychosocial Assessment    Patient Name: Candy Oreilly  MRN: [id number]  Palliative Care Provider: Gi Katz DNP   Primary Care Physician: WEI CHI MD  Principal Problem: breast cancer     Reason for Referral:  Advanced Care Planning and Psychosocial Support       Present during Interview: patient and daughter .      Primary Language:English   Needed: no      Past Medical Situation:   PMH:   Past Medical History:   Diagnosis Date    Anxiety disorder, unspecified     Breast cancer     Coronary artery disease     GERD (gastroesophageal reflux disease)     Thyroid disease      Mental Health/Substance Use History: Patient endorses depression and anxiety   Risk of Abuse, neglect or exploitation: None identified   Current or Previous Trauma and/or evidence of PTSD: None identified   Non-traditional Health practices: None identified     Understanding of diagnosis and prognosis: Fair   Experience/Comfort level with health care system: Fair     Patients Mental Status: Alert and oriented to person, place, time and situation with labile mood     Socio-Economic Factors/Resources:  Address: [address] [place] [address] [place] [address]  Phone Number: [phone] (home) [phone] (work)    Marital Status:    Household composition: Patient and daughter   Children: One daughter and one son     Patient/Family perceptions about Caregiving Needs; availability and capacity: Daughter has moved patient into her residence to provide support.     Family Dynamics/Relationships:Healthy     Patient/Family Strengths/Resilience: Patient openly expresses her feelings to [facility] team.   Patient/Family Coping:  Patient would benefit from additional support in coping with the burdens of her disease, the losses of loved ones, and the sale of the home she shared with her spouse.     Activities of Daily Living: Assistance as  "needed   Support Systems-Family & Community (Home Health, HME etc): n/a     Transportation:  yes    Work/Education History: retired   Self-Care Activities/Hobbies:  Painting and coloring      History: no    Financial Resources:Medicare      Advanced Care Planning & Legal Concerns:   Advanced Directives/Living Will: no  LaPOST/POLST: no   Planning:  no    Power of : no    Emergency Contacts: Daughter/Tyesha     Spirituality, Culture & Coping Mechanisms:  F- Letha and Belief: Jehovah's witness      I - Importance: High     C - Community/Culture Values: patient in regular contact with her preacher      A - Address in Care: patient informed of services       Goals/Hopes/Expectations: Patient wishes to have a cohesive treatment team.   Fears/Anxiety/Concerns: Patient admits there are a lot of "unknowns" which exacerbates her anxiety.      Complicated Bereavement Risk Assessment Tool (CBRAT)  Reference:  [facility] Clinical Practice Group (May 2016). Bereavement Risk Screening and Management Guidelines.  Retrieved from: http://www.grpcc.com.au/wp-content/uploads//PLOFH-Roniuhhbryb-Bdopmowuv-and-Management-Guideline-2016.pdf      Bereaved Client Characteristics   Under 18      no  Was a Twin   no  Young Spouse   no  Elderly Spouse    no  Isolated    no  Lacks Meaningful Social Support   no  Dissatisfied with help available during illness   no  New to Financial Wellman no  New to Decision-Making   no    Illness  Inherited Disorder   no  Stigmatized Disease in the family/community   no  Lengthy/Burdensome   no     Bereaved Client's History of Loss   Cumulative Multiple Losses   no  Previous Mental Health Illnesses   no  Current Mental Health Illness   no  Other Significant Health Issues   yes   Migrant/Refugee   no Death  Sudden or Unexpected   no  Traumatic Circumstances Associated with Death   no  Significant Cultural/Social Burdens as a result of " "Death   yes   Relationship with   Profound Lifelong Partner   no  Highly Dependent    no  Antagonistic   no  Ambivalent   no  Deeply Connected   yes  Culturally Defined   yes   Risk Factors Scores  0-2  Low  3-5  Moderate  5+  High  All persons scoring moderate to high presume to be at risk**    (** It is acknowledged that protective factors and resilience may outweigh apparent risk factors.      Total Risk Factors Score:   Low to moderate         Plan of Care:     SW accompanied MD during initial visit with patient and daughter/Tyesha. Patient presents Oriented x4 with labile mood . Patient appears to have a fair understanding of her illness. Patient admits to depressed mood and anxiety. Patient endorses sudden crying spells (a number which occurred during this visit). Patient acknowledges these have occurred in the "last few years". Patient reports these are triggered by the emotional burdens of her disease, the stress of selling her home, and the losses of her parents and spouse (all of which occurred in the late Nineties). SW and DNP educated patient on the benefits of psychotherapy and offered referral to services.  Patient reports she tried this in the past and it "didn't work". Team strongly suggested patient consider this offer and plan to discuss further at next visit.     Patient's apparent primary goal is to secure a cohesive medical team. Patient became tearful and distraught when informed this clinic will not serve the roll of a primary care clinic. DNP educated patient regarding the role of this clinic and suggested avenues to obtain primary care in the [facility] system.     Patient denies further psychosocial concerns. SW remains available to provide emotional support. SW will continue to follow.    Brook Barajas LCSW  Outpatient   Palliative Medicine                      "

## 2023-06-05 ENCOUNTER — HOSPITAL ENCOUNTER (OUTPATIENT)
Dept: RADIOLOGY | Facility: HOSPITAL | Age: 88
Discharge: HOME | End: 2023-06-05
Attending: INTERNAL MEDICINE
Payer: MEDICARE

## 2023-06-05 DIAGNOSIS — C50.212 MALIGNANT NEOPLASM OF UPPER-INNER QUADRANT OF LEFT BREAST IN FEMALE, ESTROGEN RECEPTOR POSITIVE: ICD-10-CM

## 2023-06-05 DIAGNOSIS — Z17.0 MALIGNANT NEOPLASM OF UPPER-INNER QUADRANT OF LEFT BREAST IN FEMALE, ESTROGEN RECEPTOR POSITIVE: ICD-10-CM

## 2023-06-05 PROCEDURE — 77061 BREAST TOMOSYNTHESIS UNI: CPT | Mod: TC,LT

## 2023-06-05 PROCEDURE — 77061 MAMMO DIGITAL DIAGNOSTIC LEFT WITH TOMO: ICD-10-PCS | Mod: 26,LT,, | Performed by: RADIOLOGY

## 2023-06-05 PROCEDURE — 76642 ULTRASOUND BREAST LIMITED: CPT | Mod: TC,LT

## 2023-06-05 PROCEDURE — 77065 DX MAMMO INCL CAD UNI: CPT | Mod: 26,LT,, | Performed by: RADIOLOGY

## 2023-06-05 PROCEDURE — 77061 BREAST TOMOSYNTHESIS UNI: CPT | Mod: 26,LT,, | Performed by: RADIOLOGY

## 2023-06-05 PROCEDURE — 76642 US BREAST LEFT LIMITED: ICD-10-PCS | Mod: 26,LT,, | Performed by: RADIOLOGY

## 2023-06-05 PROCEDURE — 77065 MAMMO DIGITAL DIAGNOSTIC LEFT WITH TOMO: ICD-10-PCS | Mod: 26,LT,, | Performed by: RADIOLOGY

## 2023-06-05 PROCEDURE — 76642 ULTRASOUND BREAST LIMITED: CPT | Mod: 26,LT,, | Performed by: RADIOLOGY

## 2023-06-16 DIAGNOSIS — K21.9 GASTROESOPHAGEAL REFLUX DISEASE WITHOUT ESOPHAGITIS: ICD-10-CM

## 2023-06-16 RX ORDER — CELECOXIB 200 MG/1
200 CAPSULE ORAL DAILY
Qty: 30 CAPSULE | Refills: 1 | Status: SHIPPED | OUTPATIENT
Start: 2023-06-16 | End: 2023-07-12 | Stop reason: SDUPTHER

## 2023-07-10 DIAGNOSIS — K21.9 GASTROESOPHAGEAL REFLUX DISEASE WITHOUT ESOPHAGITIS: ICD-10-CM

## 2023-07-10 RX ORDER — PANTOPRAZOLE SODIUM 40 MG/1
40 TABLET, DELAYED RELEASE ORAL 2 TIMES DAILY
Qty: 60 TABLET | Refills: 0 | Status: CANCELLED | OUTPATIENT
Start: 2023-07-10 | End: 2023-08-09

## 2023-07-10 NOTE — TELEPHONE ENCOUNTER
----- Message from Arvin Alaniz sent at 7/10/2023  2:21 PM CDT -----  Regarding: Patient advice  Contact: Tyesha  Pt daughter called in regards to getting an medication refill for Pt       pantoprazole (PROTONIX) 40 MG tablet 60 tablet 0 4/21/2023 5/31/2023 No  Sig - Route: Take 1 tablet (40 mg total) by mouth 2 (two) times daily. - Oral  Sent to pharmacy as: pantoprazole (PROTONIX) 40 MG tablet  Class: Normal  Order: [id number]  Date/Time Signed: 4/21/2023 15:57      E-Prescribing Status: Receipt confirmed by pharmacy (4/21/2023  3:57 PM CDT)    [id number] [place] [address] AT [facility] [address]  [phone]      Pt can be reached at [phone]

## 2023-07-12 ENCOUNTER — HOSPITAL ENCOUNTER (OUTPATIENT)
Dept: RADIOLOGY | Facility: HOSPITAL | Age: 88
Discharge: HOME | End: 2023-07-12
Attending: RADIOLOGY
Payer: MEDICARE

## 2023-07-12 ENCOUNTER — OFFICE VISIT (OUTPATIENT)
Dept: RADIATION ONCOLOGY | Facility: CLINIC | Age: 88
End: 2023-07-12
Payer: MEDICARE

## 2023-07-12 ENCOUNTER — OFFICE VISIT (OUTPATIENT)
Dept: HEMATOLOGY/ONCOLOGY | Facility: CLINIC | Age: 88
End: 2023-07-12
Payer: MEDICARE

## 2023-07-12 VITALS
WEIGHT: 155.19 LBS | SYSTOLIC BLOOD PRESSURE: 142 MMHG | DIASTOLIC BLOOD PRESSURE: 73 MMHG | HEART RATE: 71 BPM | WEIGHT: 155.19 LBS | HEIGHT: 63 IN | DIASTOLIC BLOOD PRESSURE: 73 MMHG | TEMPERATURE: 97 F | OXYGEN SATURATION: 98 % | OXYGEN SATURATION: 98 % | BODY MASS INDEX: 27.49 KG/M2 | BODY MASS INDEX: 27.5 KG/M2 | SYSTOLIC BLOOD PRESSURE: 142 MMHG | RESPIRATION RATE: 17 BRPM | HEART RATE: 71 BPM | RESPIRATION RATE: 18 BRPM

## 2023-07-12 DIAGNOSIS — C79.31 SECONDARY MALIGNANT NEOPLASM OF BRAIN: Primary | ICD-10-CM

## 2023-07-12 DIAGNOSIS — K21.9 GASTROESOPHAGEAL REFLUX DISEASE WITHOUT ESOPHAGITIS: ICD-10-CM

## 2023-07-12 DIAGNOSIS — Z17.0 MALIGNANT NEOPLASM OF UPPER-INNER QUADRANT OF LEFT BREAST IN FEMALE, ESTROGEN RECEPTOR POSITIVE: ICD-10-CM

## 2023-07-12 DIAGNOSIS — C50.212 MALIGNANT NEOPLASM OF UPPER-INNER QUADRANT OF LEFT BREAST IN FEMALE, ESTROGEN RECEPTOR POSITIVE: ICD-10-CM

## 2023-07-12 DIAGNOSIS — M25.50 AROMATASE INHIBITOR-ASSOCIATED ARTHRALGIA: ICD-10-CM

## 2023-07-12 DIAGNOSIS — T45.1X5A AROMATASE INHIBITOR-ASSOCIATED ARTHRALGIA: ICD-10-CM

## 2023-07-12 DIAGNOSIS — F41.9 ANXIETY: ICD-10-CM

## 2023-07-12 DIAGNOSIS — C79.31 SECONDARY MALIGNANT NEOPLASM OF BRAIN: ICD-10-CM

## 2023-07-12 DIAGNOSIS — G89.3 CANCER RELATED PAIN: ICD-10-CM

## 2023-07-12 DIAGNOSIS — E03.9 ACQUIRED HYPOTHYROIDISM: ICD-10-CM

## 2023-07-12 PROCEDURE — 25500020 PHARM REV CODE 255: Performed by: RADIOLOGY

## 2023-07-12 PROCEDURE — 99999 PR PBB SHADOW E&M-EST. PATIENT-LVL IV: CPT | Mod: PBBFAC,,, | Performed by: INTERNAL MEDICINE

## 2023-07-12 PROCEDURE — 99212 OFFICE O/P EST SF 10 MIN: CPT | Mod: PBBFAC,25 | Performed by: RADIOLOGY

## 2023-07-12 PROCEDURE — 70553 MRI BRAIN W WO CONTRAST: ICD-10-PCS | Mod: 26,,, | Performed by: RADIOLOGY

## 2023-07-12 PROCEDURE — A9585 GADOBUTROL INJECTION: HCPCS | Performed by: RADIOLOGY

## 2023-07-12 PROCEDURE — 99024 POSTOP FOLLOW-UP VISIT: CPT | Mod: POP,,, | Performed by: RADIOLOGY

## 2023-07-12 PROCEDURE — 99214 OFFICE O/P EST MOD 30 MIN: CPT | Mod: PBBFAC,25,27 | Performed by: INTERNAL MEDICINE

## 2023-07-12 PROCEDURE — 99215 OFFICE O/P EST HI 40 MIN: CPT | Mod: S$PBB,,, | Performed by: INTERNAL MEDICINE

## 2023-07-12 PROCEDURE — 99215 PR OFFICE/OUTPT VISIT, EST, LEVL V, 40-54 MIN: ICD-10-PCS | Mod: S$PBB,,, | Performed by: INTERNAL MEDICINE

## 2023-07-12 PROCEDURE — 70553 MRI BRAIN STEM W/O & W/DYE: CPT | Mod: 26,,, | Performed by: RADIOLOGY

## 2023-07-12 PROCEDURE — 99024 PR POST-OP FOLLOW-UP VISIT: ICD-10-PCS | Mod: POP,,, | Performed by: RADIOLOGY

## 2023-07-12 PROCEDURE — 99999 PR PBB SHADOW E&M-EST. PATIENT-LVL IV: ICD-10-PCS | Mod: PBBFAC,,, | Performed by: INTERNAL MEDICINE

## 2023-07-12 PROCEDURE — 99999 PR PBB SHADOW E&M-EST. PATIENT-LVL II: ICD-10-PCS | Mod: PBBFAC,,, | Performed by: RADIOLOGY

## 2023-07-12 PROCEDURE — 70553 MRI BRAIN STEM W/O & W/DYE: CPT | Mod: TC

## 2023-07-12 PROCEDURE — 99999 PR PBB SHADOW E&M-EST. PATIENT-LVL II: CPT | Mod: PBBFAC,,, | Performed by: RADIOLOGY

## 2023-07-12 RX ORDER — EXEMESTANE 25 MG/1
25 TABLET ORAL DAILY
Qty: 90 TABLET | Refills: 3 | Status: SHIPPED | OUTPATIENT
Start: 2023-07-12 | End: 2024-07-11

## 2023-07-12 RX ORDER — CITALOPRAM 20 MG/1
20 TABLET, FILM COATED ORAL DAILY
Qty: 30 TABLET | Refills: 6 | Status: SHIPPED | OUTPATIENT
Start: 2023-07-12

## 2023-07-12 RX ORDER — GADOBUTROL 604.72 MG/ML
8 INJECTION INTRAVENOUS
Status: COMPLETED | OUTPATIENT
Start: 2023-07-12 | End: 2023-07-12

## 2023-07-12 RX ORDER — HYDROCODONE BITARTRATE AND ACETAMINOPHEN 7.5; 325 MG/1; MG/1
1 TABLET ORAL EVERY 8 HOURS PRN
Qty: 30 TABLET | Refills: 0 | Status: SHIPPED | OUTPATIENT
Start: 2023-07-12 | End: 2023-10-10

## 2023-07-12 RX ORDER — ALPRAZOLAM 0.5 MG/1
0.5 TABLET ORAL 2 TIMES DAILY PRN
Qty: 30 TABLET | Refills: 0 | Status: SHIPPED | OUTPATIENT
Start: 2023-07-12 | End: 2023-07-27

## 2023-07-12 RX ORDER — CETIRIZINE HYDROCHLORIDE 10 MG/1
10 TABLET ORAL NIGHTLY
Qty: 30 TABLET | Refills: 2 | Status: SHIPPED | OUTPATIENT
Start: 2023-07-12

## 2023-07-12 RX ORDER — LEVOTHYROXINE SODIUM 75 UG/1
75 TABLET ORAL DAILY
Qty: 90 TABLET | Refills: 3 | Status: SHIPPED | OUTPATIENT
Start: 2023-07-12

## 2023-07-12 RX ORDER — PANTOPRAZOLE SODIUM 40 MG/1
40 TABLET, DELAYED RELEASE ORAL 2 TIMES DAILY
Qty: 60 TABLET | Refills: 0 | Status: SHIPPED | OUTPATIENT
Start: 2023-07-12 | End: 2023-08-10

## 2023-07-12 RX ORDER — CELECOXIB 200 MG/1
200 CAPSULE ORAL DAILY
Qty: 30 CAPSULE | Refills: 1 | Status: SHIPPED | OUTPATIENT
Start: 2023-07-12

## 2023-07-12 RX ORDER — HYDROCHLOROTHIAZIDE 25 MG/1
25 TABLET ORAL DAILY
Qty: 90 TABLET | Refills: 6 | Status: SHIPPED | OUTPATIENT
Start: 2023-07-12

## 2023-07-12 RX ADMIN — GADOBUTROL 8 ML: 604.72 INJECTION INTRAVENOUS at 02:07

## 2023-07-12 NOTE — PROGRESS NOTES
"                                                 PROGRESS NOTE     Subjective:       Patient ID: Candy Oreilly is a 88 y.o. female.  MRN: [id number]  : 1935    Chief Complaint: Left breast IDC, brain mass     History of Present Illness:   Candy Oreilly is a 88 y.o. female who is referred for Left breast IDC    Ms. Candy Oreilly is an 87 year old woman with CAD, hypothyroidism who presented as a transfer for temporal brain mass,seen on  while inpatient by the oncology team. She initially presented with difficulty with word finding and mild disorientation. She has associated headaches and nausea. She had a MRI brain on  that showed enhancing mass in the medial temporal lobe. When she was admitted, Neurosurgery was consulted. She was started on dexamethasone and keppra with clinical improvement.    Staging work up showed a 2 cm left breast mass, biopsied to be IDC, G2, ER/GA strongly positive, Her 2 sean 2+, FISH negative,      She is a never smoker.   FH:  father with lung, prostate, colon cancer, a mother with thyroid cancer, a sister with breast cancer and an uncle with "multiple" cancers.    Started anastrozole .   Has now noticed worsening anxiety, arthropathies. Cries easily, on Celexa, xanax and Trazadone.   Does not have a good balance. Walks with a walker but mostly wheel chair bound.   Reports loss of appetite recently as well, has lost an additional 4 lbs.     On , she underwent SRS to the left temporal lesion as it was enlarging.   Interim history:   Has been on exemestane since her last visit. Had a follow up MRI today and presents with her daughter to discuss results and symptoms.     Has been feeling dizzy and weak since her last visit. Has had intermittent headaches. Has mild tremor in her hands. Is ambulating within the house with a walker, she does have a cane as well.   She did have a fall when she tripped on her shoe.         Oncology " History:  11/21/22  FINDINGS:  An enhancing mass in the left medial temporal lobe measures 1.5 x 1.9 cm in size, with surrounding edema (series 4, image 21).  There is no definite additional enhancing mass identified.     There is local mass effect without midline shift or herniation.  There is effacement of the left temporal horn.  There is no midline shift or downward herniation.  The basal cisterns are patent.  There is no abnormal extra-axial fluid collection.  The major vessels enhance normally.  The calvarium and skull base are intact.     Impression:     Enhancing left medial temporal lobe mass with surrounding edema, concerning for malignancy.     11/23/22  Brain MRI     Impression:     Enhancing mass in the medial temporal lobe possibly interventricular.  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     Minimal surrounding vasogenic type edema with no mass effect.     No restricted diffusion to suggest acute infarct.  11/24/22  CT chest abd pelvis  Impression:     1. Soft tissue mass in the left breast.  Malignancy not excluded.  Recommend correlation with physical exam and mammogram.  2. Few solid and ground-glass opacities in the lungs as detailed above measuring up to 0.6 cm.  Clinical and oncologic considerations will determine the role and schedule for continued surveillance.  3. Indeterminate small focus of sclerosis in the anterior C7 vertebral body.  Bone scan could provide further characterization if clinically indicated.  4. 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  5. Additional findings as above.    11/25/22     Impression:  Left  Mass: Left breast 20 mm x 14 mm x 18 mm mass at the 11 o'clock position. Assessment: 4 - Suspicious finding. Biopsy is recommended.      Right  Mammo Digital Diagnostic Bilat with Prince  There is no mammographic evidence of malignancy in the right breast.     US Breast Bilateral Limited  There is no  sonographic evidence of malignancy in the right breast.     BI-RADS Category:   Overall: 4 - Suspicious    11/28/22  Final Pathologic Diagnosis Left breast, ultrasound guided core biopsy:   Invasive ductal carcinoma, moderately differentiated   Sibley grade 2: Tubule formation-3, nuclear pleomorphism-2 and mitotic   count -2   No carcinoma in Situ or lymphovascular invasion is identified   All submitted cores are involved by invasive carcinoma   Tumor biomarkers   Estrogen receptor (ER):  Positive, nearly 100%, strong   Progesterone receptor (PGR):  Positive nearly 100%, strong   HER2 (IHC):  Equivocal, 2+ (will submit for HER2 fish)   Ki-67:  50%   Additional IHC:   AE1/AE3:  Highlights the extent of tumor cells   GATA3:  Strongly, diffusely positive, most compatible with breast   differentiation   CK7:  Strongly positive      Brain MRI 1/17/23     Impression:     Continue though reduced sized enhancing lesion medial left temporal lobe with essential resolution of previous associated edema signal abnormality.This measures 1.0 by 0.3 by 0.4 cm in AP by TV by CC dimensions previously measuring 2.0 x 1.5 x 1.2 cm.       No new lesion or significant new signal abnormality to suggest worsening metastatic disease in light of history     Ventricles relatively stable without hydrocephalus.  No evidence for acute infarction     Clinical correlation and continued follow-up advised    CT chest abd pelvis:  3/2/23  Impression:     Interval decrease in size of left breast soft tissue collection.     Diverticulosis coli.     Subcentimeter splenic hypodensities too small to characterize.     Interval development of a mild right-sided pleural fluid collection.     7/12/23  Brain MRI  Impression:     1. 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  Metastatic disease presumed in light of the history although other lesions including lymphoma could have a similar appearance.  2. Adjacent  enhancing lesions in the left temporal lobe at the site of reported treatment appear markedly smaller.  This report was flagged in Epic as abnormal.  History:  Past Medical History:   Diagnosis Date    Anxiety disorder, unspecified     Breast cancer     Coronary artery disease     GERD (gastroesophageal reflux disease)     Thyroid disease       Past Surgical History:   Procedure Laterality Date    APPENDECTOMY      BREAST BIOPSY      CHOLECYSTECTOMY       Family History   Problem Relation Age of Onset    Thyroid cancer Mother     Lung cancer Father     Colon cancer Father     Breast cancer Sister     Cancer Brother       Social History     Tobacco Use    Smoking status: Never    Smokeless tobacco: Never   Substance and Sexual Activity    Alcohol use: Not on file    Drug use: Not on file    Sexual activity: Not on file        ROS:   Review of Systems   Constitutional:  Negative for fever, malaise/fatigue and weight loss.   HENT:  Negative for congestion, hearing loss, nosebleeds and sore throat.    Eyes:  Negative for double vision and photophobia.   Respiratory:  Negative for cough, hemoptysis, sputum production, shortness of breath and wheezing.    Cardiovascular:  Negative for chest pain, palpitations, orthopnea and leg swelling.   Gastrointestinal:  Negative for abdominal pain, blood in stool, constipation, diarrhea, heartburn, nausea and vomiting.   Genitourinary:  Negative for dysuria, hematuria and urgency.   Musculoskeletal:  Positive for joint pain. Negative for back pain and myalgias.   Skin:  Negative for itching and rash.   Neurological:  Positive for dizziness and weakness. Negative for tingling, seizures and headaches.   Endo/Heme/Allergies:  Negative for polydipsia. Does not bruise/bleed easily.   Psychiatric/Behavioral:  Negative for depression and memory loss. The patient is nervous/anxious. The patient does not have insomnia.       Objective:     Vitals:    07/12/23 1439   BP: (!) 142/73   Pulse: 71  "  Resp: 18   Temp: 97.4 °F (36.3 °C)   TempSrc: Oral   SpO2: 98%   Weight: 70.4 kg (155 lb 3.3 oz)   Height: 5' 3" (1.6 m)   PainSc: 0-No pain         Physical Examination:   Physical Exam  Vitals and nursing note reviewed.   Constitutional:       General: She is not in acute distress.     Appearance: She is not diaphoretic.   HENT:      Head: Normocephalic.      Mouth/Throat:      Pharynx: No oropharyngeal exudate.   Eyes:      General: No scleral icterus.     Conjunctiva/sclera: Conjunctivae normal.   Neck:      Thyroid: No thyromegaly.   Cardiovascular:      Rate and Rhythm: Normal rate and regular rhythm.      Heart sounds: Normal heart sounds. No murmur heard.  Pulmonary:      Effort: Pulmonary effort is normal. No respiratory distress.      Breath sounds: No stridor. No wheezing or rales.   Chest:      Chest wall: No tenderness.   Abdominal:      General: Bowel sounds are normal. There is no distension.      Palpations: Abdomen is soft. There is no mass.      Tenderness: There is no abdominal tenderness. There is no rebound.   Musculoskeletal:         General: No tenderness or deformity. Normal range of motion.      Cervical back: Neck supple.   Lymphadenopathy:      Cervical: No cervical adenopathy.   Skin:     General: Skin is warm and dry.      Findings: No erythema or rash.   Neurological:      Mental Status: She is alert and oriented to person, place, and time.      Cranial Nerves: No cranial nerve deficit.      Coordination: Coordination normal.      Gait: Gait is intact.      Comments: Wheel chair bound    Psychiatric:         Mood and Affect: Affect normal.         Cognition and Memory: Memory normal.         Judgment: Judgment normal.        Diagnostic Tests:  Significant Imaging: I have reviewed and interpreted all pertinent imaging results/findings.      Laboratory Data:  All pertinent labs have been reviewed.    Labs:   Lab Results   Component Value Date    WBC 10.10 03/21/2023    HGB 14.6 " 03/21/2023    HCT 46.0 03/21/2023    MCV 87 03/21/2023     03/21/2023       Assessment/Plan:   Malignant neoplasm of overlapping sites of left breast in female, estrogen receptor positive  [id number] vs M1 G2 ER/WV strongly positive, Her 2 sean 2+ FISH negative     Patient is 88 years old, previously living independently, recently presented with symptoms secondary to a brain mass in the left medial temporal lobe.  Staging workup showed a left breast mass, biopsy consistent with IDC.    The brain mass  has not been biopsied, presumed to be metastatic from left breast cancer.    She is currently on exemestane after tolerating anastrozole poorly. Had crying spells with it initially but wants to continue at this time, saw palliative care to continue GOC discussion.     MRI shows significant LMD, discussed with patient, her daughter and Dr. uLgo. She will not be offered WBRTas it is unlikely to be beneficial.     We discussed systemic therapies including oral chemo, capecitabine, IV EnHertu ( low Her2+), IT chemotherapy, vs supportive care alone. We discussed that given her already compromised PS and poor tolerability of medications, her QOL will be impacted with any aggressive treatments and the benefit remains unclear. I have recommended supportive care alone. She wants to think about her options and will return in 2 weeks to make her decision.   Meanwhile, continue Palliative care follow up.       Secondary brain metastasis  Lesion was enlarging on follow up MRI in March. Discussed with Dr. Lugo, she is now s/p SRS. As above.     Long term (current) use of aromatase inhibitors  Continue calcium and vitamin-D supplementation.    Pain and anxiety   Refilled Norco and xanax. Referred to palliative care.     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  Nodules are  subcentimeter, no new changes reported.     Diabetes Mellitus   Off steroids, monitor finger sticks.     Hypothyroidism    Refilled levothyroxine      ECOG SCORE    2 - Capable of all selfcare but unable to carry out any work activities, active > 50% of hours           Discussion:   No follow-ups on file.    Plan was discussed with the patient at length, and she verbalized understanding. Candy was given an opportunity to ask questions that were answered to her satisfaction, and she was advised to call in the interval if any problems or questions arise.    Electronically signed by Hattie Ortez MD      Route Chart for Scheduling    Med Onc Chart Routing      Follow up with physician 2 weeks.   Follow up with [facility]    Infusion scheduling note    Injection scheduling note    Labs    Imaging    Pharmacy appointment    Other referrals

## 2023-07-12 NOTE — PROGRESS NOTES
7/12/2023    Radiation Oncology Follow-Up Visit      Prior Radiation History:    Site  Technique  Energy  Dose/Fx (Gy)  #Fx  Total Dose (Gy)  Start Date  End Date  Elapsed Days    Lt Temporal  SRS  6X  22  1 / 1 22 4/6/2023 4/6/2023  0        Is the patient female between ages 15-55:  No    Does the patient have a CIED:  No      Assessment   This is an 88 y.o. female with Stage IV metastatic breast cancer (cT2 cN0 M1) diagnosed on Left breast biopsy 11/28/22 (IDC, grade 2, ER+, Her2 neg by FISH) with apparent oligometastasis to brain. MRI Brain 11/23/22 demonstrated a 1.6 cm enhancing Left medial temporal lobe mass. Neurosurgery did not recommend resection. The mass improved on re-staging scan then began growing again in March 2023. She completed SRS 22 Gy x1 on 4/6/23.    Has recently had dizziness and fatigue. Occasional HA. Denies focal weakness or N/V. MRI Brain today 7/12/23 demonstrates interval development of significant leptomeningeal disease. I discussed that WBRT is unlikely to provide her any benefit, and I'm not sure that she will have any additional systemic options. I recommend hospice to focus on comfort. She will meet with Dr. Ortez this afternoon to discuss if any other treatments available.          Plan   1) Discuss treatment options for LMD with Dr. Ortez; if no good options, recommend enrollment in hospice.            CHIEF COMPLAINT: Follow-up for brain metastasis    HPI/Focused ROS:         Past Medical History:   Diagnosis Date    Anxiety disorder, unspecified     Breast cancer     Coronary artery disease     GERD (gastroesophageal reflux disease)     Thyroid disease        Past Surgical History:   Procedure Laterality Date    APPENDECTOMY      BREAST BIOPSY      CHOLECYSTECTOMY         Social History     Tobacco Use    Smoking status: Never    Smokeless tobacco: Never       Cancer-related family history includes Breast cancer in her sister; Cancer in her brother; Lung cancer in her  father; Thyroid cancer in her mother.    Current Outpatient Medications on File Prior to Visit   Medication Sig Dispense Refill    albuterol-ipratropium (DUO-NEB) 2.5 mg-0.5 mg/3 mL nebulizer solution Take 3 mLs by nebulization every 6 (six) hours as needed for Wheezing or Shortness of Breath. Rescue 75 mL 0    amLODIPine (NORVASC) 10 MG tablet Take 1 tablet (10 mg total) by mouth once daily. (Patient not taking: Reported on 5/31/2023) 30 tablet 11    artificial tears (ISOPTO TEARS) 0.5 % ophthalmic solution Place 1 drop into both eyes 3 (three) times daily. (Patient not taking: Reported on 5/31/2023)      blood sugar diagnostic Strp 1 each by Misc.(Non-Drug; Combo Route) route 4 (four) times daily before meals and nightly. 100 each 0    blood-glucose meter kit Check glucose before meals and before bed 1 each 0    carvediloL (COREG) 6.25 MG tablet Take 1 tablet (6.25 mg total) by mouth 2 (two) times daily. 60 tablet 11    fexofenadine (ALLEGRA) 180 MG tablet Take 1 tablet by mouth once daily.      fluticasone propionate (FLONASE) 50 mcg/actuation nasal spray 1 spray by Each Nostril route once daily.      hydrALAZINE (APRESOLINE) 25 MG tablet Take 1 tablet (25 mg total) by mouth every 8 (eight) hours as needed (sbp>160).      insulin aspart U-100 (NOVOLOG) 100 unit/mL (3 mL) InPn pen Inject 0-5 Units into the skin 4 (four) times daily before meals and nightly. Low dose sliding scale.  0    insulin aspart U-100 (NOVOLOG) 100 unit/mL (3 mL) InPn pen Inject 8 Units into the skin 3 (three) times daily.  0    insulin detemir U-100 (LEVEMIR FLEXTOUCH) 100 unit/mL (3 mL) SubQ InPn pen Inject 18 Units into the skin once daily.  0    levETIRAcetam (KEPPRA) 500 MG Tab TAKE 1 TABLET(500 MG) BY MOUTH TWICE DAILY 180 tablet 2    meclizine (ANTIVERT) 25 mg tablet Take 1 tablet (25 mg total) by mouth every 6 (six) hours as needed for Dizziness.  0    melatonin (MELATIN) 3 mg tablet Take 2 tablets (6 mg total) by mouth nightly as  needed for Insomnia.  0    NASAL SPRAY, OXYMETAZOLINE, 0.05 % nasal spray       ondansetron (ZOFRAN-ODT) 4 MG TbDL Take 1 tablet (4 mg total) by mouth every 8 (eight) hours as needed.      polyethylene glycol (GLYCOLAX) 17 gram PwPk Take 17 g by mouth 2 (two) times daily as needed.  0    simethicone (MYLICON) 80 MG chewable tablet Take 1 tablet (80 mg total) by mouth 4 (four) times daily as needed.  0    SITagliptin phosphate (JANUVIA) 100 MG Tab Take 1 tablet (100 mg total) by mouth once daily. 90 tablet 3    traZODone (DESYREL) 50 MG tablet Take 0.5 tablets (25 mg total) by mouth nightly as needed for Insomnia.      [DISCONTINUED] ALPRAZolam (XANAX) 0.5 MG tablet Take 1 tablet (0.5 mg total) by mouth 2 (two) times daily as needed for Anxiety. 30 tablet 0    [DISCONTINUED] celecoxib (CELEBREX) 200 MG capsule Take 1 capsule (200 mg total) by mouth once daily. 30 capsule 1    [DISCONTINUED] cetirizine (ZYRTEC) 10 MG tablet Take 1 tablet (10 mg total) by mouth every evening. 30 tablet 2    [DISCONTINUED] citalopram (CELEXA) 20 MG tablet Take 1 tablet (20 mg total) by mouth once daily. 30 tablet 6    [DISCONTINUED] exemestane (AROMASIN) 25 mg tablet Take 1 tablet (25 mg total) by mouth once daily. 90 tablet 3    [DISCONTINUED] hydroCHLOROthiazide (HYDRODIURIL) 25 MG tablet       [DISCONTINUED] HYDROcodone-acetaminophen (NORCO) 7.5-325 mg per tablet Take 1 tablet by mouth every 8 (eight) hours as needed for Pain. 30 tablet 0    [DISCONTINUED] levothyroxine (SYNTHROID) 75 MCG tablet Take 1 tablet (75 mcg total) by mouth once daily. 90 tablet 3    [DISCONTINUED] pantoprazole (PROTONIX) 40 MG tablet Take 1 tablet (40 mg total) by mouth 2 (two) times daily. 60 tablet 0     Current Facility-Administered Medications on File Prior to Visit   Medication Dose Route Frequency Provider Last Rate Last Admin    [COMPLETED] gadobutroL (GADAVIST) injection 8 mL  8 mL Intravenous ONCE PRN Henry Lugo MD   8 mL at 07/12/23 9176        Review of patient's allergies indicates:   Allergen Reactions    Pregabalin Swelling    Atorvastatin Other (See Comments)     Muscle weakness      Dexlansoprazole Other (See Comments)     Other reaction(s): HAND SPASMS  Muscle weakness      Ezetimibe Other (See Comments)     Other reaction(s): MUSCLE PAIN  Muscle pain      Gabapentin Other (See Comments)     Other reaction(s): MAKES HER DIZZY  Dizziness      Onabotulinumtoxina      Medical botox    Oxybutynin Other (See Comments)     Other reaction(s): CANT PEE  Stopped urine flow      Rosuvastatin Other (See Comments)     Leg muscle pain      Sertraline Other (See Comments)     Shaking of hands      Meperidine Nausea Only         Vital Signs: BP (!) 142/73   Pulse 71   Resp 17   Wt 70.4 kg (155 lb 3.3 oz)   LMP  (LMP Unknown)   SpO2 98%   BMI 27.49 kg/m²     ECOG Performance Status: 2 - Ambulates, capable of self care only    Physical Exam  Constitutional:       Appearance: Normal appearance.      Comments: Frail appearing   HENT:      Head: Normocephalic and atraumatic.   Eyes:      General: No scleral icterus.     Extraocular Movements: Extraocular movements intact.   Pulmonary:      Effort: No accessory muscle usage or respiratory distress.   Musculoskeletal:      Cervical back: Normal range of motion.   Neurological:      Mental Status: She is alert and oriented to person, place, and time.      Comments: In wheelchair   Psychiatric:         Mood and Affect: Mood and affect normal.         Judgment: Judgment normal.        Labs:    Imaging: I have personally reviewed the patient's available images and reports and summarized pertinent findings above in HPI.     Pathology: No new path

## 2023-07-18 ENCOUNTER — TELEPHONE (OUTPATIENT)
Dept: HEPATOLOGY | Facility: HOSPITAL | Age: 88
End: 2023-07-18
Payer: MEDICARE

## 2023-07-18 NOTE — TELEPHONE ENCOUNTER
Spoke with Ms. Oreilly's daughter she stated she was sure if her mom could make this appointment she will give us a call back if she needs to reschedule.

## 2023-07-21 ENCOUNTER — TELEPHONE (OUTPATIENT)
Dept: PALLIATIVE MEDICINE | Facility: CLINIC | Age: 88
End: 2023-07-21
Payer: MEDICARE

## 2023-07-23 ENCOUNTER — PATIENT MESSAGE (OUTPATIENT)
Dept: RADIATION ONCOLOGY | Facility: CLINIC | Age: 88
End: 2023-07-23
Payer: MEDICARE

## 2023-07-24 ENCOUNTER — TELEPHONE (OUTPATIENT)
Dept: SURGERY | Facility: CLINIC | Age: 88
End: 2023-07-24
Payer: MEDICARE

## 2023-07-24 NOTE — TELEPHONE ENCOUNTER
"----- Message from Jessica Tamayo sent at 7/24/2023  8:50 AM CDT -----  Regarding: Reschedule Existing Appointment        Appt Date:   07/26    Type of appt:   Ep    Physician:   Dr. Ortez    Reason for rescheduling?   They want to come in today if possible.     Caller:    Tyesha (Pt's Daughter)    Contact Preference:  [phone]      "Thank you for all that you do for our patients"     "

## 2023-07-25 ENCOUNTER — TELEPHONE (OUTPATIENT)
Dept: PALLIATIVE MEDICINE | Facility: CLINIC | Age: 88
End: 2023-07-25
Payer: MEDICARE

## 2023-07-26 ENCOUNTER — TELEPHONE (OUTPATIENT)
Dept: HEMATOLOGY/ONCOLOGY | Facility: CLINIC | Age: 88
End: 2023-07-26
Payer: MEDICARE

## 2023-07-26 ENCOUNTER — OFFICE VISIT (OUTPATIENT)
Dept: PALLIATIVE MEDICINE | Facility: CLINIC | Age: 88
End: 2023-07-26
Payer: MEDICARE

## 2023-07-26 DIAGNOSIS — F43.20 ADJUSTMENT DISORDER, UNSPECIFIED TYPE: ICD-10-CM

## 2023-07-26 DIAGNOSIS — F41.9 ANXIETY: ICD-10-CM

## 2023-07-26 DIAGNOSIS — Z71.89 ACP (ADVANCE CARE PLANNING): Primary | ICD-10-CM

## 2023-07-26 DIAGNOSIS — Z51.5 ENCOUNTER FOR PALLIATIVE CARE: ICD-10-CM

## 2023-07-26 DIAGNOSIS — R63.0 ANOREXIA: ICD-10-CM

## 2023-07-26 DIAGNOSIS — R53.83 FATIGUE, UNSPECIFIED TYPE: ICD-10-CM

## 2023-07-26 DIAGNOSIS — R11.0 NAUSEA: ICD-10-CM

## 2023-07-26 DIAGNOSIS — R19.7 DIARRHEA, UNSPECIFIED TYPE: ICD-10-CM

## 2023-07-26 DIAGNOSIS — G89.3 CANCER RELATED PAIN: ICD-10-CM

## 2023-07-26 DIAGNOSIS — G93.89 BRAIN MASS: ICD-10-CM

## 2023-07-26 DIAGNOSIS — R53.81 DEBILITY: ICD-10-CM

## 2023-07-26 PROCEDURE — 99215 OFFICE O/P EST HI 40 MIN: CPT | Mod: 95,,, | Performed by: NURSE PRACTITIONER

## 2023-07-26 PROCEDURE — 99215 PR OFFICE/OUTPT VISIT, EST, LEVL V, 40-54 MIN: ICD-10-PCS | Mod: 95,,, | Performed by: NURSE PRACTITIONER

## 2023-07-26 RX ORDER — PROMETHAZINE HYDROCHLORIDE 12.5 MG/1
12.5 TABLET ORAL EVERY 6 HOURS PRN
Qty: 45 TABLET | Refills: 0 | Status: SHIPPED | OUTPATIENT
Start: 2023-07-26 | End: 2023-08-10

## 2023-07-26 NOTE — PROGRESS NOTES
"[facility]  Palliative Care   Social Work Visit      Patient Name: Candy Oreilly  MRN: [id number]  Palliative Care Provider:  Gi Katz DNP  Primary Care Physician: WEI CHI MD  Principal Problem: Malignant neoplasm of upper-inner quadrant of left breast in female, estrogen receptor positive/brain mass    Advance Care Planning     Date: 07/26/2023    [facility]  Team engaged the daughter/Tyesha    in a voluntary conversation about advance care planning and we specifically addressed what the goals of care would be moving forward, in light of the patient's change in clinical status, specifically increased vomiting, diarrhea and lethargy secondary to breast cancer.   We reviewed recommendations of oncology and radiation oncology, which is no further therapy due to patient's inability to tolerate or gain further benefit.  We explored the patient's values and preferences for future care.  The family endorses that what is most important right now is to focus on "staying comfortable".     Accordingly, we have decided that the best plan to meet the patient's goals includes  alleviating symptoms in outpatient palliative setting.     Team did explain the role for hospice care at this stage of the patient's illness, including its ability to help the patient live with the best quality of life possible. Daughter reports this level of care has been discussed however feels electing hospice would mean patient and family have "given up". SW reframed family's perception highlighting the treatment of patient would shift from curative to controlling of symptoms. We will not be making a hospice referral.     Team spent a total of 20  minutes engaging the patient in this advance care planning discussion.    Plan of care:   Daughter encouraged to reach out to clinic if family decided to pursue hospice treatment prior to next scheduled palliative medicine appointment.  Daughter also instructed " to bring patient to ED if physical symptoms worsen despite recommendations of pal med team outside of clinic hours.    SW remains available to provide support. SW will continue to follow.    Brook Barajas, SOPHIEW  Outpatient   Palliative Medicine

## 2023-07-26 NOTE — TELEPHONE ENCOUNTER
"----- Message from Kate Osei sent at 7/26/2023 10:28 AM CDT -----  Regarding: Pt advice  Contact: Pt     Pt's daughter calling to inform provide pt wont be able to make it to appt. Pt is weak, and vomiting. Pt's daughter scheduled pt a virtual appt with Dr. Katz     Confirmed contact below:   Contact Name: mariaelena zhong (Daughter)   [phone] (Mobile)                     Additional Notes:  "Thank you for all that you do for our patients"                                           "

## 2023-07-26 NOTE — PROGRESS NOTES
The patient location is: home/[place]    The chief complaint leading to consultation is: symptom mgt/ACP    Visit type: audiovisual    Face to Face time with patient: 48    70 minutes of total time spent on the encounter, which includes face to face time and non-face to face time preparing to see the patient (eg, review of tests), Obtaining and/or reviewing separately obtained history, Documenting clinical information in the electronic or other health record, Independently interpreting results (not separately reported) and communicating results to the patient/family/caregiver, or Care coordination (not separately reported).       Each patient to whom he or she provides medical services by telemedicine is:  (1) informed of the relationship between the physician and patient and the respective role of any other health care provider with respect to management of the patient; and (2) notified that he or she may decline to receive medical services by telemedicine and may withdraw from such care at any time.    Notes:     Consult Note  Palliative Care      Consult Requested By: No ref. provider found  Reason for Consult: symptom management/ACP      ASSESSMENT/PLAN:     Plan/Recommendations:    07/26/2023:  - [facility] discussion, pall care SW joined visit today    Malignant neoplasm of upper-inner quadrant of left breast in female, estrogen receptor positive/brain mass  - following with Dr. Ortez  - did not tolerate anastrozole   - currently on exemestane      Encounter for palliative care  - Patient is decisional  - Patient accompanied today by daughter Tyesha  - ACP documents are not uploaded into EMR   - Philosophy of Palliative Medicine reviewed with patient and family at first visit  - New patient folder given to and reviewed with patient and family at first visit  - Goals of care: To preserve/regain independence, quality of life. Struggling with many challenging symptoms including vertigo making it difficult to ambulate,  "high emotionality, tremors and pain.      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  - previously: patient extremely tearful throughout appointment, says she cries easily "about nothing", says prior to cancer medication she was a very happy person, she does say feels relief after crying episodes   - grieving loss of independence, 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, difficult transition to dependence on daughter and others for her care  - Support system: Lives with daughter Tyesha, has a son who lives in [facility]   - discussed at length the potential benefit of talking through complex feelings and grief with a counselor, she was resistant to this idea but agreed to consider it   - reports high anxiety associated with the process of selling her home, as well as the number of providers she is seeing, she says she believed pall care was going to take over all of her care, she is tearful when we explain our role is limited to symptom management, support and goal discussion  - currently on celexa 20 mg daily   - currently on xanex 0.5 mg BID daily PRN, encouraged to use as need  - emotional support provided  - [facility] SW joined visit, pls see separate note for full assessment   - will continue to monitor     Insomnia/fatigue   - severe fatigue, feels tired all of the time, worsening, sleeping "all the time"  - difficulty sleeping at night, though uses xanax at times, which helps  - tip " sheet provided in new patient folder  - will continue to monitor     Nausea/vomiting/diarrhea/anorexia  - today (7/25) endorses all of the above  - diarrhea  - recommend significantly increased hydration   - recommend imodium   - discussed close monitoring for s/s indicating severe dehydration and/or to prompt ED presentation  - refilled phenergan 12.5 mg, per pt's daughter, this provides good relief   - will continue to monitor     Understanding of illness/Prognosis: good    Goals of care: quality of life, regaining independence, functionality     Follow up: 4 weeks    Patient's encounter and above plan of care discussed with patient's oncology team.     SUBJECTIVE:     History of Present Illness:  Patient is a 88 y.o. year old female presenting with malignant neoplasm of upper-inner quadrant of left breast in female, estrogen receptor positive and brain mass. Please see oncology notes for full oncologic history and treatment course.     07/26/2023:    Patient presents via virtual visit with her daughter. This visit was originally scheduled as in-person, but patient is too ill to leave her home today. She is laying on couch for entire visit, intermittently sleeping, daughter does most of the talking. Patient has been feeling increasingly unwell including nausea, vomiting, diarrhea and poor appetite. She is increasingly weak and sleeping a lot. We discuss that they have been told there are no more reasonable treatment options for her cancer and that supportive care has been recommended by oncology team. We discuss that while Ms. Gupta's symptoms are rapidly worsening, it becomes increasingly difficult to manage them on an outpt basis. We discuss the benefits of hospice, especially aggressive symptom management, home-based care, 24 hours support and added support for caregivers. Pt's daughter is tearful throughout discussion. She decides that though she understands there will be no further treatment for her mother's  "cancer, transitioning to hospice would "feel like giving up", which they are not yet ready to do. Will f/u with pt in 4 weeks, strongly encouraged to contact clinic should interim needs arise or should they decide they are ready to transition to hospice.      5/31/2023:  ERLINDA THACKER reviewed: As expected    Patient presents to clinic with her daughter Tyesha. She cycles between calm and conversant and sudden bursts of tearful emotion. Her symptom burden is high. She states that she was a happy person prior to starting ca regimen and now cries very frequently without clear provocation. She has breast pain and difficulty walking due to issues with balance. She is fatigued all of the time. She is also very anxious about life logistics: selling her house, frequent medical appointments and not being able to get all of her meds from one pharmacy. She anticipates upcoming scans and appointments will help define treatment decisions. RTC after next appointment with Dr. Ortez.     Past Medical History:   Diagnosis Date    Anxiety disorder, unspecified     Breast cancer     Coronary artery disease     GERD (gastroesophageal reflux disease)     Thyroid disease      Past Surgical History:   Procedure Laterality Date    APPENDECTOMY      BREAST BIOPSY      CHOLECYSTECTOMY       Family History   Problem Relation Age of Onset    Thyroid cancer Mother     Lung cancer Father     Colon cancer Father     Breast cancer Sister     Cancer Brother      Review of patient's allergies indicates:   Allergen Reactions    Pregabalin Swelling    Atorvastatin Other (See Comments)     Muscle weakness      Dexlansoprazole Other (See Comments)     Other reaction(s): HAND SPASMS  Muscle weakness      Ezetimibe Other (See Comments)     Other reaction(s): MUSCLE PAIN  Muscle pain      Gabapentin Other (See Comments)     Other reaction(s): MAKES HER DIZZY  Dizziness      Onabotulinumtoxina      Medical botox    Oxybutynin Other (See Comments)     Other " reaction(s): CANT PEE  Stopped urine flow      Rosuvastatin Other (See Comments)     Leg muscle pain      Sertraline Other (See Comments)     Shaking of hands      Meperidine Nausea Only       Medications:    Current Outpatient Medications:     albuterol-ipratropium (DUO-NEB) 2.5 mg-0.5 mg/3 mL nebulizer solution, Take 3 mLs by nebulization every 6 (six) hours as needed for Wheezing or Shortness of Breath. Rescue, Disp: 75 mL, Rfl: 0    ALPRAZolam (XANAX) 0.5 MG tablet, Take 1 tablet (0.5 mg total) by mouth 2 (two) times daily as needed for Anxiety., Disp: 30 tablet, Rfl: 0    amLODIPine (NORVASC) 10 MG tablet, Take 1 tablet (10 mg total) by mouth once daily., Disp: 30 tablet, Rfl: 11    artificial tears (ISOPTO TEARS) 0.5 % ophthalmic solution, Place 1 drop into both eyes 3 (three) times daily., Disp: , Rfl:     blood sugar diagnostic Strp, 1 each by Misc.(Non-Drug; Combo Route) route 4 (four) times daily before meals and nightly., Disp: 100 each, Rfl: 0    blood-glucose meter kit, Check glucose before meals and before bed, Disp: 1 each, Rfl: 0    carvediloL (COREG) 6.25 MG tablet, Take 1 tablet (6.25 mg total) by mouth 2 (two) times daily., Disp: 60 tablet, Rfl: 11    celecoxib (CELEBREX) 200 MG capsule, Take 1 capsule (200 mg total) by mouth once daily., Disp: 30 capsule, Rfl: 1    cetirizine (ZYRTEC) 10 MG tablet, Take 1 tablet (10 mg total) by mouth every evening., Disp: 30 tablet, Rfl: 2    citalopram (CELEXA) 20 MG tablet, Take 1 tablet (20 mg total) by mouth once daily., Disp: 30 tablet, Rfl: 6    exemestane (AROMASIN) 25 mg tablet, Take 1 tablet (25 mg total) by mouth once daily., Disp: 90 tablet, Rfl: 3    fexofenadine (ALLEGRA) 180 MG tablet, Take 1 tablet by mouth once daily., Disp: , Rfl:     fluticasone propionate (FLONASE) 50 mcg/actuation nasal spray, 1 spray by Each Nostril route once daily., Disp: , Rfl:     hydrALAZINE (APRESOLINE) 25 MG tablet, Take 1 tablet (25 mg total) by mouth every 8 (eight)  hours as needed (sbp>160)., Disp: , Rfl:     hydroCHLOROthiazide (HYDRODIURIL) 25 MG tablet, Take 1 tablet (25 mg total) by mouth once daily., Disp: 90 tablet, Rfl: 6    HYDROcodone-acetaminophen (NORCO) 7.5-325 mg per tablet, Take 1 tablet by mouth every 8 (eight) hours as needed for Pain., Disp: 30 tablet, Rfl: 0    insulin aspart U-100 (NOVOLOG) 100 unit/mL (3 mL) InPn pen, Inject 0-5 Units into the skin 4 (four) times daily before meals and nightly. Low dose sliding scale., Disp: , Rfl: 0    insulin aspart U-100 (NOVOLOG) 100 unit/mL (3 mL) InPn pen, Inject 8 Units into the skin 3 (three) times daily., Disp: , Rfl: 0    insulin detemir U-100 (LEVEMIR FLEXTOUCH) 100 unit/mL (3 mL) SubQ InPn pen, Inject 18 Units into the skin once daily., Disp: , Rfl: 0    levothyroxine (SYNTHROID) 75 MCG tablet, Take 1 tablet (75 mcg total) by mouth once daily., Disp: 90 tablet, Rfl: 3    meclizine (ANTIVERT) 25 mg tablet, Take 1 tablet (25 mg total) by mouth every 6 (six) hours as needed for Dizziness., Disp: , Rfl: 0    NASAL SPRAY, OXYMETAZOLINE, 0.05 % nasal spray, , Disp: , Rfl:     ondansetron (ZOFRAN-ODT) 4 MG TbDL, Take 1 tablet (4 mg total) by mouth every 8 (eight) hours as needed., Disp: , Rfl:     pantoprazole (PROTONIX) 40 MG tablet, Take 1 tablet (40 mg total) by mouth 2 (two) times daily., Disp: 60 tablet, Rfl: 0    polyethylene glycol (GLYCOLAX) 17 gram PwPk, Take 17 g by mouth 2 (two) times daily as needed., Disp: , Rfl: 0    simethicone (MYLICON) 80 MG chewable tablet, Take 1 tablet (80 mg total) by mouth 4 (four) times daily as needed., Disp: , Rfl: 0    SITagliptin phosphate (JANUVIA) 100 MG Tab, Take 1 tablet (100 mg total) by mouth once daily., Disp: 90 tablet, Rfl: 3    levETIRAcetam (KEPPRA) 500 MG Tab, TAKE 1 TABLET(500 MG) BY MOUTH TWICE DAILY, Disp: 180 tablet, Rfl: 2    melatonin (MELATIN) 3 mg tablet, Take 2 tablets (6 mg total) by mouth nightly as needed for Insomnia. (Patient not taking: Reported on  7/26/2023), Disp: , Rfl: 0    promethazine (PHENERGAN) 12.5 MG Tab, Take 1 tablet (12.5 mg total) by mouth every 6 (six) hours as needed (nausea)., Disp: 45 tablet, Rfl: 0    traZODone (DESYREL) 50 MG tablet, Take 0.5 tablets (25 mg total) by mouth nightly as needed for Insomnia. (Patient not taking: Reported on 7/26/2023), Disp: , Rfl:     OBJECTIVE:       ROS:  Review of Systems   Constitutional:  Positive for activity change and fatigue. Negative for appetite change.   HENT:  Negative for congestion, dental problem and drooling.    Eyes:  Positive for visual disturbance. Negative for pain, discharge and itching.   Respiratory:  Negative for cough, shortness of breath and wheezing.    Cardiovascular:  Negative for chest pain, palpitations and leg swelling.   Gastrointestinal:  Positive for diarrhea, nausea and vomiting. Negative for abdominal pain.   Endocrine: Negative for cold intolerance, heat intolerance and polydipsia.   Genitourinary:  Negative for difficulty urinating, dyspareunia and dysuria.        Left breast pain     Musculoskeletal:  Positive for arthralgias and gait problem.   Skin:  Negative for color change, pallor and rash.   Neurological:  Positive for dizziness, tremors and weakness.   Psychiatric/Behavioral:  Positive for dysphoric mood and sleep disturbance. The patient is nervous/anxious.      Review of Symptoms      Symptom Assessment (ESAS 0-10 Scale)  Pain:  6  Dyspnea:  0  Anxiety:  6  Nausea:  10  Depression:  7  Anorexia:  9  Fatigue:  9  Insomnia:  0  Restlessness:  0  Agitation:  0     CAM / Delirium:  Negative  Constipation:  Negative  Diarrhea:  Positive    Anxiety:  Is nervous/anxious    Bowel Management Plan (BMP):  Yes      Pain Assessment:    Location(s): other (breast)      Modified Татьяна Scale:  0    ECOG Performance Status thGthrthathdtheth:th th4th Living Arrangements:  Lives with family    Psychosocial/Cultural:   See Palliative Psychosocial Note: Yes  **Primary  to  Follow**  Palliative Care  Consult: No  Other       Location (Other): breast       Location: left       Quality: none        Quantity: 8/10 in intensity        Chronicity: Onset 3 month(s) ago, stable        Aggravating Factors: none        Alleviating Factors: opiates        Associated Symptoms: none  Advance Care Planning   Advance Directives:   Living Will: No        Oral Declaration: No    LaPOST: No    Do Not Resuscitate Status: No    Medical Power of : No        Oral Declaration: No      Decision Making:  Patient answered questions  Goals of Care: What is most important right now is to focus on improvement in condition but with limits to invasive therapies. Accordingly, we have decided that the best plan to meet the patient's goals includes continuing with treatment.        Physical Exam:  No vitals obtained, virtual visit     Physical Exam  Vitals reviewed.   Constitutional:       General: She is in acute distress.      Appearance: Normal appearance. She is normal weight. She is ill-appearing and toxic-appearing. She is not diaphoretic.   HENT:      Head: Normocephalic and atraumatic.      Right Ear: External ear normal.      Left Ear: External ear normal.      Nose: Nose normal.   Eyes:      Extraocular Movements: Extraocular movements intact.   Cardiovascular:      Rate and Rhythm: Normal rate.   Pulmonary:      Effort: Pulmonary effort is normal. No respiratory distress.      Breath sounds: No stridor.   Musculoskeletal:         General: No deformity or signs of injury. Normal range of motion.      Cervical back: Normal range of motion.      Comments: Laying down through visit   Skin:     General: Skin is warm and dry.      Coloration: Skin is not jaundiced.      Findings: No bruising or lesion.   Neurological:      Mental Status: She is oriented to person, place, and time. She is lethargic.      Motor: Weakness present.   Psychiatric:         Attention and Perception: Attention and  "perception normal.         Mood and Affect: Affect is tearful.         Speech: Speech normal.         Behavior: Behavior is cooperative.         Thought Content: Thought content normal.       Labs:  CBC:   WBC   Date Value Ref Range Status   03/21/2023 10.10 3.90 - 12.70 K/uL Final     Hemoglobin   Date Value Ref Range Status   03/21/2023 14.6 12.0 - 16.0 g/dL Final     Hematocrit   Date Value Ref Range Status   03/21/2023 46.0 37.0 - 48.5 % Final     MCV   Date Value Ref Range Status   03/21/2023 87 82 - 98 fL Final     Platelets   Date Value Ref Range Status   03/21/2023 226 150 - 450 K/uL Final       LFT:   Lab Results   Component Value Date    AST 17 03/21/2023    ALKPHOS 76 03/21/2023    BILITOT 1.0 03/21/2023       Albumin:   Albumin   Date Value Ref Range Status   03/21/2023 3.7 3.5 - 5.2 g/dL Final     Protein:   Total Protein   Date Value Ref Range Status   03/21/2023 6.4 6.0 - 8.4 g/dL Final       Radiology:I have reviewed all pertinent imaging results/findings within the past 24 hours.    03/03/2023 US L breast: "Impression: Left Mass: Left breast mass at the upper inner middle 11 o'clock position, stable. Assessment: 6 - Known biopsy, proven malignancy. "    07/12/2023 MRI brain: "Impression:     1. 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  Metastatic disease presumed in light of the history although other lesions including lymphoma could have a similar appearance.  2. Adjacent enhancing lesions in the left temporal lobe at the site of reported treatment appear markedly smaller."    03/01/2023 CT CAP: "Impression:     Interval decrease in size of left breast soft tissue collection.     Diverticulosis coli.     Subcentimeter splenic hypodensities too small to characterize.     Interval development of a mild right-sided pleural fluid collection."    03/21/2023 MRI brain "Interval increased sized now more rounded nodular enhancing lesion in medial left temporal lobe " "concerning for progression of metastatic disease in light of history     Punctate new question enhancing focus versus artifact in the left inferior frontal lobe.  Cannot exclude separate region of metastatic disease."       I spent a total of 70 minutes on the day of the visit.This includes face to face time in discussion of goals of care, symptom assessment, coordination of care and emotional support.  This also includes non-face to face time preparing to see the patient (eg, review of tests/imaging), obtaining and/or reviewing separately obtained history, documenting clinical information in the electronic or other health record, independently interpreting results and communicating results to the patient/family/caregiver, or care coordinator.     A total of 35 min was spent on advance care planning, goals of care discussion, emotional support, formulating and communicating prognosis and goals of care, exploring burden/benefit of various approaches of treatment. This discussion occurred on a fully voluntary basis with the verbal consent of the patient and/or family    Signature: Gi Katz DNP    "

## 2023-07-26 NOTE — Clinical Note
"Hello, Conducted another lengthy C discussion. Patient is doing very poorly and could not physically come to clinic visit. She was laying in bed through entire virtual visit and contributed minimally. Discussed with her daughter recommendation for comfort-focused care/hospice at length, she was very tearful but determined she is "not ready to give up". Will continue to discuss at f/u visit. I'm not sure pt will make f/u, at minimum would imagine transition will occur sooner.   Best, Gi"

## 2023-08-08 ENCOUNTER — TELEPHONE (OUTPATIENT)
Dept: PALLIATIVE MEDICINE | Facility: CLINIC | Age: 88
End: 2023-08-08
Payer: MEDICARE

## 2023-08-08 DIAGNOSIS — Z51.5 ENCOUNTER FOR PALLIATIVE CARE: Primary | ICD-10-CM

## 2023-08-08 DIAGNOSIS — C50.212 MALIGNANT NEOPLASM OF UPPER-INNER QUADRANT OF LEFT BREAST IN FEMALE, ESTROGEN RECEPTOR POSITIVE: ICD-10-CM

## 2023-08-08 DIAGNOSIS — Z17.0 MALIGNANT NEOPLASM OF UPPER-INNER QUADRANT OF LEFT BREAST IN FEMALE, ESTROGEN RECEPTOR POSITIVE: ICD-10-CM

## 2023-08-08 NOTE — TELEPHONE ENCOUNTER
"SW returned call to daughter/Tyesha. Daughter reports patient is becoming weaker and her appetite is decreasing. Daughter requested assistance with someone to "show her the ways" to best make patient comfortable and safe.  SW explored daughter's understanding of hospice care. Daughter affirms this is the type of care best suited for patient. Daughter did not specify a hospice preference. NP placed referral; referral sent to [facility]. Per Kristi, agency to meet with patient and family on 8/9 at 1pm.  SW encouraged daughter to reach out to clinic with further questions/concerns. SW to follow up as needed.    Brook Barajas, [facility]  Outpatient   Palliative Medicine    "

## 2023-08-08 NOTE — TELEPHONE ENCOUNTER
Patient's daughter called stating she spoke with NP Gi Stables and was told to call if she needs anything. She state she's having trouble with getting her mom of bed and would like a referral to home palliative.

## 2023-08-10 DIAGNOSIS — K21.9 GASTROESOPHAGEAL REFLUX DISEASE WITHOUT ESOPHAGITIS: ICD-10-CM

## 2023-08-10 RX ORDER — PANTOPRAZOLE SODIUM 40 MG/1
40 TABLET, DELAYED RELEASE ORAL 2 TIMES DAILY
Qty: 60 TABLET | Refills: 0 | Status: SHIPPED | OUTPATIENT
Start: 2023-08-10

## 2024-07-02 NOTE — SUBJECTIVE & OBJECTIVE
Oncology Treatment Plan:   [No matching plan found]    Medications:  Continuous Infusions:   sodium chloride 0.9% 50 mL/hr at 11/24/22 1425     Scheduled Meds:   atenoloL  50 mg Oral Daily    celecoxib  200 mg Oral Daily    cetirizine  5 mg Oral Daily    dexAMETHasone  4 mg Intravenous Q6H    levetiracetam IV  500 mg Intravenous Q12H    levothyroxine  75 mcg Oral Daily    pantoprazole  40 mg Oral BID     PRN Meds:acetaminophen, albuterol-ipratropium, ALPRAZolam, dextrose 10%, dextrose 10%, glucagon (human recombinant), glucose, glucose, influenza 65up-adj, melatonin, naloxone, ondansetron, polyethylene glycol, prochlorperazine, simethicone, sodium chloride 0.9%     Review of patient's allergies indicates:   Allergen Reactions    Pregabalin Swelling    Atorvastatin Other (See Comments)     Muscle weakness      Dexlansoprazole Other (See Comments)     Other reaction(s): HAND SPASMS  Muscle weakness      Ezetimibe Other (See Comments)     Other reaction(s): MUSCLE PAIN  Muscle pain      Gabapentin Other (See Comments)     Other reaction(s): MAKES HER DIZZY  Dizziness      Onabotulinumtoxina      Medical botox    Oxybutynin Other (See Comments)     Other reaction(s): CANT PEE  Stopped urine flow      Rosuvastatin Other (See Comments)     Leg muscle pain      Sertraline Other (See Comments)     Shaking of hands      Meperidine Nausea Only        Past Medical History:   Diagnosis Date    Anxiety disorder, unspecified     Coronary artery disease     GERD (gastroesophageal reflux disease)     Thyroid disease      Past Surgical History:   Procedure Laterality Date    APPENDECTOMY      CHOLECYSTECTOMY       Family History       Problem Relation (Age of Onset)    Breast cancer Sister    Cancer Brother    Colon cancer Father    Lung cancer Father    Thyroid cancer Mother          Tobacco Use    Smoking status: Never    Smokeless tobacco: Never   Substance and Sexual Activity    Alcohol use: Not on file    Drug use: Not on file     Sexual activity: Not on file       Review of Systems   Constitutional:  Positive for activity change. Negative for appetite change, chills and fever.   HENT:  Negative for congestion, hearing loss and rhinorrhea.    Eyes:  Negative for discharge, itching and visual disturbance.   Respiratory:  Negative for apnea, cough and shortness of breath.    Cardiovascular:  Negative for chest pain, palpitations and leg swelling.   Gastrointestinal:  Positive for nausea. Negative for abdominal distention, abdominal pain, constipation and vomiting.   Endocrine: Negative for cold intolerance and heat intolerance.   Genitourinary:  Negative for dysuria and hematuria.   Musculoskeletal:  Negative for back pain, neck pain and neck stiffness.   Skin:  Negative for rash and wound.   Neurological:  Positive for speech difficulty and headaches. Negative for dizziness and seizures.   Psychiatric/Behavioral:  Negative for agitation, confusion and suicidal ideas.    Objective:     Vital Signs (Most Recent):  Temp: 98.4 °F (36.9 °C) (11/24/22 1542)  Pulse: 73 (11/24/22 1542)  Resp: 16 (11/24/22 1542)  BP: (!) 164/77 (11/24/22 1542)  SpO2: 98 % (11/24/22 1542)   Vital Signs (24h Range):  Temp:  [97.6 °F (36.4 °C)-98.4 °F (36.9 °C)] 98.4 °F (36.9 °C)  Pulse:  [61-84] 73  Resp:  [16-20] 16  SpO2:  [94 %-98 %] 98 %  BP: (134-164)/(61-81) 164/77     Weight: 86.2 kg (189 lb 15.9 oz)  Body mass index is 33.66 kg/m².  Body surface area is 1.96 meters squared.      Intake/Output Summary (Last 24 hours) at 11/24/2022 1819  Last data filed at 11/24/2022 1200  Gross per 24 hour   Intake 480 ml   Output --   Net 480 ml       Physical Exam  Vitals reviewed.   Constitutional:       General: She is not in acute distress.     Appearance: She is well-developed.   HENT:      Head: Normocephalic and atraumatic.      Nose: Nose normal. No rhinorrhea.   Eyes:      General: No scleral icterus.        Right eye: No discharge.         Left eye: No discharge.       Extraocular Movements: Extraocular movements intact.   Neck:      Vascular: No JVD.   Cardiovascular:      Rate and Rhythm: Normal rate and regular rhythm.   Pulmonary:      Effort: Pulmonary effort is normal. No respiratory distress.      Breath sounds: No wheezing.   Abdominal:      General: There is no distension.      Palpations: Abdomen is soft.      Tenderness: There is no abdominal tenderness.   Musculoskeletal:         General: No deformity. Normal range of motion.      Cervical back: Normal range of motion and neck supple.   Skin:     General: Skin is warm and dry.   Neurological:      General: No focal deficit present.      Mental Status: She is alert and oriented to person, place, and time.      Comments: 5/5 strength BUE and BLE   Psychiatric:         Mood and Affect: Mood normal.         Behavior: Behavior normal.       Significant Labs:   CBC:   Recent Labs   Lab 11/23/22 1810 11/24/22  0346   WBC 11.60 11.11   HGB 15.9 14.8   HCT 47.1 44.5    264    and CMP:   Recent Labs   Lab 11/23/22 1810 11/24/22  0346   * 133*   K 3.8 3.6   CL 96 98   CO2 25 24   * 274*   BUN 20 16   CREATININE 1.2 0.8   CALCIUM 9.7 9.3   PROT 6.9 6.3   ALBUMIN 3.9 3.6   BILITOT 1.0 1.2*   ALKPHOS 85 81   AST 20 16   ALT 22 17   ANIONGAP 13 11       Diagnostic Results:  MRI brain (11/23/2022):  Enhancing mass in the medial temporal lobe possibly interventricular.  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     Minimal surrounding vasogenic type edema with no mass effect.     No restricted diffusion to suggest acute infarct.   No